# Patient Record
Sex: FEMALE | Race: BLACK OR AFRICAN AMERICAN | NOT HISPANIC OR LATINO | Employment: FULL TIME | URBAN - METROPOLITAN AREA
[De-identification: names, ages, dates, MRNs, and addresses within clinical notes are randomized per-mention and may not be internally consistent; named-entity substitution may affect disease eponyms.]

---

## 2019-11-19 ENCOUNTER — LAB (OUTPATIENT)
Dept: LAB | Facility: HOSPITAL | Age: 29
End: 2019-11-19

## 2019-11-19 ENCOUNTER — INITIAL PRENATAL (OUTPATIENT)
Dept: OBSTETRICS AND GYNECOLOGY | Facility: CLINIC | Age: 29
End: 2019-11-19

## 2019-11-19 ENCOUNTER — LAB REQUISITION (OUTPATIENT)
Dept: LAB | Facility: HOSPITAL | Age: 29
End: 2019-11-19

## 2019-11-19 VITALS
DIASTOLIC BLOOD PRESSURE: 68 MMHG | HEIGHT: 69 IN | BODY MASS INDEX: 28.29 KG/M2 | SYSTOLIC BLOOD PRESSURE: 110 MMHG | WEIGHT: 191 LBS

## 2019-11-19 DIAGNOSIS — E66.9 OBESITY (BMI 30-39.9): ICD-10-CM

## 2019-11-19 DIAGNOSIS — Z34.81 PRENATAL CARE, SUBSEQUENT PREGNANCY, FIRST TRIMESTER: ICD-10-CM

## 2019-11-19 DIAGNOSIS — Z3A.09 9 WEEKS GESTATION OF PREGNANCY: ICD-10-CM

## 2019-11-19 DIAGNOSIS — Z00.00 ROUTINE GENERAL MEDICAL EXAMINATION AT A HEALTH CARE FACILITY: ICD-10-CM

## 2019-11-19 DIAGNOSIS — O30.049 TWIN PREGNANCY, DICHORIONIC/DIAMNIOTIC, UNSPECIFIED TRIMESTER: ICD-10-CM

## 2019-11-19 DIAGNOSIS — Z34.81 PRENATAL CARE, SUBSEQUENT PREGNANCY, FIRST TRIMESTER: Primary | ICD-10-CM

## 2019-11-19 LAB
ABO GROUP BLD: NORMAL
ALBUMIN SERPL-MCNC: 4.1 G/DL (ref 3.5–5.2)
ALBUMIN/GLOB SERPL: 1.2 G/DL
ALP SERPL-CCNC: 47 U/L (ref 39–117)
ALT SERPL W P-5'-P-CCNC: 29 U/L (ref 1–33)
ANION GAP SERPL CALCULATED.3IONS-SCNC: 13.2 MMOL/L (ref 5–15)
AST SERPL-CCNC: 19 U/L (ref 1–32)
BASOPHILS # BLD AUTO: 0.02 10*3/MM3 (ref 0–0.2)
BASOPHILS NFR BLD AUTO: 0.2 % (ref 0–1.5)
BILIRUB SERPL-MCNC: 0.3 MG/DL (ref 0.2–1.2)
BLD GP AB SCN SERPL QL: NEGATIVE
BUN BLD-MCNC: 10 MG/DL (ref 6–20)
BUN/CREAT SERPL: 20.4 (ref 7–25)
CALCIUM SPEC-SCNC: 9.6 MG/DL (ref 8.6–10.5)
CHLORIDE SERPL-SCNC: 98 MMOL/L (ref 98–107)
CO2 SERPL-SCNC: 25.8 MMOL/L (ref 22–29)
CREAT BLD-MCNC: 0.49 MG/DL (ref 0.57–1)
DEPRECATED RDW RBC AUTO: 39.4 FL (ref 37–54)
EOSINOPHIL # BLD AUTO: 0.03 10*3/MM3 (ref 0–0.4)
EOSINOPHIL NFR BLD AUTO: 0.3 % (ref 0.3–6.2)
ERYTHROCYTE [DISTWIDTH] IN BLOOD BY AUTOMATED COUNT: 12.2 % (ref 12.3–15.4)
GFR SERPL CREATININE-BSD FRML MDRD: >150 ML/MIN/1.73
GLOBULIN UR ELPH-MCNC: 3.5 GM/DL
GLUCOSE BLD-MCNC: 102 MG/DL (ref 65–99)
HBA1C MFR BLD: 6.51 % (ref 4.8–5.6)
HBV SURFACE AG SERPL QL IA: NORMAL
HCT VFR BLD AUTO: 40.9 % (ref 34–46.6)
HCV AB SER DONR QL: NORMAL
HGB BLD-MCNC: 13.2 G/DL (ref 12–15.9)
HIV1+2 AB SER QL: NORMAL
IMM GRANULOCYTES # BLD AUTO: 0.04 10*3/MM3 (ref 0–0.05)
IMM GRANULOCYTES NFR BLD AUTO: 0.5 % (ref 0–0.5)
LYMPHOCYTES # BLD AUTO: 2.03 10*3/MM3 (ref 0.7–3.1)
LYMPHOCYTES NFR BLD AUTO: 23.1 % (ref 19.6–45.3)
MCH RBC QN AUTO: 28.4 PG (ref 26.6–33)
MCHC RBC AUTO-ENTMCNC: 32.3 G/DL (ref 31.5–35.7)
MCV RBC AUTO: 88.1 FL (ref 79–97)
MONOCYTES # BLD AUTO: 0.56 10*3/MM3 (ref 0.1–0.9)
MONOCYTES NFR BLD AUTO: 6.4 % (ref 5–12)
NEUTROPHILS # BLD AUTO: 6.12 10*3/MM3 (ref 1.7–7)
NEUTROPHILS NFR BLD AUTO: 69.5 % (ref 42.7–76)
NRBC BLD AUTO-RTO: 0 /100 WBC (ref 0–0.2)
PLATELET # BLD AUTO: 329 10*3/MM3 (ref 140–450)
PMV BLD AUTO: 10.3 FL (ref 6–12)
POTASSIUM BLD-SCNC: 3.3 MMOL/L (ref 3.5–5.2)
PROT SERPL-MCNC: 7.6 G/DL (ref 6–8.5)
RBC # BLD AUTO: 4.64 10*6/MM3 (ref 3.77–5.28)
RH BLD: POSITIVE
RPR SER QL: NORMAL
RUBV IGG SERPL IA-ACNC: POSITIVE
SODIUM BLD-SCNC: 137 MMOL/L (ref 136–145)
TSH SERPL DL<=0.05 MIU/L-ACNC: 0.62 UIU/ML (ref 0.27–4.2)
WBC NRBC COR # BLD: 8.8 10*3/MM3 (ref 3.4–10.8)

## 2019-11-19 PROCEDURE — 84443 ASSAY THYROID STIM HORMONE: CPT | Performed by: OBSTETRICS & GYNECOLOGY

## 2019-11-19 PROCEDURE — 99204 OFFICE O/P NEW MOD 45 MIN: CPT | Performed by: OBSTETRICS & GYNECOLOGY

## 2019-11-19 PROCEDURE — 87086 URINE CULTURE/COLONY COUNT: CPT | Performed by: OBSTETRICS & GYNECOLOGY

## 2019-11-19 PROCEDURE — 86803 HEPATITIS C AB TEST: CPT

## 2019-11-19 PROCEDURE — 81001 URINALYSIS AUTO W/SCOPE: CPT | Performed by: OBSTETRICS & GYNECOLOGY

## 2019-11-19 PROCEDURE — 36415 COLL VENOUS BLD VENIPUNCTURE: CPT | Performed by: OBSTETRICS & GYNECOLOGY

## 2019-11-19 PROCEDURE — 83036 HEMOGLOBIN GLYCOSYLATED A1C: CPT

## 2019-11-19 PROCEDURE — 80053 COMPREHEN METABOLIC PANEL: CPT | Performed by: OBSTETRICS & GYNECOLOGY

## 2019-11-19 PROCEDURE — 80081 OBSTETRIC PANEL INC HIV TSTG: CPT

## 2019-11-19 PROCEDURE — 36415 COLL VENOUS BLD VENIPUNCTURE: CPT

## 2019-11-19 RX ORDER — ONDANSETRON 4 MG/1
4 TABLET, FILM COATED ORAL DAILY PRN
Qty: 30 TABLET | Refills: 1 | Status: SHIPPED | OUTPATIENT
Start: 2019-11-19 | End: 2020-04-07 | Stop reason: SDDI

## 2019-11-19 RX ORDER — PROMETHAZINE HYDROCHLORIDE 25 MG/1
25 SUPPOSITORY RECTAL EVERY 6 HOURS PRN
Qty: 12 SUPPOSITORY | Refills: 1 | Status: SHIPPED | OUTPATIENT
Start: 2019-11-19 | End: 2020-06-10 | Stop reason: HOSPADM

## 2019-11-19 RX ORDER — AMOXICILLIN 500 MG/1
CAPSULE ORAL
COMMUNITY
Start: 2019-10-24 | End: 2020-01-07

## 2019-11-19 RX ORDER — PROMETHAZINE HYDROCHLORIDE 25 MG/1
25 TABLET ORAL EVERY 6 HOURS PRN
Qty: 30 TABLET | Refills: 0 | Status: SHIPPED | OUTPATIENT
Start: 2019-11-19 | End: 2020-06-10 | Stop reason: HOSPADM

## 2019-11-19 NOTE — PROGRESS NOTES
Subjective     Chief Complaint   Patient presents with   • Initial Prenatal Visit     JACINTA poole delivered in  with hyperemesis.  Patient states unable to eat or drink x 4 weeks.  She was seen in ED in [facility] 2 weeks ago given rx for UTI.  Patient rx given for zofran and insurance would not cover it.  She also states she can't take antibiotcs due to nause and vomiting       Comfort HAWK is a 29 y.o. year old .  Patient's last menstrual period was 09/15/2019..  She presents to be seen to initiate prenatal care with our practice.    She is currently having problems with nausea and vomiting  As it relates to the pregnancy, she has concerns regarding management of twin pregnancy. Discussed increased risk of prenatal complications, miscarriage, chromosomal abnormalities, Birth defects, growth abnormalities, PTL, and delivery complications.    The following portions of the patient's history were reviewed and updated as appropriate:vital signs, allergies, current medications, past medical history, past social history, past surgical history and problem list.    A 14 point review of systems was negative except for: Nausea    Objective     Physical Exam    General:  well developed; well nourished  no acute distress   Constitutional: healthy   Skin:  No suspicious lesions seen   Thyroid: normal to inspection and palpation   Lungs:  breathing is unlabored  clear to auscultation bilaterally   Heart:  regular rate and rhythm, S1, S2 normal, no murmur, click, rub or gallop   Breasts:  Not performed.   Abdomen: soft, non-tender; no masses  no umbilical or inginual hernias are present  no hepato-splenomegaly   Pelvis: Clinical staff was present for exam  External genitalia:  normal appearance of the external genitalia including Bartholin's and Gilman's glands.  :  urethral meatus normal; urethral hypermobility is absent.  Vaginal:  normal pink mucosa without prolapse or lesions. discharge present -  white  and curd-like;  Cervix:  normal appearance  Uterus:  symmetrically enlarged, consisent with 12 weeks size;  Adnexa:  normal bimanual exam of the adnexa.   Musculoskeletal: negative   Neuro: normal without focal findings, mental status, speech normal, alert and oriented x3 and SOFIA   Psych: oriented to time, place and person, mood and affect are within normal limits, pt is a good historian; no memory problems were noted       Lab Review   No data reviewed    Imaging   Pelvic ultrasound report      Assessment/Plan  High Risk Pregnancy    1Tonia Moyer was seen today for initial prenatal visit.    Diagnoses and all orders for this visit:    Prenatal care, subsequent pregnancy, first trimester  -     Liquid-based Pap Smear, Screening  -     Urinalysis With Culture If Indicated -  -     Gardnerella vaginalis, Trichomonas vaginalis, Candida albicans, DNA - Swab, Vagina  -     HIV-1 / O / 2 Ag / Antibody 4th Generation; Future  -     Obstetric Panel; Future  -     Pain Management Profile (13 Drugs) Urine - Urine, Clean Catch; Future  -     Hemoglobin A1c; Future  -     TSH  -     Hepatitis C Antibody    9 weeks gestation of pregnancy  -     Comprehensive Metabolic Panel  -     Non-invasive Prenatal Testing (NIPT)    Twin pregnancy, dichorionic/diamniotic, unspecified trimester  -     Comprehensive Metabolic Panel  -     Non-invasive Prenatal Testing (NIPT)    Obesity (BMI 30-39.9)    Other orders  -     promethazine (PHENERGAN) 25 MG tablet; Take 1 tablet by mouth Every 6 (Six) Hours As Needed for Nausea or Vomiting.  -     promethazine (PHENERGAN) 25 MG suppository; Insert 1 suppository into the rectum Every 6 (Six) Hours As Needed for Nausea.  -     ondansetron (ZOFRAN) 4 MG tablet; Take 1 tablet by mouth Daily As Needed for Nausea or Vomiting.        2. Information reviewed: smoking in pregnancy, exercise in pregnancy, nutrition in pregnancy, weight gain in pregnancy, work and travel restrictions during pregnancy, list  of OTC medications acceptable in pregnancy and call coverage groups   3. Genetic testing reviewed: NIPT (Trixie)   4. The problem list for pregnancy was initiated today   5. Will treat nausea more aggressively as OP may require admission  Re-check CCUA may need parental treatment     Follow up: 1 week(s)         This note was electronically signed.    Mahesh Ramon MD  November 19, 2019

## 2019-11-20 LAB
BACTERIA UR QL AUTO: ABNORMAL /HPF
BILIRUB UR QL STRIP: NEGATIVE
CLARITY UR: ABNORMAL
COLOR UR: ABNORMAL
GLUCOSE UR STRIP-MCNC: ABNORMAL MG/DL
HGB UR QL STRIP.AUTO: NEGATIVE
HYALINE CASTS UR QL AUTO: ABNORMAL /LPF
KETONES UR QL STRIP: ABNORMAL
LEUKOCYTE ESTERASE UR QL STRIP.AUTO: NEGATIVE
NITRITE UR QL STRIP: NEGATIVE
PH UR STRIP.AUTO: 6 [PH] (ref 5–8)
PROT UR QL STRIP: ABNORMAL
RBC # UR: ABNORMAL /HPF
REF LAB TEST METHOD: ABNORMAL
SP GR UR STRIP: >=1.03 (ref 1–1.03)
SQUAMOUS #/AREA URNS HPF: ABNORMAL /HPF
UROBILINOGEN UR QL STRIP: ABNORMAL
WBC UR QL AUTO: ABNORMAL /HPF

## 2019-11-21 LAB — BACTERIA SPEC AEROBE CULT: NO GROWTH

## 2019-11-25 ENCOUNTER — TELEPHONE (OUTPATIENT)
Dept: OBSTETRICS AND GYNECOLOGY | Facility: CLINIC | Age: 29
End: 2019-11-25

## 2019-11-25 RX ORDER — METRONIDAZOLE 7.5 MG/G
GEL VAGINAL 2 TIMES DAILY
Qty: 70 G | Refills: 0 | Status: SHIPPED | OUTPATIENT
Start: 2019-11-25 | End: 2019-11-30

## 2019-11-25 NOTE — TELEPHONE ENCOUNTER
----- Message from Mahesh Ramon MD sent at 11/25/2019  2:19 PM EST -----      ----- Message -----  From: Ramila Mckeon, [facility] Rep  Sent: 11/25/2019  10:22 AM  To: Mahesh Ramon MD

## 2019-11-25 NOTE — PROGRESS NOTES
Please advise patient that vaginal culture was positive for BV and that an appropriate antibiotic has been called in

## 2019-12-02 ENCOUNTER — TELEPHONE (OUTPATIENT)
Dept: OBSTETRICS AND GYNECOLOGY | Facility: CLINIC | Age: 29
End: 2019-12-02

## 2019-12-02 NOTE — TELEPHONE ENCOUNTER
----- Message from Mahesh Ramon MD sent at 12/2/2019  9:26 AM EST -----      ----- Message -----  From: Gemini Treviño  Sent: 12/2/2019   8:08 AM  To: Mahesh Ramon MD

## 2019-12-17 ENCOUNTER — ROUTINE PRENATAL (OUTPATIENT)
Dept: OBSTETRICS AND GYNECOLOGY | Facility: CLINIC | Age: 29
End: 2019-12-17

## 2019-12-17 VITALS — SYSTOLIC BLOOD PRESSURE: 110 MMHG | WEIGHT: 198 LBS | DIASTOLIC BLOOD PRESSURE: 72 MMHG | BODY MASS INDEX: 29.24 KG/M2

## 2019-12-17 DIAGNOSIS — Z3A.13 13 WEEKS GESTATION OF PREGNANCY: Primary | ICD-10-CM

## 2019-12-17 DIAGNOSIS — O30.049 TWIN PREGNANCY, DICHORIONIC/DIAMNIOTIC, UNSPECIFIED TRIMESTER: ICD-10-CM

## 2019-12-17 LAB
GLUCOSE UR STRIP-MCNC: NEGATIVE MG/DL
PROT UR STRIP-MCNC: ABNORMAL MG/DL

## 2019-12-17 PROCEDURE — 99213 OFFICE O/P EST LOW 20 MIN: CPT | Performed by: OBSTETRICS & GYNECOLOGY

## 2019-12-17 NOTE — PROGRESS NOTES
Chief Complaint   Patient presents with   • Routine Prenatal Visit     ob visit with cramping no bleeding having constipation       HPI: Comfort is a  currently at 13w2d who today reports the following:Headache - No ; Visual change - No ; Swelling in legs - No ; Nausea - No ; Constipation - No; Diarrhea - No ; Contractions - No ; Leaking fluid - No ; Vaginal bleeding -  No.      ROS: Pertinent items are noted in HPI.  Vitals: See prenatal flowsheet     EXAM:  Abdomen: See prenatal flowsheet   Urine glucose/protein: See prenatal flowsheet   Pelvic: See prenatal flowsheet     Prenatal Labs  Lab Results   Component Value Date    HGB 13.2 2019    RUBELLAIGGIN Immune 2015    RUBELLAABIGG Positive 2019    HEPBSAG Non-Reactive 2019    LABRPR Non-reactive 2015    ABORH O Rh Positive 10/18/2015    ABO O 2019    RH Positive 2019    ABSCRN Negative 2019    LABANTI Negative 2015    FINLTHY84 NonReactive 2015    VPO8KRH6 Non-Reactive 2019    HEPCVIRUSABY Non-Reactive 2019    HNZIRDC5DF 117 2015    URINECX No growth 2019       MDM:High Risk Pregnancy    Impression:Multiparous patient with medical complications, Twin pregnancy - DC/DA and Obesity  Tests done today: none  Specific topics discussed at today's visit: none - she had no major complaints,questions or concerns  Next visit:U/S for anatomic screening

## 2020-01-07 ENCOUNTER — ROUTINE PRENATAL (OUTPATIENT)
Dept: OBSTETRICS AND GYNECOLOGY | Facility: CLINIC | Age: 30
End: 2020-01-07

## 2020-01-07 ENCOUNTER — HOSPITAL ENCOUNTER (OUTPATIENT)
Dept: WOMENS IMAGING | Facility: HOSPITAL | Age: 30
Discharge: HOME | End: 2020-01-07
Admitting: OBSTETRICS & GYNECOLOGY

## 2020-01-07 ENCOUNTER — OFFICE VISIT (OUTPATIENT)
Dept: OBSTETRICS AND GYNECOLOGY | Facility: HOSPITAL | Age: 30
End: 2020-01-07

## 2020-01-07 VITALS
HEIGHT: 68 IN | BODY MASS INDEX: 31.52 KG/M2 | SYSTOLIC BLOOD PRESSURE: 103 MMHG | DIASTOLIC BLOOD PRESSURE: 64 MMHG | WEIGHT: 208 LBS

## 2020-01-07 VITALS — SYSTOLIC BLOOD PRESSURE: 102 MMHG | DIASTOLIC BLOOD PRESSURE: 60 MMHG | WEIGHT: 208 LBS | BODY MASS INDEX: 31.63 KG/M2

## 2020-01-07 DIAGNOSIS — Z3A.16 16 WEEKS GESTATION OF PREGNANCY: ICD-10-CM

## 2020-01-07 DIAGNOSIS — O30.049 TWIN PREGNANCY, DICHORIONIC/DIAMNIOTIC, UNSPECIFIED TRIMESTER: ICD-10-CM

## 2020-01-07 DIAGNOSIS — E66.9 OBESITY (BMI 30-39.9): Primary | ICD-10-CM

## 2020-01-07 LAB
GLUCOSE UR STRIP-MCNC: NEGATIVE MG/DL
PROT UR STRIP-MCNC: NEGATIVE MG/DL

## 2020-01-07 PROCEDURE — 99213 OFFICE O/P EST LOW 20 MIN: CPT | Performed by: OBSTETRICS & GYNECOLOGY

## 2020-01-07 PROCEDURE — 76815 OB US LIMITED FETUS(S): CPT

## 2020-01-07 PROCEDURE — 76815 OB US LIMITED FETUS(S): CPT | Performed by: OBSTETRICS & GYNECOLOGY

## 2020-01-07 RX ORDER — DOCUSATE SODIUM 100 MG/1
100 CAPSULE, LIQUID FILLED ORAL 2 TIMES DAILY
Qty: 60 CAPSULE | Refills: 1 | Status: ON HOLD | OUTPATIENT
Start: 2020-01-07 | End: 2020-06-10 | Stop reason: SDUPTHER

## 2020-01-07 RX ORDER — PRENATAL WITH FERROUS FUM AND FOLIC ACID 3080; 920; 120; 400; 22; 1.84; 3; 20; 10; 1; 12; 200; 27; 25; 2 [IU]/1; [IU]/1; MG/1; [IU]/1; MG/1; MG/1; MG/1; MG/1; MG/1; MG/1; UG/1; MG/1; MG/1; MG/1; MG/1
1 TABLET ORAL DAILY
COMMUNITY

## 2020-01-07 NOTE — PROGRESS NOTES
"Pt c/o rt upper epigastric pain with vomiting.  States \"my gallbladder is causing me problems.\"  Denies problems with pregnancy.  To see OB next.  Panorama -low risk, female, dizygotic twins.  "

## 2020-01-07 NOTE — PROGRESS NOTES
Documentation of the ultasound findings, images, and interpretations with be available in the patient's Viewpoint report located in the Chart Review Imaging tab in [employer].

## 2020-01-07 NOTE — PROGRESS NOTES
Chief Complaint   Patient presents with   • Routine Prenatal Visit     ob visit with PDC appt also hemorrhoids and they are bleeding       HPI: Comfort is a  currently at 16w2d who today reports the following:Headache - No ; Visual change - No ; Swelling in legs - No ; Nausea - No ; Constipation - No; Diarrhea - No ; Contractions - No ; Leaking fluid - No ; Vaginal bleeding -  No.      ROS: constipation and bleeding hemorrhoids  Vitals: See prenatal flowsheet     EXAM:  Abdomen: See prenatal flowsheet   Urine glucose/protein: See prenatal flowsheet   Pelvic: See prenatal flowsheet     Prenatal Labs  Lab Results   Component Value Date    HGB 13.2 2019    RUBELLAIGGIN Immune 2015    RUBELLAABIGG Positive 2019    HEPBSAG Non-Reactive 2019    LABRPR Non-reactive 2015    ABORH O Rh Positive 10/18/2015    ABO O 2019    RH Positive 2019    ABSCRN Negative 2019    LABANTI Negative 2015    MWGSVOQ49 NonReactive 2015    BSD0DPS4 Non-Reactive 2019    HEPCVIRUSABY Non-Reactive 2019    LUSHIUE8NL 117 2015    URINECX No growth 2019       MDM:High Risk Pregnancy    Impression:Multiparous patient with medical complications, Twin pregnancy - DC/DA, Obesity and external hemorrhoids  Tests done today: U/S for anatomic screening   Specific topics discussed at today's visit: hemorrhoid mgt  Next visit:U/S to complete the anatomic screening  and no MSAFP

## 2020-02-04 ENCOUNTER — ROUTINE PRENATAL (OUTPATIENT)
Dept: OBSTETRICS AND GYNECOLOGY | Facility: CLINIC | Age: 30
End: 2020-02-04

## 2020-02-04 ENCOUNTER — HOSPITAL ENCOUNTER (OUTPATIENT)
Dept: WOMENS IMAGING | Facility: HOSPITAL | Age: 30
Discharge: HOME | End: 2020-02-04
Admitting: OBSTETRICS & GYNECOLOGY

## 2020-02-04 ENCOUNTER — OFFICE VISIT (OUTPATIENT)
Dept: OBSTETRICS AND GYNECOLOGY | Facility: HOSPITAL | Age: 30
End: 2020-02-04

## 2020-02-04 VITALS — WEIGHT: 224 LBS | DIASTOLIC BLOOD PRESSURE: 67 MMHG | BODY MASS INDEX: 34.06 KG/M2 | SYSTOLIC BLOOD PRESSURE: 109 MMHG

## 2020-02-04 VITALS — SYSTOLIC BLOOD PRESSURE: 100 MMHG | BODY MASS INDEX: 34.06 KG/M2 | WEIGHT: 224 LBS | DIASTOLIC BLOOD PRESSURE: 60 MMHG

## 2020-02-04 DIAGNOSIS — E66.9 OBESITY (BMI 30-39.9): ICD-10-CM

## 2020-02-04 DIAGNOSIS — E66.9 OBESITY (BMI 30-39.9): Primary | ICD-10-CM

## 2020-02-04 DIAGNOSIS — O30.049 TWIN PREGNANCY, DICHORIONIC/DIAMNIOTIC, UNSPECIFIED TRIMESTER: ICD-10-CM

## 2020-02-04 DIAGNOSIS — Z3A.20 20 WEEKS GESTATION OF PREGNANCY: ICD-10-CM

## 2020-02-04 DIAGNOSIS — O30.049 TWIN PREGNANCY, DICHORIONIC/DIAMNIOTIC, UNSPECIFIED TRIMESTER: Primary | ICD-10-CM

## 2020-02-04 DIAGNOSIS — Z3A.16 16 WEEKS GESTATION OF PREGNANCY: ICD-10-CM

## 2020-02-04 PROCEDURE — 76812 OB US DETAILED ADDL FETUS: CPT

## 2020-02-04 PROCEDURE — 76811 OB US DETAILED SNGL FETUS: CPT | Performed by: OBSTETRICS & GYNECOLOGY

## 2020-02-04 PROCEDURE — 99213 OFFICE O/P EST LOW 20 MIN: CPT | Performed by: OBSTETRICS & GYNECOLOGY

## 2020-02-04 PROCEDURE — 76812 OB US DETAILED ADDL FETUS: CPT | Performed by: OBSTETRICS & GYNECOLOGY

## 2020-02-04 PROCEDURE — 76811 OB US DETAILED SNGL FETUS: CPT

## 2020-02-04 NOTE — PROGRESS NOTES
Chief Complaint   Patient presents with   • Routine Prenatal Visit     ob visit with PDC appt        HPI: Comfort is a  currently at 20w2d who today reports the following:Headache - No ; Visual change - No ; Swelling in legs - No ; Nausea - No ; Constipation - No; Diarrhea - No ; Contractions - No ; Leaking fluid - No ; Vaginal bleeding -  No.      ROS: Pertinent items are noted in HPI.  Vitals: See prenatal flowsheet     EXAM:  Abdomen: See prenatal flowsheet   Urine glucose/protein: See prenatal flowsheet   Pelvic: See prenatal flowsheet     Prenatal Labs  Lab Results   Component Value Date    HGB 13.2 2019    RUBELLAIGGIN Immune 2015    RUBELLAABIGG Positive 2019    HEPBSAG Non-Reactive 2019    LABRPR Non-reactive 2015    ABORH O Rh Positive 10/18/2015    ABO O 2019    RH Positive 2019    ABSCRN Negative 2019    LABANTI Negative 2015    DOVVBED49 NonReactive 2015    YET4OBR3 Non-Reactive 2019    HEPCVIRUSABY Non-Reactive 2019    JUKAGSR0DM 117 2015    URINECX No growth 2019       MDM: High Risk Pregnancy  Impression:Multiparous patient with medical complications, Twin pregnancy - DC/DA and Obesity  Tests done today: U/S for anatomic screening   Specific topics discussed at today's visit:  labor signs and symptoms  Next visit:GCT and U/S for EFW

## 2020-02-04 NOTE — PROGRESS NOTES
Pt denies all s/s PTL,  Denies other problems.  To see OB next.  Panorama -low risk, dizygotic, female.

## 2020-02-04 NOTE — PROGRESS NOTES
Documentation of the ultasound findings, images, and interpretations with be available in the patient's Viewpoint report located in the Chart Review Imaging tab in [employer].

## 2020-03-03 ENCOUNTER — OFFICE VISIT (OUTPATIENT)
Dept: OBSTETRICS AND GYNECOLOGY | Facility: HOSPITAL | Age: 30
End: 2020-03-03

## 2020-03-03 ENCOUNTER — LAB (OUTPATIENT)
Dept: LAB | Facility: HOSPITAL | Age: 30
End: 2020-03-03

## 2020-03-03 ENCOUNTER — HOSPITAL ENCOUNTER (OUTPATIENT)
Dept: WOMENS IMAGING | Facility: HOSPITAL | Age: 30
Discharge: HOME | End: 2020-03-03
Admitting: OBSTETRICS & GYNECOLOGY

## 2020-03-03 VITALS — WEIGHT: 239 LBS | SYSTOLIC BLOOD PRESSURE: 122 MMHG | BODY MASS INDEX: 36.34 KG/M2 | DIASTOLIC BLOOD PRESSURE: 71 MMHG

## 2020-03-03 DIAGNOSIS — O30.049 TWIN PREGNANCY, DICHORIONIC/DIAMNIOTIC, UNSPECIFIED TRIMESTER: ICD-10-CM

## 2020-03-03 DIAGNOSIS — E66.9 OBESITY (BMI 30-39.9): ICD-10-CM

## 2020-03-03 DIAGNOSIS — Z34.90 PREGNANCY, UNSPECIFIED GESTATIONAL AGE: ICD-10-CM

## 2020-03-03 DIAGNOSIS — Z3A.20 20 WEEKS GESTATION OF PREGNANCY: ICD-10-CM

## 2020-03-03 DIAGNOSIS — O30.049 TWIN PREGNANCY, DICHORIONIC/DIAMNIOTIC, UNSPECIFIED TRIMESTER: Primary | ICD-10-CM

## 2020-03-03 LAB — GLUCOSE 1H P 100 G GLC PO SERPL-MCNC: 97 MG/DL (ref 65–140)

## 2020-03-03 PROCEDURE — 76816 OB US FOLLOW-UP PER FETUS: CPT | Performed by: OBSTETRICS & GYNECOLOGY

## 2020-03-03 PROCEDURE — 36415 COLL VENOUS BLD VENIPUNCTURE: CPT

## 2020-03-03 PROCEDURE — 82950 GLUCOSE TEST: CPT

## 2020-03-03 PROCEDURE — 76816 OB US FOLLOW-UP PER FETUS: CPT

## 2020-03-03 NOTE — PROGRESS NOTES
Pt denies all s/s PTL, denies other problems.  To see OB next.  Panorama-low risk, female, dizygotic.

## 2020-03-31 ENCOUNTER — APPOINTMENT (OUTPATIENT)
Dept: WOMENS IMAGING | Facility: HOSPITAL | Age: 30
End: 2020-03-31

## 2020-04-07 ENCOUNTER — HOSPITAL ENCOUNTER (OUTPATIENT)
Dept: WOMENS IMAGING | Facility: HOSPITAL | Age: 30
Discharge: HOME | End: 2020-04-07
Admitting: OBSTETRICS & GYNECOLOGY

## 2020-04-07 ENCOUNTER — APPOINTMENT (OUTPATIENT)
Dept: LAB | Facility: HOSPITAL | Age: 30
End: 2020-04-07

## 2020-04-07 ENCOUNTER — OFFICE VISIT (OUTPATIENT)
Dept: OBSTETRICS AND GYNECOLOGY | Facility: HOSPITAL | Age: 30
End: 2020-04-07

## 2020-04-07 ENCOUNTER — ROUTINE PRENATAL (OUTPATIENT)
Dept: OBSTETRICS AND GYNECOLOGY | Facility: CLINIC | Age: 30
End: 2020-04-07

## 2020-04-07 VITALS — WEIGHT: 247.6 LBS | SYSTOLIC BLOOD PRESSURE: 113 MMHG | BODY MASS INDEX: 37.65 KG/M2 | DIASTOLIC BLOOD PRESSURE: 63 MMHG

## 2020-04-07 VITALS — WEIGHT: 247 LBS | BODY MASS INDEX: 37.56 KG/M2 | DIASTOLIC BLOOD PRESSURE: 63 MMHG | SYSTOLIC BLOOD PRESSURE: 113 MMHG

## 2020-04-07 DIAGNOSIS — Z34.90 PREGNANCY, UNSPECIFIED GESTATIONAL AGE: ICD-10-CM

## 2020-04-07 DIAGNOSIS — E66.9 OBESITY (BMI 30-39.9): Primary | ICD-10-CM

## 2020-04-07 DIAGNOSIS — O30.049 TWIN PREGNANCY, DICHORIONIC/DIAMNIOTIC, UNSPECIFIED TRIMESTER: ICD-10-CM

## 2020-04-07 DIAGNOSIS — Z3A.29 29 WEEKS GESTATION OF PREGNANCY: ICD-10-CM

## 2020-04-07 LAB
DEPRECATED RDW RBC AUTO: 41 FL (ref 37–54)
ERYTHROCYTE [DISTWIDTH] IN BLOOD BY AUTOMATED COUNT: 12.2 % (ref 12.3–15.4)
HCT VFR BLD AUTO: 31.5 % (ref 34–46.6)
HGB BLD-MCNC: 10.1 G/DL (ref 12–15.9)
MCH RBC QN AUTO: 29.3 PG (ref 26.6–33)
MCHC RBC AUTO-ENTMCNC: 32.1 G/DL (ref 31.5–35.7)
MCV RBC AUTO: 91.3 FL (ref 79–97)
PLATELET # BLD AUTO: 328 10*3/MM3 (ref 140–450)
PMV BLD AUTO: 10.7 FL (ref 6–12)
RBC # BLD AUTO: 3.45 10*6/MM3 (ref 3.77–5.28)
WBC NRBC COR # BLD: 10.01 10*3/MM3 (ref 3.4–10.8)

## 2020-04-07 PROCEDURE — 36415 COLL VENOUS BLD VENIPUNCTURE: CPT | Performed by: OBSTETRICS & GYNECOLOGY

## 2020-04-07 PROCEDURE — 85027 COMPLETE CBC AUTOMATED: CPT | Performed by: OBSTETRICS & GYNECOLOGY

## 2020-04-07 PROCEDURE — 76816 OB US FOLLOW-UP PER FETUS: CPT | Performed by: OBSTETRICS & GYNECOLOGY

## 2020-04-07 PROCEDURE — 99213 OFFICE O/P EST LOW 20 MIN: CPT | Performed by: OBSTETRICS & GYNECOLOGY

## 2020-04-07 PROCEDURE — 76816 OB US FOLLOW-UP PER FETUS: CPT

## 2020-04-07 NOTE — PROGRESS NOTES
Chief Complaint   Patient presents with   • Routine Prenatal Visit     ob visit with pdc appt patient states body overheated got dizzy and passed out happened a couple of times       HPI: Comfort is a  currently at 29w2d who today reports the following:Headache - No ; Visual change - No ; Swelling in legs - No ; Nausea - No ; Constipation - No; Diarrhea - No ; Contractions - No ; Leaking fluid - No ; Vaginal bleeding -  No.      ROS: Constitutional: positive for lightheadedness, negative for fevers  Respiratory: negative for cough and dyspnea on exertion  Cardiovascular: negative  Vitals: See prenatal flowsheet     EXAM:  Abdomen: See prenatal flowsheet   Urine glucose/protein: See prenatal flowsheet   Pelvic: See prenatal flowsheet     Prenatal Labs  Lab Results   Component Value Date    HGB 13.2 2019    RUBELLAIGGIN Immune 2015    RUBELLAABIGG Positive 2019    HEPBSAG Non-Reactive 2019    LABRPR Non-reactive 2015    ABORH O Rh Positive 10/18/2015    ABO O 2019    RH Positive 2019    ABSCRN Negative 2019    LABANTI Negative 2015    RSCOMIY48 NonReactive 2015    ETZ5DKY0 Non-Reactive 2019    HEPCVIRUSABY Non-Reactive 2019    WZGWVCW9BF 117 2015    URINECX No growth 2019       MDM:High Risk Pregnancy    Impression:Multiparous patient with medical complications, Twin pregnancy - DC/DA and lightheadedness  Tests done today: HgB and U/S for EFW   Specific topics discussed at today's visit: Questions answered regarding COVID-19 and revision of office schedule of visits due to pandemic  delivery plan. evaluation of lightheadedness. ARIE. fetal movement monitoring  Next visit:none

## 2020-04-07 NOTE — PROGRESS NOTES
Documentation of the ultasound findings, images, and interpretations with be available in the patient's Viewpoint report located in the Chart Review Imaging tab in [employer].

## 2020-04-08 ENCOUNTER — TELEPHONE (OUTPATIENT)
Dept: OBSTETRICS AND GYNECOLOGY | Facility: CLINIC | Age: 30
End: 2020-04-08

## 2020-04-08 RX ORDER — FERROUS SULFATE 325(65) MG
325 TABLET ORAL
Qty: 60 TABLET | Refills: 10 | Status: ON HOLD | OUTPATIENT
Start: 2020-04-08 | End: 2020-06-10 | Stop reason: SDUPTHER

## 2020-04-08 NOTE — TELEPHONE ENCOUNTER
----- Message from Mahesh Ramon MD sent at 4/8/2020  9:56 AM EDT -----  CBC shows mild anemia, Iron supplement has been erx

## 2020-04-21 ENCOUNTER — ROUTINE PRENATAL (OUTPATIENT)
Dept: OBSTETRICS AND GYNECOLOGY | Facility: CLINIC | Age: 30
End: 2020-04-21

## 2020-04-21 VITALS — WEIGHT: 248 LBS | SYSTOLIC BLOOD PRESSURE: 120 MMHG | BODY MASS INDEX: 37.71 KG/M2 | DIASTOLIC BLOOD PRESSURE: 66 MMHG

## 2020-04-21 DIAGNOSIS — O30.049 TWIN PREGNANCY, DICHORIONIC/DIAMNIOTIC, UNSPECIFIED TRIMESTER: ICD-10-CM

## 2020-04-21 DIAGNOSIS — E66.9 OBESITY (BMI 30-39.9): Primary | ICD-10-CM

## 2020-04-21 DIAGNOSIS — Z3A.31 31 WEEKS GESTATION OF PREGNANCY: ICD-10-CM

## 2020-04-21 LAB
GLUCOSE UR STRIP-MCNC: NEGATIVE MG/DL
PROT UR STRIP-MCNC: NEGATIVE MG/DL

## 2020-04-21 PROCEDURE — 99212 OFFICE O/P EST SF 10 MIN: CPT | Performed by: OBSTETRICS & GYNECOLOGY

## 2020-04-21 NOTE — PROGRESS NOTES
Chief Complaint   Patient presents with   • Routine Prenatal Visit     ob visit no c/o       HPI: Comfort is a  currently at 31w2d who today reports the following:Headache - No ; Visual change - No ; Swelling in legs - No ; Nausea - No ; Constipation - No; Diarrhea - No ; Contractions - No ; Leaking fluid - No ; Vaginal bleeding -  No.      ROS: Constitutional: negative for fever, chills, activity change, appetite change, fatigue and unexpected weight change.  Respiratory: negative for cough and dyspnea on exertion  Vitals: See prenatal flowsheet     EXAM:  Abdomen: See prenatal flowsheet   Urine glucose/protein: See prenatal flowsheet   Pelvic: See prenatal flowsheet     Prenatal Labs  Lab Results   Component Value Date    HGB 10.1 (L) 2020    RUBELLAIGGIN Immune 2015    RUBELLAABIGG Positive 2019    HEPBSAG Non-Reactive 2019    LABRPR Non-reactive 2015    ABORH O Rh Positive 10/18/2015    ABO O 2019    RH Positive 2019    ABSCRN Negative 2019    LABANTI Negative 2015    BGFBVID36 NonReactive 2015    TGA7ZAB4 Non-Reactive 2019    HEPCVIRUSABY Non-Reactive 2019    VUTUNLM9FY 117 2015    URINECX No growth 2019       MDM:High Risk Pregnancy    Impression:Multiparous patient with medical complications, Twin pregnancy - DC/DA, Obesity and malpresentation  Tests done today: none  Specific topics discussed at today's visit: Questions answered regarding COVID-19 and revision of office schedule of visits due to pandemic   labor signs and symptoms  Next visit:U/S for EFW

## 2020-04-29 ENCOUNTER — TELEPHONE (OUTPATIENT)
Dept: OBSTETRICS AND GYNECOLOGY | Facility: CLINIC | Age: 30
End: 2020-04-29

## 2020-04-29 ENCOUNTER — ROUTINE PRENATAL (OUTPATIENT)
Dept: OBSTETRICS AND GYNECOLOGY | Facility: CLINIC | Age: 30
End: 2020-04-29

## 2020-04-29 VITALS — BODY MASS INDEX: 37.71 KG/M2 | SYSTOLIC BLOOD PRESSURE: 120 MMHG | WEIGHT: 248 LBS | DIASTOLIC BLOOD PRESSURE: 60 MMHG

## 2020-04-29 DIAGNOSIS — Z3A.32 32 WEEKS GESTATION OF PREGNANCY: Primary | ICD-10-CM

## 2020-04-29 DIAGNOSIS — O30.049 TWIN PREGNANCY, DICHORIONIC/DIAMNIOTIC, UNSPECIFIED TRIMESTER: ICD-10-CM

## 2020-04-29 PROCEDURE — 99213 OFFICE O/P EST LOW 20 MIN: CPT | Performed by: OBSTETRICS & GYNECOLOGY

## 2020-04-29 NOTE — TELEPHONE ENCOUNTER
Tristen: Pt called stated her dizzy spells  And passing out are coming back and more often and she doesn't feel safe and doesn't want to wait until 5-5-20. Spoke to Tammie and she suggested for patient to come on in this morning. I jenny pt for 9:10am KR

## 2020-04-29 NOTE — PROGRESS NOTES
Chief Complaint   Patient presents with   • Routine Prenatal Visit     ob visit workin patient states passing out spells yesterday noticed getting worse since started taking iron tablets.  She states she gets clammy then passes out       HPI: Comfort is a  currently at 32w3d who today reports the following:Headache - No ; Visual change - No ; Swelling in legs - No ; Nausea - No ; Constipation - No; Diarrhea - No ; Contractions - No ; Leaking fluid - No ; Vaginal bleeding -  No.      ROS: Constitutional: negative for fever, chills, activity change, appetite change, fatigue and unexpected weight change.  Respiratory: negative for cough and dyspnea on exertion  + occasional lightheadedness attributed to iron  Vitals: See prenatal flowsheet     EXAM:  Abdomen: See prenatal flowsheet   Urine glucose/protein: See prenatal flowsheet   Pelvic: See prenatal flowsheet     Prenatal Labs  Lab Results   Component Value Date    HGB 10.1 (L) 2020    RUBELLAIGGIN Immune 2015    RUBELLAABIGG Positive 2019    HEPBSAG Non-Reactive 2019    LABRPR Non-reactive 2015    ABORH O Rh Positive 10/18/2015    ABO O 2019    RH Positive 2019    ABSCRN Negative 2019    LABANTI Negative 2015    VMNEJLZ59 NonReactive 2015    DJK9ELD0 Non-Reactive 2019    HEPCVIRUSABY Non-Reactive 2019    GJRVTPE5OT 117 2015    URINECX No growth 2019       MDM:High Risk Pregnancy    Impression:Multiparous patient with medical complications, Twin pregnancy - DC/DA and anemia, mild. Intolerant to iron  Tests done today: US for FHT and Fetal position only. Br/Tvs  Specific topics discussed at today's visit: Questions answered regarding COVID-19 and revision of office schedule of visits due to pandemic   labor signs and symptoms  iron supplementation. lightheadedness  Next visit:U/S for EFW

## 2020-05-05 ENCOUNTER — HOSPITAL ENCOUNTER (OUTPATIENT)
Dept: WOMENS IMAGING | Facility: HOSPITAL | Age: 30
Discharge: HOME | End: 2020-05-05
Admitting: OBSTETRICS & GYNECOLOGY

## 2020-05-05 ENCOUNTER — APPOINTMENT (OUTPATIENT)
Dept: LAB | Facility: HOSPITAL | Age: 30
End: 2020-05-05

## 2020-05-05 ENCOUNTER — PREP FOR SURGERY (OUTPATIENT)
Dept: OTHER | Facility: HOSPITAL | Age: 30
End: 2020-05-05

## 2020-05-05 ENCOUNTER — ROUTINE PRENATAL (OUTPATIENT)
Dept: OBSTETRICS AND GYNECOLOGY | Facility: CLINIC | Age: 30
End: 2020-05-05

## 2020-05-05 ENCOUNTER — OFFICE VISIT (OUTPATIENT)
Dept: OBSTETRICS AND GYNECOLOGY | Facility: HOSPITAL | Age: 30
End: 2020-05-05

## 2020-05-05 VITALS — DIASTOLIC BLOOD PRESSURE: 70 MMHG | WEIGHT: 257 LBS | BODY MASS INDEX: 39.08 KG/M2 | SYSTOLIC BLOOD PRESSURE: 115 MMHG

## 2020-05-05 VITALS — DIASTOLIC BLOOD PRESSURE: 76 MMHG | WEIGHT: 257 LBS | SYSTOLIC BLOOD PRESSURE: 118 MMHG | BODY MASS INDEX: 39.08 KG/M2

## 2020-05-05 DIAGNOSIS — O30.049 TWIN PREGNANCY, DICHORIONIC/DIAMNIOTIC, UNSPECIFIED TRIMESTER: ICD-10-CM

## 2020-05-05 DIAGNOSIS — O30.049 TWIN PREGNANCY, DICHORIONIC/DIAMNIOTIC, UNSPECIFIED TRIMESTER: Primary | ICD-10-CM

## 2020-05-05 DIAGNOSIS — Z3A.33 33 WEEKS GESTATION OF PREGNANCY: ICD-10-CM

## 2020-05-05 DIAGNOSIS — E66.9 OBESITY (BMI 30-39.9): Primary | ICD-10-CM

## 2020-05-05 DIAGNOSIS — Z3A.29 29 WEEKS GESTATION OF PREGNANCY: ICD-10-CM

## 2020-05-05 DIAGNOSIS — E66.9 OBESITY (BMI 30-39.9): ICD-10-CM

## 2020-05-05 DIAGNOSIS — Z34.90 PREGNANCY, UNSPECIFIED GESTATIONAL AGE: ICD-10-CM

## 2020-05-05 LAB
DEPRECATED RDW RBC AUTO: 40.5 FL (ref 37–54)
ERYTHROCYTE [DISTWIDTH] IN BLOOD BY AUTOMATED COUNT: 12.5 % (ref 12.3–15.4)
HCT VFR BLD AUTO: 31.9 % (ref 34–46.6)
HGB BLD-MCNC: 10.6 G/DL (ref 12–15.9)
MCH RBC QN AUTO: 29.4 PG (ref 26.6–33)
MCHC RBC AUTO-ENTMCNC: 33.2 G/DL (ref 31.5–35.7)
MCV RBC AUTO: 88.6 FL (ref 79–97)
PLATELET # BLD AUTO: 320 10*3/MM3 (ref 140–450)
PMV BLD AUTO: 11.2 FL (ref 6–12)
RBC # BLD AUTO: 3.6 10*6/MM3 (ref 3.77–5.28)
WBC NRBC COR # BLD: 10.79 10*3/MM3 (ref 3.4–10.8)

## 2020-05-05 PROCEDURE — 85027 COMPLETE CBC AUTOMATED: CPT | Performed by: OBSTETRICS & GYNECOLOGY

## 2020-05-05 PROCEDURE — 36415 COLL VENOUS BLD VENIPUNCTURE: CPT | Performed by: OBSTETRICS & GYNECOLOGY

## 2020-05-05 PROCEDURE — 99213 OFFICE O/P EST LOW 20 MIN: CPT | Performed by: OBSTETRICS & GYNECOLOGY

## 2020-05-05 PROCEDURE — 76816 OB US FOLLOW-UP PER FETUS: CPT

## 2020-05-05 PROCEDURE — 76816 OB US FOLLOW-UP PER FETUS: CPT | Performed by: OBSTETRICS & GYNECOLOGY

## 2020-05-05 RX ORDER — TRISODIUM CITRATE DIHYDRATE AND CITRIC ACID MONOHYDRATE 500; 334 MG/5ML; MG/5ML
30 SOLUTION ORAL ONCE
Status: CANCELLED | OUTPATIENT
Start: 2020-05-05 | End: 2020-05-05

## 2020-05-05 RX ORDER — LIDOCAINE HYDROCHLORIDE 10 MG/ML
5 INJECTION, SOLUTION EPIDURAL; INFILTRATION; INTRACAUDAL; PERINEURAL AS NEEDED
Status: CANCELLED | OUTPATIENT
Start: 2020-05-05

## 2020-05-05 RX ORDER — CARBOPROST TROMETHAMINE 250 UG/ML
250 INJECTION, SOLUTION INTRAMUSCULAR AS NEEDED
Status: CANCELLED | OUTPATIENT
Start: 2020-05-05

## 2020-05-05 RX ORDER — SODIUM CHLORIDE 0.9 % (FLUSH) 0.9 %
10 SYRINGE (ML) INJECTION AS NEEDED
Status: CANCELLED | OUTPATIENT
Start: 2020-05-05

## 2020-05-05 RX ORDER — MISOPROSTOL 200 UG/1
800 TABLET ORAL AS NEEDED
Status: CANCELLED | OUTPATIENT
Start: 2020-05-05

## 2020-05-05 RX ORDER — METHYLERGONOVINE MALEATE 0.2 MG/ML
200 INJECTION INTRAVENOUS ONCE AS NEEDED
Status: CANCELLED | OUTPATIENT
Start: 2020-05-05

## 2020-05-05 RX ORDER — CEFAZOLIN SODIUM 2 G/100ML
2 INJECTION, SOLUTION INTRAVENOUS ONCE
Status: CANCELLED | OUTPATIENT
Start: 2020-05-05 | End: 2020-05-05

## 2020-05-05 RX ORDER — OXYTOCIN-SODIUM CHLORIDE 0.9% IV SOLN 30 UNIT/500ML 30-0.9/5 UT/ML-%
650 SOLUTION INTRAVENOUS ONCE
Status: CANCELLED | OUTPATIENT
Start: 2020-05-05 | End: 2020-05-05

## 2020-05-05 RX ORDER — SODIUM CHLORIDE, SODIUM LACTATE, POTASSIUM CHLORIDE, CALCIUM CHLORIDE 600; 310; 30; 20 MG/100ML; MG/100ML; MG/100ML; MG/100ML
125 INJECTION, SOLUTION INTRAVENOUS CONTINUOUS
Status: CANCELLED | OUTPATIENT
Start: 2020-05-05

## 2020-05-05 RX ORDER — SODIUM CHLORIDE 0.9 % (FLUSH) 0.9 %
3 SYRINGE (ML) INJECTION EVERY 12 HOURS SCHEDULED
Status: CANCELLED | OUTPATIENT
Start: 2020-05-05

## 2020-05-05 RX ORDER — OXYTOCIN-SODIUM CHLORIDE 0.9% IV SOLN 30 UNIT/500ML 30-0.9/5 UT/ML-%
85 SOLUTION INTRAVENOUS ONCE
Status: CANCELLED | OUTPATIENT
Start: 2020-05-05 | End: 2020-05-05

## 2020-05-05 NOTE — PROGRESS NOTES
"Pt denies vaginal bleeding or fluid leakage.  Reports \"have never had contractions but last night had some painful ones and this morning and this morning too.\"  To see OB next.  Panorama-neg, female x 2.  "

## 2020-05-05 NOTE — PROGRESS NOTES
Chief Complaint   Patient presents with   • Routine Prenatal Visit     ob visit with pdc appt today patient also having dizzy spells       HPI: Comfort is a  currently at 33w2d who today reports the following:Headache - No ; Visual change - No ; Swelling in legs - No ; Nausea - No ; Constipation - No; Diarrhea - No ; Contractions - No ; Leaking fluid - No ; Vaginal bleeding -  No.      ROS: Constitutional: negative for fever, chills, activity change, appetite change, fatigue and unexpected weight change.  Respiratory: negative for cough and dyspnea on exertion  ocasional brief episodes of lightheadedness  Vitals: See prenatal flowsheet     EXAM:  Abdomen: See prenatal flowsheet   Urine glucose/protein: See prenatal flowsheet   Pelvic: See prenatal flowsheet     Prenatal Labs  Lab Results   Component Value Date    HGB 10.1 (L) 2020    RUBELLAIGGIN Immune 2015    RUBELLAABIGG Positive 2019    HEPBSAG Non-Reactive 2019    LABRPR Non-reactive 2015    ABORH O Rh Positive 10/18/2015    ABO O 2019    RH Positive 2019    ABSCRN Negative 2019    LABANTI Negative 2015    POMENOQ85 NonReactive 2015    JMW1SNH8 Non-Reactive 2019    HEPCVIRUSABY Non-Reactive 2019    PPFCDMZ9VT 117 2015    URINECX No growth 2019       MDM:High Risk Pregnancy    Impression:Multiparous patient with medical complications, Twin pregnancy - DC/DA and Malpresentation. Anemia, mild, lightheadedness occasional  Tests done today: HgB and U/S for EFW   Specific topics discussed at today's visit: Questions answered regarding COVID-19 and revision of office schedule of visits due to pandemic  Birth plan   labor signs and symptoms  Next visit:none

## 2020-05-06 ENCOUNTER — TELEPHONE (OUTPATIENT)
Dept: OBSTETRICS AND GYNECOLOGY | Facility: CLINIC | Age: 30
End: 2020-05-06

## 2020-05-06 NOTE — TELEPHONE ENCOUNTER
----- Message from Mahesh Ramon MD sent at 5/6/2020 12:32 PM EDT -----  Advise hemoglobin level is stable

## 2020-05-09 ENCOUNTER — HOSPITAL ENCOUNTER (OUTPATIENT)
Facility: HOSPITAL | Age: 30
Setting detail: OBSERVATION
Discharge: HOME | End: 2020-05-09
Attending: OBSTETRICS & GYNECOLOGY | Admitting: OBSTETRICS & GYNECOLOGY

## 2020-05-09 VITALS
HEIGHT: 68 IN | DIASTOLIC BLOOD PRESSURE: 60 MMHG | RESPIRATION RATE: 16 BRPM | TEMPERATURE: 97.8 F | WEIGHT: 257 LBS | BODY MASS INDEX: 38.95 KG/M2 | HEART RATE: 110 BPM | SYSTOLIC BLOOD PRESSURE: 117 MMHG

## 2020-05-09 LAB
BILIRUB UR QL STRIP: NEGATIVE
CLARITY UR: CLEAR
COLOR UR: YELLOW
GLUCOSE UR STRIP-MCNC: NEGATIVE MG/DL
HGB UR QL STRIP.AUTO: NEGATIVE
KETONES UR QL STRIP: NEGATIVE
LEUKOCYTE ESTERASE UR QL STRIP.AUTO: NEGATIVE
NITRITE UR QL STRIP: NEGATIVE
PH UR STRIP.AUTO: 6.5 [PH] (ref 5–8)
PROT UR QL STRIP: NEGATIVE
SP GR UR STRIP: 1.01 (ref 1–1.03)
UROBILINOGEN UR QL STRIP: NORMAL

## 2020-05-09 PROCEDURE — 59025 FETAL NON-STRESS TEST: CPT

## 2020-05-09 PROCEDURE — 59025 FETAL NON-STRESS TEST: CPT | Performed by: OBSTETRICS & GYNECOLOGY

## 2020-05-09 PROCEDURE — 81003 URINALYSIS AUTO W/O SCOPE: CPT | Performed by: OBSTETRICS & GYNECOLOGY

## 2020-05-09 PROCEDURE — G0378 HOSPITAL OBSERVATION PER HR: HCPCS

## 2020-05-09 PROCEDURE — 99218 PR INITIAL OBSERVATION CARE/DAY 30 MINUTES: CPT | Performed by: OBSTETRICS & GYNECOLOGY

## 2020-05-09 NOTE — H&P
[facility]  Obstetric History and Physical    Referring Provider: Mahesh Ramon MD      Chief Complaint   Patient presents with   • Back Pain     and pelvic pressure       Subjective     Patient is a 29 y.o. female  currently at 33w6d, diamniotic dichorionic twin gestation who presents with c/o back pain and pelvic pressure she reports constant low back pain today with mild pelvic pressure without any associated leaking of fluid or vaginal bleeding.  She reports normal fetal movement.  Patient denies any other associated symptoms or concerns.  Prenatal care by Dr. Ramon.   course complicated by dichorionic diamniotic twin gestation.  Previous term vaginal delivery without complications..    Her prenatal care is complicated by  multiple gestation  DC/DA twins.  Her previous obstetric/gynecological history is remarkable for .    The following portions of the patients history were reviewed and updated as appropriate: current medications, allergies, past medical history, past surgical history, past family history, past social history and problem list .       Prenatal Information:   Maternal Prenatal Labs  Blood Type No results found for: ABO   Rh Status No results found for: RH   Antibody Screen No results found for: ABSCRN   Gonnorhea No results found for: GCCX   Chlamydia No results found for: CLAMYDCU   RPR No results found for: RPR   Syphilis Antibody No results found for: SYPHILIS   Rubella No results found for: RUBELLAIGGIN   Hepatitis B Surface Antigen No results found for: HEPBSAG   HIV-1 Antibody No results found for: LABHIV1   Hepatitis C Antibody No results found for: HEPCAB   Rapid Urin Drug Screen No results found for: AMPMETHU, BARBITSCNUR, LABBENZSCN, LABMETHSCN, LABOPIASCN, THCURSCR, COCAINEUR, AMPHETSCREEN, PROPOXSCN, BUPRENORSCNU, METAMPSCNUR, OXYCODONESCN, TRICYCLICSCN   Group B Strep Culture No results found for: GBSANTIGEN           External Prenatal Results     Pregnancy  Outside Results - Transcribed From Office Records - See Scanned Records For Details     Test Value Date Time    Hgb 10.6 g/dL 20 1410      10.1 g/dL 20 1423      13.2 g/dL 19 1145    Hct 31.9 % 20 1410      31.5 % 20 1423      40.9 % 19 1145    ABO O  19 1145    Rh Positive  19 1145    Antibody Screen Negative  19 1145    Glucose Fasting GTT       Glucose Tolerance Test 1 hour       Glucose Tolerance Test 3 hour       Gonorrhea (discrete)       Chlamydia (discrete)       RPR Non-Reactive  19 1145    VDRL       Syphilis Antibody       Rubella Positive  19 1145    HBsAg Non-Reactive  19 1145    Herpes Simplex Virus PCR       Herpes Simplex VIrus Culture       HIV Non-Reactive  19 1145    Hep C RNA Quant PCR       Hep C Antibody Non-Reactive  19 1145    AFP       Group B Strep       GBS Susceptibility to Clindamycin       GBS Susceptibility to Erythromycin       Fetal Fibronectin       Genetic Testing, Maternal Blood             Drug Screening     Test Value Date Time    Urine Drug Screen       Amphetamine Screen Negative ng/mL 02/27/15 1215    Barbiturate Screen Negative ng/mL 02/27/15 1215    Benzodiazepine Screen Negative ng/mL 02/27/15 1215    Methadone Screen Negative ng/mL 02/27/15 1215    Phencyclidine Screen Negative ng/mL 02/27/15 1215    Opiates Screen       THC Screen       Cocaine Screen       Propoxyphene Screen Negative ng/mL 02/27/15 1215    Buprenorphine Screen Negative ng/mL 02/27/15 1215    Methamphetamine Screen       Oxycodone Screen Negative ng/mL 02/27/15 1215    Tricyclic Antidepressants Screen                     Past OB History:       OB History    Para Term  AB Living   2 1 1 0 0 1   SAB TAB Ectopic Molar Multiple Live Births   0 0 0 0 0 1      # Outcome Date GA Lbr Mario/2nd Weight Sex Delivery Anes PTL Lv   2 Current            1 Term 10/2015 41w0d  3685 g (8 lb 2 oz) F Vag-Spont EPI N NISHA       Obstetric Comments   FOB #1-G1   FOB #2-G2       Past Medical History: Past Medical History:   Diagnosis Date   • Urinary tract infection       Past Surgical History Past Surgical History:   Procedure Laterality Date   • FOOT SURGERY Right       Family History: Family History   Problem Relation Age of Onset   • Hypertension Maternal Grandmother    • Hypertension Mother    • Diabetes Mother       Social History:  reports that she has quit smoking. She has never used smokeless tobacco.   reports that she drank alcohol.   reports that she has current or past drug history. Drug: Marijuana.                   General ROS Negative Findings:Headaches, Visual Changes, Epigastric pain, Anorexia, Nausia/Vomiting, ROM and Vaginal Bleeding    ROS     All other systems have been reviewed and are neg  Objective       Vital Signs Range for the last 24 hours  Temperature: Temp:  [97.8 °F (36.6 °C)] 97.8 °F (36.6 °C)   Temp Source: Temp src: Oral   BP: BP: (117)/(60) 117/60   Pulse: Heart Rate:  [110] 110   Respirations: Resp:  [16] 16   SPO2:     O2 Amount (l/min):     O2 Devices     Weight: Weight:  [117 kg (257 lb)] 117 kg (257 lb)     Physical Examination:   General:   alert, appears stated age and cooperative   Skin:   normal   HEENT:  Sclera clear   Lungs:      Heart:      Abdomen:  Soft, gravid uterus, no guarding, rebound benign exam   Lower Extremities  no edema, no calf transfer range of motion   Pelvis:  External genitalia: normal general appearance  Uterus: enlarged     Neuro no focal deficits noted DTRs 2+ 4 no clonus    Presentation: breech   Cervix: Exam by:     Dilation:  ft   Effacement:  50    Station:  ne  No blood noted on glove.       Fetal Heart Rate Assessment   Method: Fetal HR Assessment Method: external   Beats/min: Fetal HR (beats/min): 145   Baseline: Fetal Heart Baseline Rate: normal range   Varibility: Fetal HR Variability: moderate (amplitude range 6 to 25 bpm)   Accels: Fetal HR Accelerations:  greater than/equal to 15 bpm, lasting at least 15 seconds   Decels: Fetal HR Decelerations: absent   Tracing Category:     NST-indications twins, back pain.  Interpretation reactive x2, moderate variability, accelerations present 15 x 15, no decelerations noted, onset 1649 end time 1726, no contractions noted  Uterine Assessment   Method: Method: external tocotransducer   Frequency (min):     Ctx Count in 10 min:     Duration:     Intensity: Contraction Intensity: no contractions   Intensity by IUPC:     Resting Tone: Uterine Resting Tone: soft by palpation   Resting Tone by IUPC:     [place] Units:       Laboratory Results:   Lab Results (last 24 hours)     Procedure Component Value Units Date/Time    Urinalysis With Microscopic If Indicated (No Culture) - Urine, Clean Catch [[id number]]  (Normal) Collected:  20    Specimen:  Urine, Clean Catch Updated:  20     Color, UA Yellow     Appearance, UA Clear     pH, UA 6.5     Specific Gravity, UA 1.010     Glucose, UA Negative     Ketones, UA Negative     Bilirubin, UA Negative     Blood, UA Negative     Protein, UA Negative     Leuk Esterase, UA Negative     Nitrite, UA Negative     Urobilinogen, UA 0.2 E.U./dL    Narrative:       Urine microscopic not indicated.        Radiology Review:   Imaging Results (Last 24 Hours)     ** No results found for the last 24 hours. **        Other Studies:    Assessment/Plan       Twin pregnancy, dichorionic/diamniotic, unspecified trimester    Pregnancy    Dichorionic diamniotic twin gestation        Assessment:  1.  Intrauterine pregnancy at 33w6d weeks Di/Di twin gestation with reactive fetal status.    2.  No evidence of labor rupture membranes  3.  No evidence of UTI  4.      Plan:  1.  Discharged home,  labor instructions given, discussed with patient duration, hydration, and activity.  Follow-up Dr. Ramon routine  visit.  2. Plan of care has been reviewed with patient.  3.  Risks,  benefits of treatment plan have been discussed.  4.  All questions have been answered.  5      Akhil Hadley,   5/9/2020  17:28

## 2020-05-19 ENCOUNTER — ROUTINE PRENATAL (OUTPATIENT)
Dept: OBSTETRICS AND GYNECOLOGY | Facility: CLINIC | Age: 30
End: 2020-05-19

## 2020-05-19 VITALS — WEIGHT: 255 LBS | SYSTOLIC BLOOD PRESSURE: 120 MMHG | BODY MASS INDEX: 38.77 KG/M2 | DIASTOLIC BLOOD PRESSURE: 78 MMHG

## 2020-05-19 DIAGNOSIS — Z34.83 PRENATAL CARE, SUBSEQUENT PREGNANCY, THIRD TRIMESTER: Primary | ICD-10-CM

## 2020-05-19 DIAGNOSIS — O30.049 TWIN PREGNANCY, DICHORIONIC/DIAMNIOTIC, UNSPECIFIED TRIMESTER: ICD-10-CM

## 2020-05-19 DIAGNOSIS — E66.9 OBESITY (BMI 30-39.9): ICD-10-CM

## 2020-05-19 DIAGNOSIS — Z3A.35 35 WEEKS GESTATION OF PREGNANCY: ICD-10-CM

## 2020-05-19 LAB
GLUCOSE UR STRIP-MCNC: NEGATIVE MG/DL
PROT UR STRIP-MCNC: NEGATIVE MG/DL

## 2020-05-19 PROCEDURE — 99213 OFFICE O/P EST LOW 20 MIN: CPT | Performed by: OBSTETRICS & GYNECOLOGY

## 2020-05-19 NOTE — PROGRESS NOTES
Chief Complaint   Patient presents with   • Routine Prenatal Visit     ob visit with gbs culture and patient is requesting a date for induction       HPI: Demetria is a  currently at 35w2d who today reports the following:Headache - No ; Visual change - No ; Swelling in legs - No ; Nausea - No ; Constipation - No; Diarrhea - No ; Contractions - No ; Leaking fluid - No ; Vaginal bleeding -  No.      ROS: Constitutional: positive for fatigue, negative for chills and fevers  Respiratory: negative for cough and dyspnea on exertion  Vitals: See prenatal flowsheet     EXAM:  Abdomen: See prenatal flowsheet   Urine glucose/protein: See prenatal flowsheet   Pelvic: See prenatal flowsheet     Prenatal Labs  Lab Results   Component Value Date    HGB 10.6 (L) 2020    RUBELLAIGGIN Immune 2015    RUBELLAABIGG Positive 2019    HEPBSAG Non-Reactive 2019    LABRPR Non-reactive 2015    ABORH O Rh Positive 10/18/2015    ABO O 2019    RH Positive 2019    ABSCRN Negative 2019    LABANTI Negative 2015    PDBKACC08 NonReactive 2015    WKC9DLZ5 Non-Reactive 2019    HEPCVIRUSABY Non-Reactive 2019    KRAARIB9BI 117 2015    URINECX No growth 2019       MDM:High Risk Pregnancy    Impression:Multiparous patient with medical complications, Twin pregnancy - DC/DA and malpresentation  Tests done today: GBS testing  Specific topics discussed at today's visit: Questions answered regarding COVID-19 and revision of office schedule of visits due to pandemic  Birth plan  Labor signs and symptoms  maternal perception of fetal movement   Next visit:none

## 2020-05-26 ENCOUNTER — ROUTINE PRENATAL (OUTPATIENT)
Dept: OBSTETRICS AND GYNECOLOGY | Facility: CLINIC | Age: 30
End: 2020-05-26

## 2020-05-26 VITALS — DIASTOLIC BLOOD PRESSURE: 82 MMHG | SYSTOLIC BLOOD PRESSURE: 130 MMHG | WEIGHT: 259 LBS | BODY MASS INDEX: 39.38 KG/M2

## 2020-05-26 DIAGNOSIS — O30.049 TWIN PREGNANCY, DICHORIONIC/DIAMNIOTIC, UNSPECIFIED TRIMESTER: ICD-10-CM

## 2020-05-26 DIAGNOSIS — Z3A.36 36 WEEKS GESTATION OF PREGNANCY: Primary | ICD-10-CM

## 2020-05-26 LAB
GLUCOSE UR STRIP-MCNC: NEGATIVE MG/DL
PROT UR STRIP-MCNC: NEGATIVE MG/DL

## 2020-05-26 PROCEDURE — 99213 OFFICE O/P EST LOW 20 MIN: CPT | Performed by: OBSTETRICS & GYNECOLOGY

## 2020-05-26 NOTE — PROGRESS NOTES
Chief Complaint   Patient presents with   • Routine Prenatal Visit     ob visit       HPI: Demetria is a  currently at 36w2d who today reports the following:Headache - No ; Visual change - No ; Swelling in legs - No ; Nausea - No ; Constipation - No; Diarrhea - No ; Contractions - No ; Leaking fluid - No ; Vaginal bleeding -  No.      ROS: Constitutional: negative for fever, chills, activity change, appetite change, fatigue and unexpected weight change.  Respiratory: negative for cough and dyspnea on exertion  Vitals: See prenatal flowsheet     EXAM:  Abdomen: See prenatal flowsheet   Urine glucose/protein: See prenatal flowsheet   Pelvic: See prenatal flowsheet     Prenatal Labs  Lab Results   Component Value Date    HGB 10.6 (L) 2020    RUBELLAIGGIN Immune 2015    RUBELLAABIGG Positive 2019    HEPBSAG Non-Reactive 2019    LABRPR Non-reactive 2015    ABORH O Rh Positive 10/18/2015    ABO O 2019    RH Positive 2019    ABSCRN Negative 2019    LABANTI Negative 2015    JPZZCLM15 NonReactive 2015    GQU1TVF8 Non-Reactive 2019    HEPCVIRUSABY Non-Reactive 2019    RWBTLQI1SP 117 2015    URINECX No growth 2019       MDM: High Risk Pregnancy  Impression:Multiparous patient with medical complications, Twin pregnancy - DC/DA and malpresentation.  Delivery is Planned  Tests done today: none  Specific topics discussed at today's visit: Questions answered regarding COVID-19 and revision of office schedule of visits due to pandemic  Birth plan  Next visit:none

## 2020-05-29 ENCOUNTER — RESULTS ENCOUNTER (OUTPATIENT)
Dept: OBSTETRICS AND GYNECOLOGY | Facility: CLINIC | Age: 30
End: 2020-05-29

## 2020-05-29 DIAGNOSIS — Z34.83 PRENATAL CARE, SUBSEQUENT PREGNANCY, THIRD TRIMESTER: ICD-10-CM

## 2020-06-02 ENCOUNTER — ROUTINE PRENATAL (OUTPATIENT)
Dept: OBSTETRICS AND GYNECOLOGY | Facility: CLINIC | Age: 30
End: 2020-06-02

## 2020-06-02 VITALS — SYSTOLIC BLOOD PRESSURE: 120 MMHG | DIASTOLIC BLOOD PRESSURE: 82 MMHG | BODY MASS INDEX: 39.99 KG/M2 | WEIGHT: 263 LBS

## 2020-06-02 DIAGNOSIS — O30.043 DICHORIONIC DIAMNIOTIC TWIN PREGNANCY IN THIRD TRIMESTER: ICD-10-CM

## 2020-06-02 DIAGNOSIS — E66.9 OBESITY (BMI 30-39.9): ICD-10-CM

## 2020-06-02 DIAGNOSIS — Z3A.37 37 WEEKS GESTATION OF PREGNANCY: Primary | ICD-10-CM

## 2020-06-02 PROCEDURE — 99213 OFFICE O/P EST LOW 20 MIN: CPT | Performed by: OBSTETRICS & GYNECOLOGY

## 2020-06-02 NOTE — PROGRESS NOTES
Chief Complaint   Patient presents with   • Routine Prenatal Visit     ob visit        HPI: Comfort is a  currently at 37w2d who today reports the following:Headache - No ; Visual change - No ; Swelling in legs - No ; Nausea - No ; Constipation - No; Diarrhea - No ; Contractions - No ; Leaking fluid - No ; Vaginal bleeding -  No.      ROS: Pertinent items are noted in HPI.  Vitals: See prenatal flowsheet     EXAM:  Abdomen: See prenatal flowsheet   Urine glucose/protein: See prenatal flowsheet   Pelvic: See prenatal flowsheet     Prenatal Labs  Lab Results   Component Value Date    HGB 10.6 (L) 2020    RUBELLAIGGIN Immune 2015    RUBELLAABIGG Positive 2019    HEPBSAG Non-Reactive 2019    LABRPR Non-reactive 2015    ABORH O Rh Positive 10/18/2015    ABO O 2019    RH Positive 2019    ABSCRN Negative 2019    LABANTI Negative 2015    UTDOJQO08 NonReactive 2015    JRU5WEG0 Non-Reactive 2019    HEPCVIRUSABY Non-Reactive 2019    VTURVSW4MM 117 2015    URINECX No growth 2019       MDM:High Risk Pregnancy    Impression:Multiparous patient with medical complications, Twin pregnancy - DC/DA, Obesity and Malpresentation, C section is planned  Tests done today: none  Specific topics discussed at today's visit: Questions answered regarding COVID-19 and revision of office schedule of visits due to pandemic  Birth plan  c section risk, benefits, post op and PAT issues  Next visit:none

## 2020-06-05 ENCOUNTER — APPOINTMENT (OUTPATIENT)
Dept: PREADMISSION TESTING | Facility: HOSPITAL | Age: 30
End: 2020-06-05

## 2020-06-05 VITALS — BODY MASS INDEX: 41.07 KG/M2 | HEIGHT: 67 IN | WEIGHT: 261.69 LBS

## 2020-06-05 DIAGNOSIS — O30.049 TWIN PREGNANCY, DICHORIONIC/DIAMNIOTIC, UNSPECIFIED TRIMESTER: ICD-10-CM

## 2020-06-05 LAB
ABO GROUP BLD: NORMAL
BLD GP AB SCN SERPL QL: NEGATIVE
DEPRECATED RDW RBC AUTO: 47.7 FL (ref 37–54)
ERYTHROCYTE [DISTWIDTH] IN BLOOD BY AUTOMATED COUNT: 14.6 % (ref 12.3–15.4)
HCT VFR BLD AUTO: 34.1 % (ref 34–46.6)
HGB BLD-MCNC: 10.8 G/DL (ref 12–15.9)
MCH RBC QN AUTO: 28.6 PG (ref 26.6–33)
MCHC RBC AUTO-ENTMCNC: 31.7 G/DL (ref 31.5–35.7)
MCV RBC AUTO: 90.2 FL (ref 79–97)
PLATELET # BLD AUTO: 291 10*3/MM3 (ref 140–450)
PMV BLD AUTO: 10.7 FL (ref 6–12)
RBC # BLD AUTO: 3.78 10*6/MM3 (ref 3.77–5.28)
RH BLD: POSITIVE
T&S EXPIRATION DATE: NORMAL
WBC NRBC COR # BLD: 7.51 10*3/MM3 (ref 3.4–10.8)

## 2020-06-05 PROCEDURE — 86850 RBC ANTIBODY SCREEN: CPT | Performed by: OBSTETRICS & GYNECOLOGY

## 2020-06-05 PROCEDURE — C9803 HOPD COVID-19 SPEC COLLECT: HCPCS | Performed by: OBSTETRICS & GYNECOLOGY

## 2020-06-05 PROCEDURE — 36415 COLL VENOUS BLD VENIPUNCTURE: CPT

## 2020-06-05 PROCEDURE — 85027 COMPLETE CBC AUTOMATED: CPT | Performed by: OBSTETRICS & GYNECOLOGY

## 2020-06-05 PROCEDURE — 86900 BLOOD TYPING SEROLOGIC ABO: CPT | Performed by: OBSTETRICS & GYNECOLOGY

## 2020-06-05 PROCEDURE — U0002 COVID-19 LAB TEST NON-CDC: HCPCS | Performed by: OBSTETRICS & GYNECOLOGY

## 2020-06-05 PROCEDURE — 86901 BLOOD TYPING SEROLOGIC RH(D): CPT | Performed by: OBSTETRICS & GYNECOLOGY

## 2020-06-05 NOTE — DISCHARGE INSTRUCTIONS
What to know before your arrive:     -Do not eat, drink or chew gum after midnight the day before your procedure.    This also includes mints.   -Do not shave any part of your body including abdomen or pelvic are for two    days before your procedure.   -If you are taking a scheduled medication (insulin, blood pressure medicine,   antibiotics) please consult with your physician whether to take on the day of   surgery.   -Remove all jewelry including rings, wedding bands, and piercing before coming   to the hospital.   -Leave important valuables at home.   -Do not wear dark fingernail polish.   -Bring the following with you to the hospital:    -Picture ID and insurance, Medicare or Medicaid cards    -Co-pay/deductible required by insurance (Cash, Check, Credit Card)    -Copy of living will or power  document (if applicable)    -CPAP mask and tubing, not machine (if applicable)    -Skin prep instructions sheet    What to know the day of procedure:     -Park in the [facility], take elevator for first floor, exit to the right and  proceed through the doors to outside, follow the covered sidewalk to the  entrance of the [place], follow the hallway and signs to the [facility],  enter the [place] to your right BEFORE entering the 1720 lobby.  Take the  elevators to the 3rd floor ([address] [place]).   -Leave unnecessary items in your vehicle, including your suitcase.  Your support  person or a family member can get it for you after your procedure.   -Check in at the reception desk in the lobby of the 3rd floor ([address] [place] [facility]).   -One person may accompany you to the pre-op/recovery area.  Please have  other family members wait in the waiting room.   -An anesthesiologist will meet with your prior to your procedure.   -After anesthesia has been initiated, one person may accompany you in the  operating room.   -No video cameras are permitted in the operating room; only still cameras,  Please.      What to  expect while you are in recovery:     -One person may stay with you while you are in recovery.   -If the baby is stable, he/she may visit to initiate breastfeeding & [facility].    THE FOLLOWING INFORMATION WAS PROVIDED TO PATIENT:     SECTION BOOKLET BY [facility]  PAIN MANAGEMENT   RESPIREX    CHLORHEXIDINE GLUCONATE WIPES AND INSTRUCTIONS GIVEN TO PATIENT

## 2020-06-05 NOTE — PAT
NO PCP    COVID TESTED DONE TODAY    It was noted during Pre Admission Testing that patient was wearing some form of fingernail polish (gel/regular) and/or acrylic/artificial nails.  Patient was told that polish and/or artificial nails must be removed for surgery.  If a patient had recent manicure, and would rather not remove polish or artificial nails. Then the minimum requirement is that the polish/artificial nails must be removed from the middle finger on each hand.  Patient verbalized understanding.    If patient was having surgery on an upper extremity, then the patient was instructed that fingernail polish/artificial fingernails must be removed for surgery.  NO EXCEPTIONS.  Patient verbalized understanding.    If patient was having surgery on a lower extremity, then the patient was instructed that toenail polish on both extremities must be removed for surgery.  NO EXCEPTIONS. Patient verbalized understanding.    Patient instructed to remove all jewelry for procedure.  Patient was instructed that if unable to remove jewelry especially rings to request assistance from a jeweler. Explained that if the piece of jewelry could not be removed before arrival to preop that it will be cut off.  Reinforced with patient that all jewelry must be removed for safety reasons that taping a ring was not an option.  Patient verbalized understanding.    PATIENT PLANS TO BREAST AND BOTTLE FEED.    PATIENT STATES SHE DID NOT RECEIVE RHOGAM.    Patient to apply Chlorhexadine wipes  to surgical area (as instructed) the night before procedure and the AM of procedure. Wipes provided.    Blood bank bracelet applied to patient during Pre Admission Testing visit.  Patient instructed not to remove from arm until after procedure and they are discharged from the hospital.  Explained to patient that they may shower and get the bracelet wet, but not to immerse under water for longer periods (bathing, swimming, hand dishwashing, etc).  Patient  verbalized understanding.

## 2020-06-06 LAB
REF LAB TEST METHOD: NORMAL
SARS-COV-2 RNA RESP QL NAA+PROBE: NOT DETECTED

## 2020-06-08 ENCOUNTER — ANESTHESIA (OUTPATIENT)
Dept: LABOR AND DELIVERY | Facility: HOSPITAL | Age: 30
End: 2020-06-08

## 2020-06-08 ENCOUNTER — ANESTHESIA EVENT (OUTPATIENT)
Dept: LABOR AND DELIVERY | Facility: HOSPITAL | Age: 30
End: 2020-06-08

## 2020-06-08 ENCOUNTER — HOSPITAL ENCOUNTER (INPATIENT)
Facility: HOSPITAL | Age: 30
LOS: 2 days | Discharge: HOME | End: 2020-06-10
Attending: OBSTETRICS & GYNECOLOGY | Admitting: OBSTETRICS & GYNECOLOGY

## 2020-06-08 DIAGNOSIS — O30.049 TWIN PREGNANCY, DICHORIONIC/DIAMNIOTIC, UNSPECIFIED TRIMESTER: ICD-10-CM

## 2020-06-08 PROCEDURE — 25010000003 MORPHINE PER 10 MG: Performed by: NURSE ANESTHETIST, CERTIFIED REGISTERED

## 2020-06-08 PROCEDURE — 25010000002 ONDANSETRON PER 1 MG: Performed by: NURSE ANESTHETIST, CERTIFIED REGISTERED

## 2020-06-08 PROCEDURE — 59514 CESAREAN DELIVERY ONLY: CPT | Performed by: OBSTETRICS & GYNECOLOGY

## 2020-06-08 PROCEDURE — 59025 FETAL NON-STRESS TEST: CPT

## 2020-06-08 PROCEDURE — C1765 ADHESION BARRIER: HCPCS | Performed by: OBSTETRICS & GYNECOLOGY

## 2020-06-08 PROCEDURE — 25010000002 FENTANYL CITRATE (PF) 100 MCG/2ML SOLUTION: Performed by: NURSE ANESTHETIST, CERTIFIED REGISTERED

## 2020-06-08 PROCEDURE — 25010000002 KETOROLAC TROMETHAMINE PER 15 MG: Performed by: NURSE ANESTHETIST, CERTIFIED REGISTERED

## 2020-06-08 PROCEDURE — 88307 TISSUE EXAM BY PATHOLOGIST: CPT | Performed by: OBSTETRICS & GYNECOLOGY

## 2020-06-08 PROCEDURE — 25010000003 CEFAZOLIN IN DEXTROSE 2-4 GM/100ML-% SOLUTION: Performed by: OBSTETRICS & GYNECOLOGY

## 2020-06-08 RX ORDER — CEFAZOLIN SODIUM 2 G/100ML
2 INJECTION, SOLUTION INTRAVENOUS ONCE
Status: COMPLETED | OUTPATIENT
Start: 2020-06-08 | End: 2020-06-08

## 2020-06-08 RX ORDER — SODIUM CHLORIDE 0.9 % (FLUSH) 0.9 %
10 SYRINGE (ML) INJECTION AS NEEDED
Status: DISCONTINUED | OUTPATIENT
Start: 2020-06-08 | End: 2020-06-08 | Stop reason: HOSPADM

## 2020-06-08 RX ORDER — NALOXONE HCL 0.4 MG/ML
0.4 VIAL (ML) INJECTION
Status: DISCONTINUED | OUTPATIENT
Start: 2020-06-08 | End: 2020-06-10 | Stop reason: HOSPADM

## 2020-06-08 RX ORDER — SODIUM CHLORIDE 0.9 % (FLUSH) 0.9 %
3-10 SYRINGE (ML) INJECTION AS NEEDED
Status: DISCONTINUED | OUTPATIENT
Start: 2020-06-08 | End: 2020-06-10 | Stop reason: HOSPADM

## 2020-06-08 RX ORDER — OXYCODONE HYDROCHLORIDE AND ACETAMINOPHEN 5; 325 MG/1; MG/1
1 TABLET ORAL EVERY 4 HOURS PRN
Status: DISCONTINUED | OUTPATIENT
Start: 2020-06-08 | End: 2020-06-10 | Stop reason: HOSPADM

## 2020-06-08 RX ORDER — KETOROLAC TROMETHAMINE 30 MG/ML
30 INJECTION, SOLUTION INTRAMUSCULAR; INTRAVENOUS ONCE AS NEEDED
Status: COMPLETED | OUTPATIENT
Start: 2020-06-08 | End: 2020-06-08

## 2020-06-08 RX ORDER — HYDROMORPHONE HYDROCHLORIDE 1 MG/ML
0.5 INJECTION, SOLUTION INTRAMUSCULAR; INTRAVENOUS; SUBCUTANEOUS
Status: DISCONTINUED | OUTPATIENT
Start: 2020-06-08 | End: 2020-06-08 | Stop reason: HOSPADM

## 2020-06-08 RX ORDER — FENTANYL CITRATE 50 UG/ML
INJECTION, SOLUTION INTRAMUSCULAR; INTRAVENOUS AS NEEDED
Status: DISCONTINUED | OUTPATIENT
Start: 2020-06-08 | End: 2020-06-08 | Stop reason: SURG

## 2020-06-08 RX ORDER — OXYCODONE AND ACETAMINOPHEN 7.5; 325 MG/1; MG/1
1 TABLET ORAL ONCE AS NEEDED
Status: DISCONTINUED | OUTPATIENT
Start: 2020-06-08 | End: 2020-06-08 | Stop reason: HOSPADM

## 2020-06-08 RX ORDER — TRISODIUM CITRATE DIHYDRATE AND CITRIC ACID MONOHYDRATE 500; 334 MG/5ML; MG/5ML
30 SOLUTION ORAL ONCE
Status: COMPLETED | OUTPATIENT
Start: 2020-06-08 | End: 2020-06-08

## 2020-06-08 RX ORDER — SODIUM CHLORIDE 0.9 % (FLUSH) 0.9 %
3 SYRINGE (ML) INJECTION EVERY 12 HOURS SCHEDULED
Status: DISCONTINUED | OUTPATIENT
Start: 2020-06-08 | End: 2020-06-08 | Stop reason: HOSPADM

## 2020-06-08 RX ORDER — MORPHINE SULFATE 2 MG/ML
2 INJECTION, SOLUTION INTRAMUSCULAR; INTRAVENOUS EVERY 4 HOURS PRN
Status: DISCONTINUED | OUTPATIENT
Start: 2020-06-08 | End: 2020-06-10 | Stop reason: HOSPADM

## 2020-06-08 RX ORDER — PROMETHAZINE HYDROCHLORIDE 25 MG/ML
12.5 INJECTION, SOLUTION INTRAMUSCULAR; INTRAVENOUS EVERY 6 HOURS PRN
Status: DISCONTINUED | OUTPATIENT
Start: 2020-06-08 | End: 2020-06-10 | Stop reason: HOSPADM

## 2020-06-08 RX ORDER — SIMETHICONE 80 MG
80 TABLET,CHEWABLE ORAL
Status: DISCONTINUED | OUTPATIENT
Start: 2020-06-08 | End: 2020-06-10 | Stop reason: HOSPADM

## 2020-06-08 RX ORDER — CARBOPROST TROMETHAMINE 250 UG/ML
250 INJECTION, SOLUTION INTRAMUSCULAR AS NEEDED
Status: DISCONTINUED | OUTPATIENT
Start: 2020-06-08 | End: 2020-06-08 | Stop reason: HOSPADM

## 2020-06-08 RX ORDER — ONDANSETRON 2 MG/ML
4 INJECTION INTRAMUSCULAR; INTRAVENOUS ONCE
Status: DISCONTINUED | OUTPATIENT
Start: 2020-06-08 | End: 2020-06-08 | Stop reason: HOSPADM

## 2020-06-08 RX ORDER — LANOLIN
CREAM (ML) TOPICAL
Status: DISCONTINUED | OUTPATIENT
Start: 2020-06-08 | End: 2020-06-10 | Stop reason: HOSPADM

## 2020-06-08 RX ORDER — DOCUSATE SODIUM 100 MG/1
100 CAPSULE, LIQUID FILLED ORAL 2 TIMES DAILY
Status: DISCONTINUED | OUTPATIENT
Start: 2020-06-08 | End: 2020-06-10 | Stop reason: HOSPADM

## 2020-06-08 RX ORDER — BUPIVACAINE HYDROCHLORIDE 7.5 MG/ML
INJECTION, SOLUTION EPIDURAL; RETROBULBAR AS NEEDED
Status: DISCONTINUED | OUTPATIENT
Start: 2020-06-08 | End: 2020-06-08 | Stop reason: SURG

## 2020-06-08 RX ORDER — MISOPROSTOL 200 UG/1
800 TABLET ORAL AS NEEDED
Status: DISCONTINUED | OUTPATIENT
Start: 2020-06-08 | End: 2020-06-08 | Stop reason: HOSPADM

## 2020-06-08 RX ORDER — DIPHENHYDRAMINE HCL 25 MG
25 CAPSULE ORAL EVERY 4 HOURS PRN
Status: DISCONTINUED | OUTPATIENT
Start: 2020-06-08 | End: 2020-06-10 | Stop reason: HOSPADM

## 2020-06-08 RX ORDER — FAMOTIDINE 10 MG/ML
INJECTION, SOLUTION INTRAVENOUS AS NEEDED
Status: DISCONTINUED | OUTPATIENT
Start: 2020-06-08 | End: 2020-06-08 | Stop reason: SURG

## 2020-06-08 RX ORDER — NALOXONE HCL 0.4 MG/ML
0.4 VIAL (ML) INJECTION
Status: ACTIVE | OUTPATIENT
Start: 2020-06-08 | End: 2020-06-09

## 2020-06-08 RX ORDER — PROMETHAZINE HYDROCHLORIDE 25 MG/1
25 TABLET ORAL EVERY 6 HOURS PRN
Status: DISCONTINUED | OUTPATIENT
Start: 2020-06-08 | End: 2020-06-10 | Stop reason: HOSPADM

## 2020-06-08 RX ORDER — OXYTOCIN-SODIUM CHLORIDE 0.9% IV SOLN 30 UNIT/500ML 30-0.9/5 UT/ML-%
650 SOLUTION INTRAVENOUS ONCE
Status: DISCONTINUED | OUTPATIENT
Start: 2020-06-08 | End: 2020-06-08 | Stop reason: HOSPADM

## 2020-06-08 RX ORDER — OXYCODONE AND ACETAMINOPHEN 10; 325 MG/1; MG/1
1 TABLET ORAL EVERY 4 HOURS PRN
Status: DISCONTINUED | OUTPATIENT
Start: 2020-06-08 | End: 2020-06-10 | Stop reason: HOSPADM

## 2020-06-08 RX ORDER — LIDOCAINE HYDROCHLORIDE 10 MG/ML
5 INJECTION, SOLUTION EPIDURAL; INFILTRATION; INTRACAUDAL; PERINEURAL AS NEEDED
Status: DISCONTINUED | OUTPATIENT
Start: 2020-06-08 | End: 2020-06-08 | Stop reason: HOSPADM

## 2020-06-08 RX ORDER — SODIUM CHLORIDE, SODIUM LACTATE, POTASSIUM CHLORIDE, CALCIUM CHLORIDE 600; 310; 30; 20 MG/100ML; MG/100ML; MG/100ML; MG/100ML
125 INJECTION, SOLUTION INTRAVENOUS CONTINUOUS
Status: DISCONTINUED | OUTPATIENT
Start: 2020-06-08 | End: 2020-06-08 | Stop reason: HOSPADM

## 2020-06-08 RX ORDER — SODIUM CHLORIDE 0.9 % (FLUSH) 0.9 %
3 SYRINGE (ML) INJECTION EVERY 12 HOURS SCHEDULED
Status: DISCONTINUED | OUTPATIENT
Start: 2020-06-08 | End: 2020-06-10 | Stop reason: HOSPADM

## 2020-06-08 RX ORDER — CARBOPROST TROMETHAMINE 250 UG/ML
250 INJECTION, SOLUTION INTRAMUSCULAR ONCE
Status: DISCONTINUED | OUTPATIENT
Start: 2020-06-08 | End: 2020-06-10 | Stop reason: HOSPADM

## 2020-06-08 RX ORDER — METHYLERGONOVINE MALEATE 0.2 MG/ML
200 INJECTION INTRAVENOUS ONCE AS NEEDED
Status: DISCONTINUED | OUTPATIENT
Start: 2020-06-08 | End: 2020-06-08 | Stop reason: HOSPADM

## 2020-06-08 RX ORDER — DIPHENHYDRAMINE HYDROCHLORIDE 50 MG/ML
25 INJECTION INTRAMUSCULAR; INTRAVENOUS EVERY 4 HOURS PRN
Status: DISCONTINUED | OUTPATIENT
Start: 2020-06-08 | End: 2020-06-10 | Stop reason: HOSPADM

## 2020-06-08 RX ORDER — PROMETHAZINE HYDROCHLORIDE 12.5 MG/1
12.5 SUPPOSITORY RECTAL EVERY 6 HOURS PRN
Status: DISCONTINUED | OUTPATIENT
Start: 2020-06-08 | End: 2020-06-10 | Stop reason: HOSPADM

## 2020-06-08 RX ORDER — OXYTOCIN-SODIUM CHLORIDE 0.9% IV SOLN 30 UNIT/500ML 30-0.9/5 UT/ML-%
85 SOLUTION INTRAVENOUS ONCE
Status: DISCONTINUED | OUTPATIENT
Start: 2020-06-08 | End: 2020-06-08 | Stop reason: HOSPADM

## 2020-06-08 RX ORDER — ONDANSETRON 2 MG/ML
INJECTION INTRAMUSCULAR; INTRAVENOUS AS NEEDED
Status: DISCONTINUED | OUTPATIENT
Start: 2020-06-08 | End: 2020-06-08 | Stop reason: SURG

## 2020-06-08 RX ORDER — IBUPROFEN 600 MG/1
600 TABLET ORAL EVERY 6 HOURS SCHEDULED
Status: DISCONTINUED | OUTPATIENT
Start: 2020-06-08 | End: 2020-06-10 | Stop reason: HOSPADM

## 2020-06-08 RX ORDER — MORPHINE SULFATE 0.5 MG/ML
INJECTION, SOLUTION EPIDURAL; INTRATHECAL; INTRAVENOUS AS NEEDED
Status: DISCONTINUED | OUTPATIENT
Start: 2020-06-08 | End: 2020-06-08 | Stop reason: SURG

## 2020-06-08 RX ORDER — OXYTOCIN-SODIUM CHLORIDE 0.9% IV SOLN 30 UNIT/500ML 30-0.9/5 UT/ML-%
SOLUTION INTRAVENOUS AS NEEDED
Status: DISCONTINUED | OUTPATIENT
Start: 2020-06-08 | End: 2020-06-08 | Stop reason: SURG

## 2020-06-08 RX ORDER — MISOPROSTOL 200 UG/1
600 TABLET ORAL ONCE
Status: DISCONTINUED | OUTPATIENT
Start: 2020-06-08 | End: 2020-06-10 | Stop reason: HOSPADM

## 2020-06-08 RX ADMIN — SIMETHICONE 80 MG: 80 TABLET, CHEWABLE ORAL at 19:56

## 2020-06-08 RX ADMIN — SODIUM CITRATE AND CITRIC ACID MONOHYDRATE 30 ML: 500; 334 SOLUTION ORAL at 13:31

## 2020-06-08 RX ADMIN — IBUPROFEN 600 MG: 600 TABLET, FILM COATED ORAL at 23:40

## 2020-06-08 RX ADMIN — DOCUSATE SODIUM 100 MG: 100 CAPSULE, LIQUID FILLED ORAL at 19:56

## 2020-06-08 RX ADMIN — FAMOTIDINE 20 MG: 10 INJECTION, SOLUTION INTRAVENOUS at 13:39

## 2020-06-08 RX ADMIN — BUPIVACAINE HYDROCHLORIDE 1.6 ML: 7.5 INJECTION, SOLUTION EPIDURAL; RETROBULBAR at 13:47

## 2020-06-08 RX ADMIN — OXYCODONE HYDROCHLORIDE AND ACETAMINOPHEN 1 TABLET: 5; 325 TABLET ORAL at 19:07

## 2020-06-08 RX ADMIN — SODIUM CHLORIDE, POTASSIUM CHLORIDE, SODIUM LACTATE AND CALCIUM CHLORIDE 1000 ML/HR: 600; 310; 30; 20 INJECTION, SOLUTION INTRAVENOUS at 13:26

## 2020-06-08 RX ADMIN — OXYTOCIN 500 ML: 10 INJECTION INTRAVENOUS at 14:19

## 2020-06-08 RX ADMIN — SODIUM CHLORIDE, POTASSIUM CHLORIDE, SODIUM LACTATE AND CALCIUM CHLORIDE 125 ML/HR: 600; 310; 30; 20 INJECTION, SOLUTION INTRAVENOUS at 15:59

## 2020-06-08 RX ADMIN — FENTANYL CITRATE 15 MCG: 50 INJECTION, SOLUTION INTRAMUSCULAR; INTRAVENOUS at 13:47

## 2020-06-08 RX ADMIN — ONDANSETRON 4 MG: 2 INJECTION INTRAMUSCULAR; INTRAVENOUS at 13:39

## 2020-06-08 RX ADMIN — SODIUM CHLORIDE, POTASSIUM CHLORIDE, SODIUM LACTATE AND CALCIUM CHLORIDE: 600; 310; 30; 20 INJECTION, SOLUTION INTRAVENOUS at 14:03

## 2020-06-08 RX ADMIN — CEFAZOLIN SODIUM 2 G: 2 INJECTION, SOLUTION INTRAVENOUS at 13:30

## 2020-06-08 RX ADMIN — KETOROLAC TROMETHAMINE 30 MG: 30 INJECTION, SOLUTION INTRAMUSCULAR at 16:00

## 2020-06-08 RX ADMIN — MORPHINE SULFATE 150 MCG: 0.5 INJECTION, SOLUTION EPIDURAL; INTRATHECAL; INTRAVENOUS at 13:47

## 2020-06-08 RX ADMIN — SODIUM CHLORIDE, POTASSIUM CHLORIDE, SODIUM LACTATE AND CALCIUM CHLORIDE 1000 ML: 600; 310; 30; 20 INJECTION, SOLUTION INTRAVENOUS at 12:25

## 2020-06-08 NOTE — PLAN OF CARE
Problem:  Delivery (Adult,Obstetrics,Pediatric)  Goal: Signs and Symptoms of Listed Potential Problems Will be Absent, Minimized or Managed ( Delivery)  2020 153 by Gretchen Griffith RN  Outcome: Outcome(s) achieved  2020 153 by Gretchen Griffith RN  Reactivated  2020 153 by Grecthen Griffith RN  Outcome: Outcome(s) achieved  2020 153 by Gretchen Griffith RN  Outcome: Outcome(s) achieved  Flowsheets (Taken 2020 153)  Problems Assessed ( Delivery): anaphylactoid syndrome  Problems Present ( Delivery): none

## 2020-06-08 NOTE — OP NOTE
Min Main  : 1990  MRN: [id number]  CSN: [id number]    Operative Report    Pre-Operative Dx:   1. IUP at 38w1d weeks   2. Malpresentation: Breech/Breech  3. Dichorionic/Diamniotic Twin gestation      Postoperative dx:    1. Same as above     Procedure: Primary  (LTCS) Single-Layer Closure       Surgeon: Mahesh Ramon MD    Assistant:        Anesthesia:    Bettina Main [[id number]]   Spinal     Bettina Main [[id number]]   Spinal         EBL: 900 mls.       Antibiotics: cefazolin 2 gms     Drains: Sidhu     Findings: Normal anatomy  Twin A: Female  Weight:2658 g  [place] Scores:  Twin B:Female  Weight: 2769 g  Apgar Scores:        Indications: IUP 38 weeks Twins Malpresentation for Primary cesarea Delivery. Risk discussed Questions answered           Procedure Details:   After the appropriate time out, the patient was prepped and draped in the usual sterile fashion.  She had been placed in the dorsal supine left lateral tilt position.  A sidhu catheter had been placed in the bladder for drainage during the procedure. A pfannenstiel skin incision was made with a knife and carried down to the fascia.  The fascia was nicked with a scalpel and the incision was extended bilaterally with curved Garces scissors. The superior aspect of the fascia was grasped with Kocher clamps x 2 and bluntly as well as sharply dissected away from the underlying rectus muscles.  A similar process was repeated inferiorly. The rectus muscles were  and the peritoneal cavity was entered sharply without complications. The bladder flap was created with Metzenbaum scissors and pickups with teeth.  The  retractor was placed and after checking for no entrapment, was retracted down.  A transverse uterine incision was made with the knife and extended bilaterally.  Twin A was delivered from the double footling breech presentation after artificial  rupture of membranes. The mouth and nose were bulb suctioned.  The cord was doubly clamped and cut and the infant handed to the pediatric nurse in attendance.  Twin B was delivered by from the double footling breech presentation with rupture of membranes.  The mouth and nose were bulb suctioned on the abdomen.  The infant was delivered to the waiting  staff.  Cord blood was obtained.  The placenta was manually extracted from the uterus.  The uteruswiped free of remaining membranes.  The uterine incision was closed with #1 chromic in a running locking fashion.  The lateral gutters were wiped free of remaining clots.  The site of surgical incision was inspected and noted to be hemostatic. The  retractor was removed.  Interceed was placed over the uterine incision. The subfascial tissue was inspected and noted to be hemostatic.  The peritoneum was closed using #1 chromic in a running continuous fashion.  The fascia was closed with 0 Vicryl in a running non-locking fashion in two segments.  Subcutaneous tissue was inspected and noted to be hemostatic with minimal bovie electrocautery.  Kelsie's fascia was closed with 3-0 chromic in a running non-locking fashion.  The skin was approximated with subcutaneous absorbable staples. Dressings were placed.  All instrument and sponge counts were correct at the end of the procedure. The patient tolerated the procedure well.  There were no complications.  She was taken to postoperative recovery room in stable condition.          Complications:   None      Disposition:   Mother to Mother Baby/Postpartum  in stable condition currently.   Babies to NBN  in stable condition currently.     Mahesh Ramon MD   2020  14:42

## 2020-06-08 NOTE — ANESTHESIA PROCEDURE NOTES
Spinal Block      Patient reassessed immediately prior to procedure    Patient location during procedure: OR  Indication:at surgeon's request  Performed By  Anesthesiologist: Alley Gibson DO  CRNA: Asiya Dumont CRNA  Preanesthetic Checklist  Completed: patient identified, surgical consent, pre-op evaluation, timeout performed, IV checked, risks and benefits discussed and monitors and equipment checked  Spinal Block Prep:  Patient Position:sitting  Sterile Tech:cap, gloves, mask and sterile barriers  Prep:Betadine  Patient Monitoring:blood pressure monitoring, continuous pulse oximetry and EKG  Spinal Block Procedure  Approach:midline  Guidance:palpation technique  Location:L3-L4  Needle Type:Ck  Needle Gauge:25 G  Placement of Spinal needle event:cerebrospinal fluid aspirated  Paresthesia: no  Fluid Appearance:clear     Post Assessment  Patient Tolerance:patient tolerated the procedure well with no apparent complications  Complications no

## 2020-06-08 NOTE — ANESTHESIA PREPROCEDURE EVALUATION
Anesthesia Evaluation     Patient summary reviewed and Nursing notes reviewed   NPO Solid Status: > 8 hours  NPO Liquid Status: > 8 hours           Airway   Dental      Pulmonary - negative pulmonary ROS   Cardiovascular - negative cardio ROS        Neuro/Psych- negative ROS  GI/Hepatic/Renal/Endo    (+) morbid obesity,      Musculoskeletal (-) negative ROS    Abdominal    Substance History - negative use     OB/GYN    (+) Pregnant,         Other - negative ROS                       Anesthesia Plan    ASA 3     ITN and spinal       Anesthetic plan, all risks, benefits, and alternatives have been provided, discussed and informed consent has been obtained with: patient.

## 2020-06-08 NOTE — PLAN OF CARE
Problem: Breastfeeding (Pediatric,[place],NICU)  Intervention: Support Exclusive Breastfeeding Success  Flowsheets (Taken 2020 1800)  Parent/Child Attachment Promotion: cue recognition promoted; positive reinforcement provided; participation in care promoted; rooming-in promoted; skin-to-skin contact encouraged  Intervention: Provide Support During Feeding Sessions  Flowsheets (Taken 2020 1800)  Breastfeeding Support: support offered; electric breast pump used; other (see comments)  Note:   Encouraged to pump q 3 hrs or anytime babies get formula fed  Intervention: Promote Positive Maternal Experience  Flowsheets (Taken 2020 1800)  Psychosocial Support: care explained to patient/family prior to performing; self-care promoted

## 2020-06-08 NOTE — LACTATION NOTE
06/08/20 1800   Maternal Information   Person Making Referral other (see comments)  (Courtesy visit, Teaching done.)   Maternal Reason for Referral other (see comments)  (Patient & FOB currently bottle feeding babies)   Equipment Type   Breast Pump Type double electric, personal  (Instructed how to use Spectra pump)   Breast Pump Flange Type hard   Breast Pump Flange Size 24 mm   Breast Pumping   Breast Pumping Interventions frequent pumping encouraged;other (see comments)  (Pump q 3 hrs for 15-20 minutes)   Breast Pumping bilateral breasts pumped until soft;other (see comments)  (Pump until breasts are softer when milk has come in)

## 2020-06-08 NOTE — H&P
GERBER Copeland  Obstetric History and Physical    No chief complaint on file.      Subjective     Patient is a 29 y.o. female  currently at 38w1d, who presents with planned c section for twins.    Her prenatal care is complicated by  multiple gestation  DC/DA twins and malpresentation  breech.  Her previous obstetric/gynecological history is noted for is remarkable for none.    The following portions of the patients history were reviewed and updated as appropriate: current medications, allergies, past medical history, past surgical history, past family history, past social history and problem list .       Prenatal Information:   Maternal Prenatal Labs  Blood Type No results found for: ABO   Rh Status No results found for: RH   Antibody Screen No results found for: ABSCRN   Gonnorhea No results found for: GCCX   Chlamydia No results found for: CLAMYDCU   RPR No results found for: RPR   Syphilis Antibody No results found for: SYPHILIS   Rubella No results found for: RUBELLAIGGIN, RUBELLAABIGG   Hepatitis B Surface Antigen No results found for: HEPBSAG   HIV-1 Antibody No results found for: LABHIV1   Hepatitis C Antibody No results found for: HEPCAB   Rapid Urin Drug Screen No results found for: AMPMETHU, BARBITSCNUR, LABBENZSCN, LABMETHSCN, LABOPIASCN, THCURSCR, COCAINEUR, COCSCRUR, AMPHETSCREEN, PROPOXSCN, BUPRENORSCNU, METHAMPSCNUR, OXYCODONESCN, TRICYCLICSCN   Group B Strep Culture No results found for: GBSANTIGEN           External Prenatal Results     Pregnancy Outside Results - Transcribed From Office Records - See Scanned Records For Details     Test Value Date Time    Hgb 10.8 g/dL 20 1151      10.6 g/dL 20 1410      10.1 g/dL 20 1423      13.2 g/dL 19 1145    Hct 34.1 % 20 1151      31.9 % 20 1410      31.5 % 20 1423      40.9 % 19 1145    ABO O  20 1151    Rh Positive  20 1151    Antibody Screen Negative  20 1151      Negative  19  1145    Glucose Fasting GTT       Glucose Tolerance Test 1 hour       Glucose Tolerance Test 3 hour       Gonorrhea (discrete)       Chlamydia (discrete)       RPR Non-Reactive  19 1145    VDRL       Syphilis Antibody       Rubella Positive  19 1145    HBsAg Non-Reactive  19 1145    Herpes Simplex Virus PCR       Herpes Simplex VIrus Culture       HIV Non-Reactive  19 1145    Hep C RNA Quant PCR       Hep C Antibody Non-Reactive  19 1145    AFP       Group B Strep       GBS Susceptibility to Clindamycin       GBS Susceptibility to Erythromycin       Fetal Fibronectin       Genetic Testing, Maternal Blood             Drug Screening     Test Value Date Time    Urine Drug Screen       Amphetamine Screen Negative ng/mL 02/27/15 1215    Barbiturate Screen Negative ng/mL 02/27/15 1215    Benzodiazepine Screen Negative ng/mL 02/27/15 1215    Methadone Screen Negative ng/mL 02/27/15 1215    Phencyclidine Screen Negative ng/mL 02/27/15 1215    Opiates Screen       THC Screen       Cocaine Screen       Propoxyphene Screen Negative ng/mL 02/27/15 1215    Buprenorphine Screen Negative ng/mL 02/27/15 1215    Methamphetamine Screen       Oxycodone Screen Negative ng/mL 02/27/15 1215    Tricyclic Antidepressants Screen                     Past OB History:       OB History    Para Term  AB Living   2 1 1 0 0 1   SAB TAB Ectopic Molar Multiple Live Births   0 0 0 0 0 1      # Outcome Date GA Lbr Mario/2nd Weight Sex Delivery Anes PTL Lv   2 Current            1 Term 10/2015 41w0d  3685 g (8 lb 2 oz) F Vag-Spont EPI N NISHA      Obstetric Comments   FOB #1-G1   FOB #2-G2       Past Medical History: Past Medical History:   Diagnosis Date   • Multiple gestation     CURRENTLY PREGNANT WITH TWINS   • Urinary tract infection       Past Surgical History Past Surgical History:   Procedure Laterality Date   • FOOT SURGERY Right       Family History: Family History   Problem Relation Age of Onset   •  Hypertension Maternal Grandmother    • Hypertension Mother    • Diabetes Mother       Social History:  reports that she quit smoking about 17 months ago. Her smoking use included cigarettes. She has a 4.00 pack-year smoking history. She has never used smokeless tobacco.   reports that she drank alcohol.   reports that she has current or past drug history. Drug: Marijuana.                   General ROS Negative Findings:Headaches, Visual Changes, Epigastric pain, Anorexia, Nausia/Vomiting, ROM and Vaginal Bleeding    ROS      Objective       Vital Signs Range for the last 24 hours  Temperature:     Temp Source:     BP:     Pulse:     Respirations:     SPO2:     O2 Amount (l/min):     O2 Devices     Weight:       Physical Examination:   General:   alert, appears stated age and cooperative   Skin:   normal   HEENT:     Lungs:   clear to auscultation bilaterally   Heart:   regular rate and rhythm, S1, S2 normal, no murmur, click, rub or gallop   Abdomen:  soft, non-tender; bowel sounds normal; no masses,  no organomegaly   Lower Extremeties    Pelvis:  Exam deferred.         Presentation:    Cervix: Exam by:     Dilation:     Effacement:     Station:         Fetal Heart Rate Assessment   Method:     Beats/min:     Baseline:     Varibility:     Accels:     Decels:     Tracing Category:       Uterine Assessment   Method:     Frequency (min):     Ctx Count in 10 min:     Duration:     Intensity:     Intensity by IUPC:     Resting Tone:     Resting Tone by IUPC:     [place] Units:       Laboratory Results:   Lab Results (last 24 hours)     ** No results found for the last 24 hours. **        Radiology Review:  Imaging Results (Last 24 Hours)     ** No results found for the last 24 hours. **        Other Studies:    Assessment/Plan       Twin pregnancy, dichorionic/diamniotic, unspecified trimester    Dichorionic diamniotic twin gestation        Assessment:  1.  Intrauterine DC/DA twin pregnancy at 38w1d weeks gestation  with reactive fetal status.    2.  Malpresentation      Plan:  1. Primary   2. Plan of care has been reviewed with patient.  3.  Risks, benefits of treatment plan have been discussed.  4.  All questions have been answered.  5      Mahesh Ramno MD  2020  12:15

## 2020-06-08 NOTE — ANESTHESIA POSTPROCEDURE EVALUATION
Patient: Comfort Main    Procedure Summary     Date:  20 Room / Location:  [facility] LABOR DELIVERY   [facility] LABOR DELIVERY    Anesthesia Start:  1336 Anesthesia Stop:  143    Procedure:   SECTION PRIMARY (N/A Abdomen) Diagnosis:       Twin pregnancy, dichorionic/diamniotic, unspecified trimester      (Twin pregnancy, dichorionic/diamniotic, unspecified trimester [O30.049])    Surgeon:  Mahesh Ramon MD Provider:  Alley Gibson DO    Anesthesia Type:  ITN, spinal ASA Status:  3          Anesthesia Type: ITN, spinal    Vitals  Vitals Value Taken Time   BP     Temp     Pulse     Resp     SpO2 98 % 2020  2:37 PM   Vitals shown include unvalidated device data.    1437  98.6  98%  18  98/54  89    Post Anesthesia Care and Evaluation    Patient location during evaluation: bedside  Patient participation: complete - patient participated  Level of consciousness: awake and alert  Pain management: adequate  Airway patency: patent  Anesthetic complications: No anesthetic complications    Cardiovascular status: acceptable  Respiratory status: acceptable  Hydration status: acceptable

## 2020-06-08 NOTE — PLAN OF CARE
Problem:  Delivery (Adult,Obstetrics,Pediatric)  Goal: Signs and Symptoms of Listed Potential Problems Will be Absent, Minimized or Managed ( Delivery)  Outcome: Outcome(s) achieved  Flowsheets (Taken 2020 8505)  Problems Assessed ( Delivery): anaphylactoid syndrome  Problems Present ( Delivery): none

## 2020-06-09 LAB
CYTO UR: NORMAL
HCT VFR BLD AUTO: 31.7 % (ref 34–46.6)
HGB BLD-MCNC: 9.7 G/DL (ref 12–15.9)
LAB AP CASE REPORT: NORMAL
LAB AP CLINICAL INFORMATION: NORMAL
PATH REPORT.FINAL DX SPEC: NORMAL
PATH REPORT.GROSS SPEC: NORMAL

## 2020-06-09 PROCEDURE — 85014 HEMATOCRIT: CPT | Performed by: OBSTETRICS & GYNECOLOGY

## 2020-06-09 PROCEDURE — 85018 HEMOGLOBIN: CPT | Performed by: OBSTETRICS & GYNECOLOGY

## 2020-06-09 PROCEDURE — 99231 SBSQ HOSP IP/OBS SF/LOW 25: CPT | Performed by: OBSTETRICS & GYNECOLOGY

## 2020-06-09 RX ADMIN — IBUPROFEN 600 MG: 600 TABLET, FILM COATED ORAL at 23:29

## 2020-06-09 RX ADMIN — OXYCODONE HYDROCHLORIDE AND ACETAMINOPHEN 1 TABLET: 5; 325 TABLET ORAL at 19:58

## 2020-06-09 RX ADMIN — OXYCODONE HYDROCHLORIDE AND ACETAMINOPHEN 1 TABLET: 5; 325 TABLET ORAL at 00:41

## 2020-06-09 RX ADMIN — SIMETHICONE 80 MG: 80 TABLET, CHEWABLE ORAL at 18:09

## 2020-06-09 RX ADMIN — SIMETHICONE 80 MG: 80 TABLET, CHEWABLE ORAL at 12:22

## 2020-06-09 RX ADMIN — OXYCODONE HYDROCHLORIDE AND ACETAMINOPHEN 1 TABLET: 5; 325 TABLET ORAL at 23:28

## 2020-06-09 RX ADMIN — SIMETHICONE 80 MG: 80 TABLET, CHEWABLE ORAL at 08:56

## 2020-06-09 RX ADMIN — OXYCODONE HYDROCHLORIDE AND ACETAMINOPHEN 1 TABLET: 5; 325 TABLET ORAL at 07:21

## 2020-06-09 RX ADMIN — OXYCODONE HYDROCHLORIDE AND ACETAMINOPHEN 1 TABLET: 5; 325 TABLET ORAL at 16:48

## 2020-06-09 RX ADMIN — DOCUSATE SODIUM 100 MG: 100 CAPSULE, LIQUID FILLED ORAL at 08:56

## 2020-06-09 RX ADMIN — IBUPROFEN 600 MG: 600 TABLET, FILM COATED ORAL at 06:25

## 2020-06-09 RX ADMIN — IBUPROFEN 600 MG: 600 TABLET, FILM COATED ORAL at 18:09

## 2020-06-09 RX ADMIN — IBUPROFEN 600 MG: 600 TABLET, FILM COATED ORAL at 12:22

## 2020-06-09 RX ADMIN — SIMETHICONE 80 MG: 80 TABLET, CHEWABLE ORAL at 23:29

## 2020-06-09 RX ADMIN — DOCUSATE SODIUM 100 MG: 100 CAPSULE, LIQUID FILLED ORAL at 19:57

## 2020-06-09 RX ADMIN — OXYCODONE HYDROCHLORIDE AND ACETAMINOPHEN 1 TABLET: 5; 325 TABLET ORAL at 12:22

## 2020-06-09 NOTE — ANESTHESIA POSTPROCEDURE EVALUATION
Patient: Comfort Main    Procedure Summary     Date:  20 Room / Location:  [facility] LABOR DELIVERY   [facility] LABOR DELIVERY    Anesthesia Start:  1336 Anesthesia Stop:      Procedure:   SECTION PRIMARY (N/A Abdomen) Diagnosis:       Twin pregnancy, dichorionic/diamniotic, unspecified trimester      (Twin pregnancy, dichorionic/diamniotic, unspecified trimester [O30.049])    Surgeon:  Mahesh Ramon MD Provider:  Alley Gibson DO    Anesthesia Type:  ITN, spinal ASA Status:  3          Anesthesia Type: ITN, spinal    Vitals  Vitals Value Taken Time   /72 2020 12:00 PM   Temp 98.4 °F (36.9 °C) 2020 12:00 PM   Pulse 79 2020 12:00 PM   Resp 16 2020 12:00 PM   SpO2 98 % 2020  4:31 PM   Vitals shown include unvalidated device data.        Post Anesthesia Care and Evaluation    Patient location during evaluation: bedside  Patient participation: complete - patient participated  Level of consciousness: awake and alert  Pain management: adequate  Airway patency: patent  Anesthetic complications: No anesthetic complications    Cardiovascular status: acceptable  Respiratory status: acceptable  Hydration status: acceptable  Post Neuraxial Block status: Motor and sensory function returned to baseline and No signs or symptoms of PDPH

## 2020-06-09 NOTE — PROGRESS NOTES
[facility] [place]    2020    Name:Comfort Main    MR#:[id number]     PROGRESS NOTE:  Post-Op Day 1 S/P    HD:1    Subjective   29 y.o. yo Female  s/p CS at 38w1d doing well. Pain well controlled. Tolerating regular diet and having flatus. Lochia normal.     Patient Active Problem List   Diagnosis   • 37 weeks gestation of pregnancy   • Twin pregnancy, dichorionic/diamniotic, unspecified trimester   • Obesity (BMI 30-39.9)   • Pregnancy   • Dichorionic diamniotic twin gestation        Objective    Vitals  Temp:  Temp:  [97.4 °F (36.3 °C)-98.8 °F (37.1 °C)] 98 °F (36.7 °C)  Temp src: Oral  BP:  BP: ()/(50-83) 114/70  Pulse:  Heart Rate:  [49-88] 74  RR:   Resp:  [16-20] 20    General Awake, alert, no distress  Abdomen Soft, nondistended, fundus firm, is below umbilicus, appropriately tender  Incision  Intact, no erythema or exudate  Extremities Calves NT bilaterally     I/O last 3 completed shifts:  In: 1200 [I.V.:1200]  Out:  [Urine:1750; Blood:260]    LABS:   Lab Results   Component Value Date    WBC 7.51 2020    HGB 9.7 (L) 2020    HCT 31.7 (L) 2020    MCV 90.2 2020     2020       Infant:      Luís MainJake KENY [[id number]]   female     Luís MainJake ERNESTINE [[id number]]   female        Assessment   1.  POD #1 Primary  for twins with malpresentation    Plan: Doing well.  Routine postoperative care      Active Problems:   None      Mahesh Ramon MD  2020 11:51

## 2020-06-10 VITALS
SYSTOLIC BLOOD PRESSURE: 115 MMHG | TEMPERATURE: 98.5 F | RESPIRATION RATE: 16 BRPM | HEIGHT: 68 IN | OXYGEN SATURATION: 94 % | HEART RATE: 78 BPM | BODY MASS INDEX: 39.71 KG/M2 | WEIGHT: 262 LBS | DIASTOLIC BLOOD PRESSURE: 67 MMHG

## 2020-06-10 PROCEDURE — 99238 HOSP IP/OBS DSCHRG MGMT 30/<: CPT | Performed by: OBSTETRICS & GYNECOLOGY

## 2020-06-10 RX ORDER — DOCUSATE SODIUM 100 MG/1
100 CAPSULE, LIQUID FILLED ORAL 2 TIMES DAILY
Qty: 60 CAPSULE | Refills: 1 | Status: SHIPPED | OUTPATIENT
Start: 2020-06-10 | End: 2021-08-12

## 2020-06-10 RX ORDER — FERROUS SULFATE 325(65) MG
325 TABLET ORAL
Qty: 60 TABLET | Refills: 10 | Status: SHIPPED | OUTPATIENT
Start: 2020-06-10 | End: 2021-08-12

## 2020-06-10 RX ORDER — IBUPROFEN 600 MG/1
600 TABLET ORAL EVERY 6 HOURS PRN
Qty: 40 TABLET | Refills: 1 | Status: SHIPPED | OUTPATIENT
Start: 2020-06-10 | End: 2021-08-12

## 2020-06-10 RX ORDER — OXYCODONE HYDROCHLORIDE AND ACETAMINOPHEN 5; 325 MG/1; MG/1
1-2 TABLET ORAL EVERY 4 HOURS PRN
Qty: 20 TABLET | Refills: 0 | Status: SHIPPED | OUTPATIENT
Start: 2020-06-10 | End: 2020-06-18

## 2020-06-10 RX ADMIN — OXYCODONE HYDROCHLORIDE AND ACETAMINOPHEN 1 TABLET: 10; 325 TABLET ORAL at 05:40

## 2020-06-10 RX ADMIN — SIMETHICONE 80 MG: 80 TABLET, CHEWABLE ORAL at 13:02

## 2020-06-10 RX ADMIN — IBUPROFEN 600 MG: 600 TABLET, FILM COATED ORAL at 13:02

## 2020-06-10 RX ADMIN — OXYCODONE HYDROCHLORIDE AND ACETAMINOPHEN 1 TABLET: 10; 325 TABLET ORAL at 10:35

## 2020-06-10 RX ADMIN — IBUPROFEN 600 MG: 600 TABLET, FILM COATED ORAL at 05:40

## 2020-06-10 RX ADMIN — OXYCODONE HYDROCHLORIDE AND ACETAMINOPHEN 1 TABLET: 5; 325 TABLET ORAL at 01:20

## 2020-06-10 NOTE — DISCHARGE SUMMARY
Discharge Summary    Date of Admission: 2020  Date of Discharge:  6/10/2020      Patient: Comfort Main      MR#:[id number]    Primary Surgeon/OB: Mahesh Ramon MD    Discharge Surgeon/OB: Mahesh Ramon MD    Presenting Problem/History of Present Illness  Twin pregnancy, dichorionic/diamniotic, unspecified trimester [O30.049]  Dichorionic diamniotic twin gestation [O30.049]     Patient Active Problem List    Diagnosis   • *Twin pregnancy, dichorionic/diamniotic, unspecified trimester [O30.049]   • Dichorionic diamniotic twin gestation [O30.049]   • Pregnancy [Z34.90]   • 37 weeks gestation of pregnancy [Z3A.37]   • Obesity (BMI 30-39.9) [E66.9]         Discharge Diagnosis:  section at 38w1d    Procedures:     Luís MainJake A [[id number]]   , Low Transverse     Jose David Bettina B [[id number]]   , Low Transverse         Luís MainJake KENY [[id number]]   2020     Luís MainJake B [[id number]]   2020        Luís MainJake KENY [[id number]]   2:08 PM     Luís MainJake B [[id number]]   2:09 PM         Rh Immune globulin given: not applicable    Rubella vaccine given: no    Discharge Date: 6/10/2020; Discharge Time: 11:00    Hospital Course  Patient is a 29 y.o. female  at 38w1d status post  section with uneventful postoperative recovery.  Patient was advanced to regular diet on postoperative day#1.  On discharge, ambulating, tolerating a regular diet without any difficulties and her incision is dry, clean and intact.     Infant:        Luís MainJake KENY [[id number]]   female     Jose David Bettina SINHA [[id number]]   female   fetus      Jose David Bettina A [[id number]]   2658 g (5 lb 13.8 oz)     Bettina Main B [[id number]]   2769 g (6 lb 1.7 oz)   with Apgar scores of      Bettina Main [[id number]]   8      Bettina Main [[id number]]   6   ,       Bettina Main A [[id number]]   9      Bettina Main B [[id number]]   7    at five minutes.    Condition on Discharge:  Stable    Vital Signs  Temp:  [98 °F (36.7 °C)-98.8 °F (37.1 °C)] 98.5 °F (36.9 °C)  Heart Rate:  [52-92] 78  Resp:  [16] 16  BP: (112-133)/(61-82) 115/67    Lab Results   Component Value Date    WBC 7.51 06/05/2020    HGB 9.7 (L) 06/09/2020    HCT 31.7 (L) 06/09/2020    MCV 90.2 06/05/2020     06/05/2020       Discharge Disposition  Home or Self Care    Discharge Medications     Discharge Medications      New Medications      Instructions Start Date   ibuprofen 600 MG tablet  Commonly known as:  ADVIL,MOTRIN   600 mg, Oral, Every 6 Hours PRN      oxyCODONE-acetaminophen 5-325 MG per tablet  Commonly known as:  PERCOCET   1-2 tablets, Oral, Every 4 Hours PRN         Changes to Medications      Instructions Start Date   ferrous sulfate 325 (65 FE) MG tablet  What changed:  when to take this   325 mg, Oral, 2 Times Daily Before Meals         Continue These Medications      Instructions Start Date   docusate sodium 100 MG capsule  Commonly known as:  Colace   100 mg, Oral, 2 Times Daily      Prenatal 27-1 27-1 MG tablet tablet   1 tablet, Oral, Daily         Stop These Medications    hydrocortisone 2.5 % rectal cream  Commonly known as:  Anusol-HC     promethazine 25 MG tablet  Commonly known as:  PHENERGAN     Promethegan 25 MG suppository  Generic drug:  promethazine            Discharge Diet: Regular    Activity at Discharge: Routine post op and postpartum instructions given    Follow-up Appointments  Future Appointments   Date Time Provider Department Center   6/22/2020 10:00 AM Mahesh Ramon MD [facility] None         Mahesh Ramon MD  06/10/20  11:00

## 2020-06-11 NOTE — PAYOR COMM NOTE
"Comfort Hawk (29 y.o. Female)   Discharge Date     Date of Birth Social Security Number Address Home Phone MRN    1990  [address] [place] [id number] [phone] [id number]    [employer] Marital Status          Orthodox Single       Admission Date Admission Type Admitting Provider Attending Provider Department, Room/Bed    6/8/20 Elective Mahesh Ramon MD  [facility] MOTHER BABY 4A, N420/1    Discharge Date Discharge Disposition Discharge Destination        6/10/2020 Home or Self Care              Attending Provider:  (none)   Allergies:  No Known Allergies    Isolation:  None   Infection:  None   Code Status:  Prior    Ht:  171.5 cm (67.5\")   Wt:  119 kg (262 lb)    Admission Cmt:  None   Principal Problem:  Twin pregnancy, dichorionic/diamniotic, unspecified trimester [O30.049]                 Active Insurance as of 6/8/2020     Primary Coverage     Payor Plan Insurance Group Employer/Plan Group    ANTHEM MEDICAID ANTH MEDICAID [id number]     Payor Plan Address Payor Plan Phone Number Payor Plan Fax Number Effective Dates    [address] [address] [phone]  3/1/2019 - None Entered    [facility] [address]       Subscriber Name Subscriber Birth Date Member ID       COMFORT HAWK 1990 [id number]                 Emergency Contacts      (Rel.) Home Phone Work Phone Mobile Phone    LACHELLE GLOVER (Mother) [phone] -- --    Trenton Gill (Significant Other) -- -- [phone]                 Discharge Summary      Mahesh Ramon MD at 06/10/20 1100          Discharge Summary    Date of Admission: 6/8/2020  Date of Discharge:  6/10/2020      Patient: Comfort Hawk      MR#:[id number]    Primary Surgeon/OB: Mahesh Ramon MD    Discharge Surgeon/OB: Mahesh Ramon MD    Presenting Problem/History of Present Illness  Twin pregnancy, dichorionic/diamniotic, unspecified trimester " [O30.049]  Dichorionic diamniotic twin gestation [O30.049]     Patient Active Problem List    Diagnosis   • *Twin pregnancy, dichorionic/diamniotic, unspecified trimester [O30.049]   • Dichorionic diamniotic twin gestation [O30.049]   • Pregnancy [Z34.90]   • 37 weeks gestation of pregnancy [Z3A.37]   • Obesity (BMI 30-39.9) [E66.9]         Discharge Diagnosis:  section at 38w1d    Procedures:     Luís MainJake A [[id number]]   , Low Transverse     Luís MainJake B [[id number]]   , Low Transverse         Luís MainJake A [[id number]]   2020     Luís MainJake B [[id number]]   2020        Luís MainJake A [[id number]]   2:08 PM     Jose David LuísJake B [[id number]]   2:09 PM         Rh Immune globulin given: not applicable    Rubella vaccine given: no    Discharge Date: 6/10/2020; Discharge Time: 11:00    Hospital Course  Patient is a 29 y.o. female  at 38w1d status post  section with uneventful postoperative recovery.  Patient was advanced to regular diet on postoperative day#1.  On discharge, ambulating, tolerating a regular diet without any difficulties and her incision is dry, clean and intact.     Infant:        Bettina Main [[id number]]   female     Luís MainheidiasRobert B [[id number]]   female   fetus      Jose David Bettina A [[id number]]   2658 g (5 lb 13.8 oz)     Tre Mainrl B [[id number]]   2769 g (6 lb 1.7 oz)   with Apgar scores of      Bettina Main A [[id number]]   8      Bettina Main B [[id number]]   6   ,      Rose Mainl A [[id number]]   9      Jose DavidLuísFarzadl B [[id number]]   7    at five minutes.    Condition on Discharge:  Stable    Vital Signs  Temp:  [98 °F (36.7 °C)-98.8 °F (37.1 °C)] 98.5 °F (36.9 °C)  Heart Rate:  [52-92] 78  Resp:  [16] 16  BP: (112-133)/(61-82) 115/67    Lab Results   Component Value Date    WBC 7.51  06/05/2020    HGB 9.7 (L) 06/09/2020    HCT 31.7 (L) 06/09/2020    MCV 90.2 06/05/2020     06/05/2020       Discharge Disposition  Home or Self Care    Discharge Medications     Discharge Medications      New Medications      Instructions Start Date   ibuprofen 600 MG tablet  Commonly known as:  ADVIL,MOTRIN   600 mg, Oral, Every 6 Hours PRN      oxyCODONE-acetaminophen 5-325 MG per tablet  Commonly known as:  PERCOCET   1-2 tablets, Oral, Every 4 Hours PRN         Changes to Medications      Instructions Start Date   ferrous sulfate 325 (65 FE) MG tablet  What changed:  when to take this   325 mg, Oral, 2 Times Daily Before Meals         Continue These Medications      Instructions Start Date   docusate sodium 100 MG capsule  Commonly known as:  Colace   100 mg, Oral, 2 Times Daily      Prenatal 27-1 27-1 MG tablet tablet   1 tablet, Oral, Daily         Stop These Medications    hydrocortisone 2.5 % rectal cream  Commonly known as:  Anusol-HC     promethazine 25 MG tablet  Commonly known as:  PHENERGAN     Promethegan 25 MG suppository  Generic drug:  promethazine            Discharge Diet: Regular    Activity at Discharge: Routine post op and postpartum instructions given    Follow-up Appointments  Future Appointments   Date Time Provider Department Center   6/22/2020 10:00 AM Mahesh Ramon MD [facility] None         Mahesh SINHA. MD [place]  06/10/20  11:00          Electronically signed by Mahesh Ramon MD at 06/10/20 8535

## 2020-07-01 ENCOUNTER — TELEPHONE (OUTPATIENT)
Dept: OBSTETRICS AND GYNECOLOGY | Facility: CLINIC | Age: 30
End: 2020-07-01

## 2020-07-22 ENCOUNTER — TELEPHONE (OUTPATIENT)
Dept: OBSTETRICS AND GYNECOLOGY | Facility: CLINIC | Age: 30
End: 2020-07-22

## 2020-07-23 ENCOUNTER — TELEPHONE (OUTPATIENT)
Dept: OBSTETRICS AND GYNECOLOGY | Facility: CLINIC | Age: 30
End: 2020-07-23

## 2020-10-07 ENCOUNTER — APPOINTMENT (OUTPATIENT)
Dept: GENERAL RADIOLOGY | Facility: HOSPITAL | Age: 30
End: 2020-10-07

## 2020-10-07 ENCOUNTER — HOSPITAL ENCOUNTER (EMERGENCY)
Facility: HOSPITAL | Age: 30
Discharge: HOME | End: 2020-10-07
Attending: EMERGENCY MEDICINE | Admitting: EMERGENCY MEDICINE

## 2020-10-07 VITALS
HEART RATE: 64 BPM | HEIGHT: 69 IN | BODY MASS INDEX: 36.29 KG/M2 | WEIGHT: 245 LBS | OXYGEN SATURATION: 99 % | RESPIRATION RATE: 18 BRPM | DIASTOLIC BLOOD PRESSURE: 122 MMHG | SYSTOLIC BLOOD PRESSURE: 141 MMHG | TEMPERATURE: 98.7 F

## 2020-10-07 DIAGNOSIS — M54.50 CHRONIC MIDLINE LOW BACK PAIN WITHOUT SCIATICA: Primary | ICD-10-CM

## 2020-10-07 DIAGNOSIS — G89.29 CHRONIC MIDLINE LOW BACK PAIN WITHOUT SCIATICA: Primary | ICD-10-CM

## 2020-10-07 PROCEDURE — 25010000002 KETOROLAC TROMETHAMINE PER 15 MG: Performed by: PHYSICIAN ASSISTANT

## 2020-10-07 PROCEDURE — 96372 THER/PROPH/DIAG INJ SC/IM: CPT

## 2020-10-07 PROCEDURE — 99283 EMERGENCY DEPT VISIT LOW MDM: CPT

## 2020-10-07 PROCEDURE — 72100 X-RAY EXAM L-S SPINE 2/3 VWS: CPT

## 2020-10-07 RX ORDER — KETOROLAC TROMETHAMINE 30 MG/ML
60 INJECTION, SOLUTION INTRAMUSCULAR; INTRAVENOUS ONCE
Status: COMPLETED | OUTPATIENT
Start: 2020-10-07 | End: 2020-10-07

## 2020-10-07 RX ORDER — ETODOLAC 200 MG/1
200 CAPSULE ORAL EVERY 8 HOURS
Qty: 15 CAPSULE | Refills: 0 | Status: SHIPPED | OUTPATIENT
Start: 2020-10-07 | End: 2021-08-12

## 2020-10-07 RX ORDER — CYCLOBENZAPRINE HCL 10 MG
10 TABLET ORAL 3 TIMES DAILY PRN
Qty: 21 TABLET | Refills: 0 | Status: SHIPPED | OUTPATIENT
Start: 2020-10-07 | End: 2021-08-12

## 2020-10-07 RX ADMIN — KETOROLAC TROMETHAMINE 60 MG: 60 INJECTION, SOLUTION INTRAMUSCULAR at 10:16

## 2020-10-07 NOTE — ED PROVIDER NOTES
Subjective   This patient states for 3 months since having a  section for twins she has had progressively worsening low back pain.  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  No radicular symptoms.  No bowel bladder incontinence or saddle paresthesias.  The pain is in her lumbar area.  The pain is worse with range of motion.  She states her mother had a Percocet pain pill left over and she took it but it did not help and only made her sleepy.  She denies urinary symptoms.  No abdominal pain.          Review of Systems   Constitutional: Negative.    HENT: Negative.    Eyes: Negative.    Respiratory: Negative.    Cardiovascular: Negative.    Gastrointestinal: Negative.    Genitourinary: Negative.    Musculoskeletal: Positive for back pain.   Skin: Negative.    Neurological: Negative.    Psychiatric/Behavioral: Negative.        Past Medical History:   Diagnosis Date   • Multiple gestation     CURRENTLY PREGNANT WITH TWINS   • Urinary tract infection        No Known Allergies    Past Surgical History:   Procedure Laterality Date   •  SECTION N/A 2020    Procedure:  SECTION PRIMARY;  Surgeon: Mahesh Ramon MD;  Location: [facility] LABOR DELIVERY;  Service: Obstetrics/Gynecology;  Laterality: N/A;   • FOOT SURGERY Right        Family History   Problem Relation Age of Onset   • Hypertension Maternal Grandmother    • Diabetes Maternal Grandmother    • Hypertension Mother        Social History     Socioeconomic History   • Marital status: Single     Spouse name: Not on file   • Number of children: Not on file   • Years of education: Not on file   • Highest education level: Not on file   Tobacco Use   • Smoking status: Current Every Day Smoker     Packs/day: 0.50     Years: 8.00     Pack years: 4.00     Types: Cigarettes   • Smokeless tobacco: Never Used   Substance and Sexual Activity   • Alcohol use: Not  Currently     Comment: SOC   • Drug use: Not Currently     Types: Marijuana     Comment: quit DUE TO PREG   • Sexual activity: Defer     Partners: Female           Objective   Physical Exam  Vitals signs and nursing note reviewed.   Constitutional:       Appearance: Normal appearance. She is obese.   HENT:      Head: Normocephalic and atraumatic.      Nose: Nose normal.   Eyes:      Extraocular Movements: Extraocular movements intact.   Neck:      Musculoskeletal: Normal range of motion.   Cardiovascular:      Rate and Rhythm: Normal rate.   Pulmonary:      Effort: Pulmonary effort is normal.   Musculoskeletal: Normal range of motion.   Skin:     General: Skin is warm and dry.   Neurological:      General: No focal deficit present.      Mental Status: She is alert.      GCS: GCS eye subscore is 4. GCS verbal subscore is 5. GCS motor subscore is 6.      Motor: Motor function is intact. No weakness.      Comments: 5 out of 5 strength in bilateral lower extremities and plantar dorsiflexion against resistance.  Positive straight leg raise bilaterally.  2+ patellar reflexes bilaterally, 2+ Achilles reflexes bilaterally.   Psychiatric:         Mood and Affect: Mood normal.         Behavior: Behavior normal.         Procedures           ED Course                                           [facility]  1126  This patient currently rates her pain a 7 out of 10.  She states this is her baseline where she is been for 3 months.  She has had some improvement after Toradol.  Plain films per radiology read is normal.  Will treat with NSAIDs and muscle relaxers and have follow-up with Dr. Nelson.  Final diagnoses:   Chronic midline low back pain without sciatica            Michael Arrington PA-C  10/07/20 1128

## 2021-08-12 ENCOUNTER — INITIAL PRENATAL (OUTPATIENT)
Dept: OBSTETRICS AND GYNECOLOGY | Facility: CLINIC | Age: 31
End: 2021-08-12

## 2021-08-12 VITALS — SYSTOLIC BLOOD PRESSURE: 114 MMHG | BODY MASS INDEX: 36.77 KG/M2 | WEIGHT: 249 LBS | DIASTOLIC BLOOD PRESSURE: 72 MMHG

## 2021-08-12 DIAGNOSIS — O36.80X0 ENCOUNTER TO DETERMINE FETAL VIABILITY OF PREGNANCY, SINGLE OR UNSPECIFIED FETUS: ICD-10-CM

## 2021-08-12 DIAGNOSIS — Z34.82 ENCOUNTER FOR SUPERVISION OF OTHER NORMAL PREGNANCY IN SECOND TRIMESTER: Primary | ICD-10-CM

## 2021-08-12 PROBLEM — Z3A.37 37 WEEKS GESTATION OF PREGNANCY: Status: RESOLVED | Noted: 2019-11-19 | Resolved: 2021-08-12

## 2021-08-12 PROBLEM — Z34.90 PREGNANCY: Status: RESOLVED | Noted: 2020-03-03 | Resolved: 2021-08-12

## 2021-08-12 PROBLEM — O30.049 DICHORIONIC DIAMNIOTIC TWIN GESTATION: Status: RESOLVED | Noted: 2020-05-05 | Resolved: 2021-08-12

## 2021-08-12 PROBLEM — O30.049 TWIN PREGNANCY, DICHORIONIC/DIAMNIOTIC, UNSPECIFIED TRIMESTER: Status: RESOLVED | Noted: 2019-11-19 | Resolved: 2021-08-12

## 2021-08-12 LAB
C TRACH RRNA SPEC DONR QL NAA+PROBE: NEGATIVE
N GONORRHOEA DNA SPEC QL NAA+PROBE: NEGATIVE

## 2021-08-12 PROCEDURE — 99213 OFFICE O/P EST LOW 20 MIN: CPT | Performed by: MIDWIFE

## 2021-08-12 NOTE — PROGRESS NOTES
Subjective     Chief Complaint   Patient presents with   • Initial Prenatal Visit     LMP 21, TVS done today 14w5d, last pap 1 year. C/O vaginal odor        Comfort Main is a 30 y.o. .  Patient's last menstrual period was 2021..  She presents to be seen to initiate prenatal care with our practice. She has had 2 previous uncomplicated pregnancies. She had  with first and then primary CSection with second for twins. She has noticed a vaginal odor and states she gets BV when pregnant. Her partner is with her today.    The following portions of the patient's history were reviewed and updated as appropriate:vital signs, allergies, current medications, past medical history, past social history, past surgical history and problem list.    Review of Systems -   /72   Wt 113 kg (249 lb)   LMP 2021   BMI 36.77 kg/m²    Gastrointestinal: denies nausea and vomiting, denies constipation  Genitourinary: denies frequency, urgency, or burning with urination  All other systems were reviewed and are negative    Objective     Physical Exam  Constitutional   The patient is awake, alert, well developed, well nourished and well groomed.   Neck   The neck is supple and the trachea is midline. The thyroid is not enlarged and there are no palpable nodules.   Respiratory  The patient is relaxed and breathes without effort.   Lungs CTAB  Cardiovascular  Normal rate and rhythm is regular without murmur -  Negative LE pitting edema  Gastrointestinal   The abdomen is gravid - soft - non tender.  No umbilical hernia  Genitourinary   - External Genitalia without erythema, lesions, or masses  -Vagina - There is no abnormal vaginal discharge.   -Cervix is without cervical motion tenderness   Uterus - S=D  Musculoskeletal  Normal gait, no joint pain or swelling  Extremities  Full ROM. No cyanosis or edema  Psychiatric  The patient is oriented to person, place, and time. Maintains eye contact      Imaging   Pelvic ultrasound report  14w5d, + FHT    Assessment/Plan     ASSESSMENT  1. IUP at 14w5d  2.   Normal pregnancy  3.   Late entry to care  4.   Hx of CSection    PLAN  1. Tests ordered today (option for genetic screening info given if appropriate):   Orders Placed This Encounter   Procedures   • US Ob Limited 1 + Fetuses     Order Specific Question:   Reason for Exam:     Answer:   NOB, dates, viability   • OB Panel With HIV     Order Specific Question:   Release to patient     Answer:   Immediate   • TSH     Order Specific Question:   Release to patient     Answer:   Immediate     2. Medications prescribed today:  No orders of the defined types were placed in this encounter.    3. Information reviewed:smoking in pregnancy, exercise in pregnancy, nutrition in pregnancy, weight gain in pregnancy, work and travel restrictions during pregnancy, list of OTC medications acceptable in pregnancy and call coverage groups    Follow up: 4 week(s)         This note was electronically signed.    Suzy Lebron CNM  August 12, 2021

## 2021-08-13 LAB
ABO GROUP BLD: NORMAL
BASOPHILS # BLD AUTO: 0 X10E3/UL (ref 0–0.2)
BASOPHILS NFR BLD AUTO: 0 %
BLD GP AB SCN SERPL QL: NEGATIVE
EOSINOPHIL # BLD AUTO: 0.1 X10E3/UL (ref 0–0.4)
EOSINOPHIL NFR BLD AUTO: 1 %
ERYTHROCYTE [DISTWIDTH] IN BLOOD BY AUTOMATED COUNT: 13.7 % (ref 11.7–15.4)
HBV SURFACE AG SERPL QL IA: NEGATIVE
HCT VFR BLD AUTO: 37.1 % (ref 34–46.6)
HCV AB S/CO SERPL IA: <0.1 S/CO RATIO (ref 0–0.9)
HGB BLD-MCNC: 12.7 G/DL (ref 11.1–15.9)
HIV 1+2 AB+HIV1 P24 AG SERPL QL IA: NON REACTIVE
IMM GRANULOCYTES # BLD AUTO: 0 X10E3/UL (ref 0–0.1)
IMM GRANULOCYTES NFR BLD AUTO: 0 %
LYMPHOCYTES # BLD AUTO: 1.7 X10E3/UL (ref 0.7–3.1)
LYMPHOCYTES NFR BLD AUTO: 22 %
MCH RBC QN AUTO: 30 PG (ref 26.6–33)
MCHC RBC AUTO-ENTMCNC: 34.2 G/DL (ref 31.5–35.7)
MCV RBC AUTO: 88 FL (ref 79–97)
MONOCYTES # BLD AUTO: 0.5 X10E3/UL (ref 0.1–0.9)
MONOCYTES NFR BLD AUTO: 6 %
NEUTROPHILS # BLD AUTO: 5.2 X10E3/UL (ref 1.4–7)
NEUTROPHILS NFR BLD AUTO: 71 %
PLATELET # BLD AUTO: 363 X10E3/UL (ref 150–450)
RBC # BLD AUTO: 4.24 X10E6/UL (ref 3.77–5.28)
RH BLD: POSITIVE
RPR SER QL: NON REACTIVE
RUBV IGG SERPL IA-ACNC: 4.45 INDEX
TSH SERPL DL<=0.005 MIU/L-ACNC: 1.95 UIU/ML (ref 0.27–4.2)
WBC # BLD AUTO: 7.5 X10E3/UL (ref 3.4–10.8)

## 2021-08-18 ENCOUNTER — TELEPHONE (OUTPATIENT)
Dept: OBSTETRICS AND GYNECOLOGY | Facility: CLINIC | Age: 31
End: 2021-08-18

## 2021-08-18 RX ORDER — METRONIDAZOLE 500 MG/1
500 TABLET ORAL 2 TIMES DAILY
Qty: 14 TABLET | Refills: 0 | Status: SHIPPED | OUTPATIENT
Start: 2021-08-18 | End: 2021-08-25

## 2021-08-26 DIAGNOSIS — Z34.82 ENCOUNTER FOR SUPERVISION OF OTHER NORMAL PREGNANCY IN SECOND TRIMESTER: ICD-10-CM

## 2021-09-14 ENCOUNTER — ROUTINE PRENATAL (OUTPATIENT)
Dept: OBSTETRICS AND GYNECOLOGY | Facility: CLINIC | Age: 31
End: 2021-09-14

## 2021-09-14 VITALS — WEIGHT: 255 LBS | DIASTOLIC BLOOD PRESSURE: 74 MMHG | BODY MASS INDEX: 37.66 KG/M2 | SYSTOLIC BLOOD PRESSURE: 116 MMHG

## 2021-09-14 DIAGNOSIS — Z34.82 ENCOUNTER FOR SUPERVISION OF OTHER NORMAL PREGNANCY IN SECOND TRIMESTER: Primary | ICD-10-CM

## 2021-09-14 DIAGNOSIS — O09.892 SHORT INTERVAL BETWEEN PREGNANCIES AFFECTING PREGNANCY IN SECOND TRIMESTER, ANTEPARTUM: ICD-10-CM

## 2021-09-14 PROCEDURE — 99214 OFFICE O/P EST MOD 30 MIN: CPT | Performed by: OBSTETRICS & GYNECOLOGY

## 2021-09-14 NOTE — PROGRESS NOTES
Chief Complaint  Routine Prenatal Visit (Growth scan, pt states she is well.)    History of Present Illness:  Comfort is a  currently at 19w3d who presents today with no complaints.  Patient does report positive fetal movement.  Patient declines any genetic screening today.  Patient did have labs last visit as noted.    Exam:  Vitals:  See prenatal flowsheet as noted and reviewed  General: Alert, cooperative, and does not appear in any distress  Abdomen:   See prenatal flowsheet as noted and reviewed    Uterus gravid, non-tender; no palpable masses    No guarding or rebound tenderness  Pelvic:  See prenatal flowsheet as noted and reviewed  Ext:  See prenatal flowsheet as noted and reviewed    Moves extremities well, no cyanosis and no redness  Urine:  See prenatal flowsheet as noted and reviewed    Data Review:  The following data was reviewed by: Tatyana Cornejo MD on 2021:  Prenatal Labs:  Lab Results   Component Value Date    HGB 12.7 2021    RUBELLAIGGIN Immune 2015    RUBELLAABIGG 4.45 2021    HEPBSAG Negative 2021    LABRPR Non-reactive 2015    ABORH O Rh Positive 10/18/2015    ABO O 2021    RH Positive 2021    ABSCRN Negative 2021    LABANTI Negative 2015    JYNWOLS88 NonReactive 2015    RGF8XVO7 Non Reactive 2021    HEPCVIRUSABY <0.1 2021    URINECX No growth 2019       No visits with results within 1 Month(s) from this visit.   Latest known visit with results is:   Initial Prenatal on 2021   Component Date Value   • Hepatitis B Surface Ag 2021 Negative    • Hep C Virus Ab 2021 <0.1    • RPR 2021 Non Reactive    • Rubella Antibodies, IgG 2021 4.45    • ABO Type 2021 O    • Rh Factor 2021 Positive    • Antibody Screen 2021 Negative    • HIV Screen 4th Gen w/RFX* 2021 Non Reactive    • WBC 2021 7.5    • RBC 2021 4.24    • Hemoglobin 2021 12.7    •  Hematocrit 2021 37.1    • MCV 2021 88    • MCH 2021 30.0    • MCHC 2021 34.2    • RDW 2021 13.7    • Platelets 2021 363    • Neutrophil Rel % 2021 71    • Lymphocyte Rel % 2021 22    • Monocyte Rel % 2021 6    • Eosinophil Rel % 2021 1    • Basophil Rel % 2021 0    • Neutrophils Absolute 2021 5.2    • Lymphocytes Absolute 2021 1.7    • Monocytes Absolute 2021 0.5    • Eosinophils Absolute 2021 0.1    • Basophils Absolute 2021 0.0    • Immature Granulocyte Rel* 2021 0    • Immature Grans Absolute 2021 0.0    • TSH 2021 1.950    • Chlamydia trachomatis, N* 2021 negative    • Neisseria gonorrhoeae, N* 2021 negative      Imaging:  US Ob 14 + Weeks Single or First Gestation  Comfort Main  : 1990  MRN: [id number]  Date: 2021    Reason for exam/History:  Anatomic Survey    Ultrasound images are reviewed.  There is noted to be a viable   intrauterine pregnancy.   The pregnancy is measuring 19 weeks 1 days   gestation.  The fetal heart rate was normal.  Normal anatomy was noted.   The placental location was noted to be posterior.  The amniotic fluid was   normal.    The exam limitations noted:  none    See the official report for actual measurements and structures seen.    Tatyana Cornejo MD, [facility]  OB GYN [place]    Medical Records:  None    Assessment and Plan:  Problem List Items Addressed This Visit     None      Visit Diagnoses     Encounter for supervision of other normal pregnancy in second trimester    -  Primary  Topics discussed:     ab precautions  genetic screening - Today we discussed genetic testing.  She is aware that the MSAFP-4 and NIPT - [id number] is a screening test.  A screening test is not a diagnostic test.  This means that a negative test does not guarantee an unaffected fetus and a positive test does not mean the fetus has  the condition for which the test is being performed.  If the test returns positive, a diagnostic test should be consider to determine if the fetus in fact has the condition.  After considering the options previously presented, she is not interested in having genetic testing performed.  kick counts and fetal movement  PIH precautions  Anatomic scan today as noted.  Patient has been informed regarding those findings.  Patient declines any screening as noted.  The patient's previous labs have been reviewed with the patient as noted.    Short interval between pregnancies affecting pregnancy in second trimester, antepartum      Patient with short interval pregnancy is noted.  Patient with previous gestation with twin pregnancy.        Follow Up/Instructions:  Follow up as scheduled.  Patient was given instructions and counseling regarding her condition or for health maintenance advice. Please see specific information pulled into the AVS if appropriate.     Note: Speech recognition transcription software may have been used to dictate portions of this document.  An attempt at proofreading has been made though minor errors in transcription may still be present.    This note was electronically signed.  Tatyana Cornejo M.D.

## 2021-12-30 ENCOUNTER — ROUTINE PRENATAL (OUTPATIENT)
Dept: OBSTETRICS AND GYNECOLOGY | Facility: CLINIC | Age: 31
End: 2021-12-30

## 2021-12-30 VITALS — BODY MASS INDEX: 41.5 KG/M2 | WEIGHT: 281 LBS | SYSTOLIC BLOOD PRESSURE: 120 MMHG | DIASTOLIC BLOOD PRESSURE: 70 MMHG

## 2021-12-30 DIAGNOSIS — Z34.93 THIRD TRIMESTER PREGNANCY: Primary | ICD-10-CM

## 2021-12-30 DIAGNOSIS — O99.810 GLUCOSE INTOLERANCE OF PREGNANCY: ICD-10-CM

## 2021-12-30 DIAGNOSIS — Z36.89 ENCOUNTER FOR ULTRASOUND TO ASSESS FETAL GROWTH: ICD-10-CM

## 2021-12-30 PROBLEM — Z34.90 PREGNANCY: Status: ACTIVE | Noted: 2021-12-30

## 2021-12-30 PROCEDURE — 99213 OFFICE O/P EST LOW 20 MIN: CPT | Performed by: OBSTETRICS & GYNECOLOGY

## 2021-12-30 RX ORDER — SODIUM CHLORIDE 0.9 % (FLUSH) 0.9 %
10 SYRINGE (ML) INJECTION EVERY 12 HOURS SCHEDULED
Status: CANCELLED | OUTPATIENT
Start: 2021-12-30

## 2021-12-30 RX ORDER — CARBOPROST TROMETHAMINE 250 UG/ML
250 INJECTION, SOLUTION INTRAMUSCULAR AS NEEDED
Status: CANCELLED | OUTPATIENT
Start: 2021-12-30

## 2021-12-30 RX ORDER — METHYLERGONOVINE MALEATE 0.2 MG/ML
200 INJECTION INTRAVENOUS ONCE AS NEEDED
Status: CANCELLED | OUTPATIENT
Start: 2021-12-30

## 2021-12-30 RX ORDER — SODIUM CHLORIDE 0.9 % (FLUSH) 0.9 %
10 SYRINGE (ML) INJECTION AS NEEDED
Status: CANCELLED | OUTPATIENT
Start: 2021-12-30

## 2021-12-30 RX ORDER — LIDOCAINE HYDROCHLORIDE 10 MG/ML
5 INJECTION, SOLUTION EPIDURAL; INFILTRATION; INTRACAUDAL; PERINEURAL AS NEEDED
Status: CANCELLED | OUTPATIENT
Start: 2021-12-30

## 2021-12-30 RX ORDER — MISOPROSTOL 100 UG/1
800 TABLET ORAL AS NEEDED
Status: CANCELLED | OUTPATIENT
Start: 2021-12-30

## 2021-12-30 NOTE — PROGRESS NOTES
Chief Complaint   Patient presents with   • Routine Prenatal Visit     Patient complains of some cramping/pelvic pain mostly when standing        HPI:   , 34w5d gestation reports doing well    ROS:  See Prenatal Episode/Flowsheet  /70   Wt 127 kg (281 lb)   LMP 2021   BMI 41.50 kg/m²      EXAM:  EXTREMITIES:  No swelling-See Prenatal Episode/Flowsheet    ABDOMEN:  FHTs/Movement noted-See Prenatal Episode/Flowsheet    URINE GLUCOSE/PROTEIN:  See Prenatal Episode/Flowsheet    PELVIC EXAM:  See Prenatal Episode/Flowsheet  CV:  Lungs:  GYN:    MDM:    Lab Results   Component Value Date    HGB 12.7 2021    RUBELLAIGGIN Immune 2015    RUBELLAABIGG 4.45 2021    HEPBSAG Negative 2021    LABRPR Non-reactive 2015    ABORH O Rh Positive 10/18/2015    ABO O 2021    RH Positive 2021    ABSCRN Negative 2021    LABANTI Negative 2015    ZMIAQYF21 NonReactive 2015    [id number] Non Reactive 2021    HEPCVIRUSABY <0.1 2021    URINECX No growth 2019       U/S: Overall growth 73rd percentile.  6 pounds 1 ounce.  NONA 15.73.  Vertex.  Posterior placenta.  Active fetus    1. IUP 34w5d  2. Routine care   3. Gluocla today-growth ultrasounds normal.  4. Prior C/S- schedule R C/S and BTL

## 2021-12-31 LAB
BASOPHILS # BLD AUTO: 0.02 10*3/MM3 (ref 0–0.2)
BASOPHILS NFR BLD AUTO: 0.2 % (ref 0–1.5)
EOSINOPHIL # BLD AUTO: 0.16 10*3/MM3 (ref 0–0.4)
EOSINOPHIL NFR BLD AUTO: 1.8 % (ref 0.3–6.2)
ERYTHROCYTE [DISTWIDTH] IN BLOOD BY AUTOMATED COUNT: 13 % (ref 12.3–15.4)
GLUCOSE 1H P 50 G GLC PO SERPL-MCNC: 100 MG/DL (ref 65–139)
HCT VFR BLD AUTO: 33.8 % (ref 34–46.6)
HGB BLD-MCNC: 10.6 G/DL (ref 12–15.9)
IMM GRANULOCYTES # BLD AUTO: 0.07 10*3/MM3 (ref 0–0.05)
IMM GRANULOCYTES NFR BLD AUTO: 0.8 % (ref 0–0.5)
LYMPHOCYTES # BLD AUTO: 2.15 10*3/MM3 (ref 0.7–3.1)
LYMPHOCYTES NFR BLD AUTO: 23.7 % (ref 19.6–45.3)
MCH RBC QN AUTO: 28.3 PG (ref 26.6–33)
MCHC RBC AUTO-ENTMCNC: 31.4 G/DL (ref 31.5–35.7)
MCV RBC AUTO: 90.1 FL (ref 79–97)
MONOCYTES # BLD AUTO: 0.65 10*3/MM3 (ref 0.1–0.9)
MONOCYTES NFR BLD AUTO: 7.2 % (ref 5–12)
NEUTROPHILS # BLD AUTO: 6.02 10*3/MM3 (ref 1.7–7)
NEUTROPHILS NFR BLD AUTO: 66.3 % (ref 42.7–76)
NRBC BLD AUTO-RTO: 0 /100 WBC (ref 0–0.2)
PLATELET # BLD AUTO: 290 10*3/MM3 (ref 140–450)
RBC # BLD AUTO: 3.75 10*6/MM3 (ref 3.77–5.28)
WBC # BLD AUTO: 9.07 10*3/MM3 (ref 3.4–10.8)

## 2022-01-03 RX ORDER — FERROUS SULFATE 325(65) MG
325 TABLET ORAL
Qty: 60 TABLET | Refills: 10 | Status: SHIPPED | OUTPATIENT
Start: 2022-01-03

## 2022-01-10 ENCOUNTER — ROUTINE PRENATAL (OUTPATIENT)
Dept: OBSTETRICS AND GYNECOLOGY | Facility: CLINIC | Age: 32
End: 2022-01-10

## 2022-01-10 VITALS — SYSTOLIC BLOOD PRESSURE: 126 MMHG | DIASTOLIC BLOOD PRESSURE: 68 MMHG | WEIGHT: 277 LBS | BODY MASS INDEX: 40.91 KG/M2

## 2022-01-10 DIAGNOSIS — O99.019 IRON DEFICIENCY ANEMIA DURING PREGNANCY: ICD-10-CM

## 2022-01-10 DIAGNOSIS — Z36.85 ANTENATAL SCREENING FOR STREPTOCOCCUS B: ICD-10-CM

## 2022-01-10 DIAGNOSIS — Z98.891 HX OF CESAREAN SECTION: ICD-10-CM

## 2022-01-10 DIAGNOSIS — O09.893 SHORT INTERVAL BETWEEN PREGNANCIES AFFECTING PREGNANCY IN THIRD TRIMESTER, ANTEPARTUM: ICD-10-CM

## 2022-01-10 DIAGNOSIS — Z30.2 REQUEST FOR STERILIZATION: ICD-10-CM

## 2022-01-10 DIAGNOSIS — D50.9 IRON DEFICIENCY ANEMIA DURING PREGNANCY: ICD-10-CM

## 2022-01-10 DIAGNOSIS — O09.93 ENCOUNTER FOR SUPERVISION OF HIGH RISK PREGNANCY IN THIRD TRIMESTER, ANTEPARTUM: Primary | ICD-10-CM

## 2022-01-10 DIAGNOSIS — O09.33 INSUFFICIENT PRENATAL CARE IN THIRD TRIMESTER: ICD-10-CM

## 2022-01-10 PROCEDURE — 99214 OFFICE O/P EST MOD 30 MIN: CPT | Performed by: OBSTETRICS & GYNECOLOGY

## 2022-01-10 NOTE — PROGRESS NOTES
Chief Complaint  Routine Prenatal Visit (GBS done today, no complaints. )    History of Present Illness:  Comfort is a  currently at 36w2d who presents today with no complaints.  Patient does report good fetal movement.  Patient reports increasing pressure.  Patient reports occasional cramping.  Patient denies leaking any fluid or spotting.  Patient has had insufficient prenatal care.  Patient did have her glucose tolerance test last visit.  Patient has not got her iron supplements filled yet.  Patient does have history of previous  section.  Patient has repeat  section scheduled as well as tubal ligation.    Exam:  Vitals:  See prenatal flowsheet as noted and reviewed  General: Alert, cooperative, and does not appear in any distress  Abdomen:   See prenatal flowsheet as noted and reviewed    Uterus gravid, non-tender; no palpable masses    No guarding or rebound tenderness  Pelvic:  See prenatal flowsheet as noted and reviewed  Ext:  See prenatal flowsheet as noted and reviewed    Moves extremities well, no cyanosis and no redness  Urine:  See prenatal flowsheet as noted and reviewed    Data Review:  The following data was reviewed by: Tatyana Cornejo MD on 01/10/2022:  Prenatal Labs:  Lab Results   Component Value Date    HGB 10.6 (L) 2021    RUBELLAIGGIN Immune 2015    RUBELLAABIGG 4.45 2021    HEPBSAG Negative 2021    LABRPR Non-reactive 2015    ABORH O Rh Positive 10/18/2015    ABO O 2021    RH Positive 2021    ABSCRN Negative 2021    LABANTI Negative 2015    LBRICBT68 NonReactive 2015    ZMP7LEI9 Non Reactive 2021    HEPCVIRUSABY <0.1 2021    URINECX No growth 2019       Routine Prenatal on 2021   Component Date Value   • Gestational Diabetes Scr* 2021 100    • WBC 2021 9.07    • RBC 2021 3.75*   • Hemoglobin 2021 10.6*   • Hematocrit 2021 33.8*   • MCV 2021 90.1    •  MCH 2021 28.3    • MCHC 2021 31.4*   • RDW 2021 13.0    • Platelets 2021 290    • Neutrophil Rel % 2021 66.3    • Lymphocyte Rel % 2021 23.7    • Monocyte Rel % 2021 7.2    • Eosinophil Rel % 2021 1.8    • Basophil Rel % 2021 0.2    • Neutrophils Absolute 2021 6.02    • Lymphocytes Absolute 2021 2.15    • Monocytes Absolute 2021 0.65    • Eosinophils Absolute 2021 0.16    • Basophils Absolute 2021 0.02    • Immature Granulocyte Rel* 2021 0.8*   • Immature Grans Absolute 2021 0.07*   • nRBC 2021 0.0      Imaging:  US Ob Follow Up Transabdominal Approach  Overall growth 73rd percentile.  6 pounds 1 ounce.  NONA 15.73.  Vertex.    Posterior placenta.  Active fetus    Medical Records:  None    Assessment and Plan:  Problem List Items Addressed This Visit     None      Visit Diagnoses     Encounter for supervision of high risk pregnancy in third trimester, antepartum    -  Primary  Topics discussed:      section risks, benefits  iron supplementation  kick counts and fetal movement  labor signs and symptoms  PIH precautions  tubal risks, benefits  Scan last visit as noted     screening for streptococcus B        Relevant Orders    Strep Grp B GONZÁLEZ + Reflex - Swab, Vaginal/Rectum    Hx of  section      Patient has repeat  section scheduled.    Short interval between pregnancies affecting pregnancy in third trimester, antepartum        Insufficient prenatal care in third trimester      Patient with insufficient prenatal care as noted.  Patient has been instructed in the need for regular visits.  Patient is to be seen weekly until her repeat  section.    Iron deficiency anemia during pregnancy      Patient is to start her iron supplements as given.  Instructions and precautions have been given.    Request for sterilization      Patient has signed tubal papers.  The risk, complications,  benefits, as well as other alternatives have been discussed.        Follow Up/Instructions:  Follow up as scheduled.  Patient was given instructions and counseling regarding her condition or for health maintenance advice. Please see specific information pulled into the AVS if appropriate.     Note: Speech recognition transcription software may have been used to dictate portions of this document.  An attempt at proofreading has been made though minor errors in transcription may still be present.    This note was electronically signed.  Tatyana Cornejo M.D.

## 2022-01-12 LAB — GP B STREP DNA SPEC QL NAA+PROBE: NEGATIVE

## 2022-01-24 ENCOUNTER — ROUTINE PRENATAL (OUTPATIENT)
Dept: OBSTETRICS AND GYNECOLOGY | Facility: CLINIC | Age: 32
End: 2022-01-24

## 2022-01-24 VITALS — WEIGHT: 276 LBS | SYSTOLIC BLOOD PRESSURE: 128 MMHG | DIASTOLIC BLOOD PRESSURE: 66 MMHG | BODY MASS INDEX: 40.76 KG/M2

## 2022-01-24 DIAGNOSIS — O09.93 ENCOUNTER FOR SUPERVISION OF HIGH RISK PREGNANCY IN THIRD TRIMESTER, ANTEPARTUM: Primary | ICD-10-CM

## 2022-01-24 DIAGNOSIS — O99.019 IRON DEFICIENCY ANEMIA DURING PREGNANCY: ICD-10-CM

## 2022-01-24 DIAGNOSIS — O09.893 SHORT INTERVAL BETWEEN PREGNANCIES AFFECTING PREGNANCY IN THIRD TRIMESTER, ANTEPARTUM: ICD-10-CM

## 2022-01-24 DIAGNOSIS — O09.33 INSUFFICIENT PRENATAL CARE IN THIRD TRIMESTER: ICD-10-CM

## 2022-01-24 DIAGNOSIS — D50.9 IRON DEFICIENCY ANEMIA DURING PREGNANCY: ICD-10-CM

## 2022-01-24 DIAGNOSIS — Z98.891 HX OF CESAREAN SECTION: ICD-10-CM

## 2022-01-24 DIAGNOSIS — Z30.2 REQUEST FOR STERILIZATION: ICD-10-CM

## 2022-01-24 PROCEDURE — 99214 OFFICE O/P EST MOD 30 MIN: CPT | Performed by: OBSTETRICS & GYNECOLOGY

## 2022-01-24 NOTE — PROGRESS NOTES
Chief Complaint  Routine Prenatal Visit    History of Present Illness:  Comfort is a  currently at 38w2d who presents today with no complaints other than spotting on Thursday postcoital.  Patient has had no further bleeding since that time.  Patient denies any cramping or contractions.  Patient declines examination today.  Patient does have a repeat  section and tubal ligation scheduled.  Patient does report good fetal movement.  Patient is taking her prenatal vitamins and iron supplements.    Exam:  Vitals:  See prenatal flowsheet as noted and reviewed  General: Alert, cooperative, and does not appear in any distress  Abdomen:   See prenatal flowsheet as noted and reviewed    Uterus gravid, non-tender; no palpable masses    No guarding or rebound tenderness  Pelvic:  See prenatal flowsheet as noted and reviewed  Ext:  See prenatal flowsheet as noted and reviewed    Moves extremities well, no cyanosis and no redness  Urine:  See prenatal flowsheet as noted and reviewed    Data Review:  The following data was reviewed by: Tatyana Cornejo MD on 2022:  Prenatal Labs:  Lab Results   Component Value Date    HGB 10.6 (L) 2021    RUBELLAIGGIN Immune 2015    RUBELLAABIGG 4.45 2021    HEPBSAG Negative 2021    LABRPR Non-reactive 2015    ABORH O Rh Positive 10/18/2015    ABO O 2021    RH Positive 2021    ABSCRN Negative 2021    LABANTI Negative 2015    VHSYHVC77 NonReactive 2015    WAV8TFZ9 Non Reactive 2021    HEPCVIRUSABY <0.1 2021    STREPGPB Negative 01/10/2022    URINECX No growth 2019       Routine Prenatal on 01/10/2022   Component Date Value   • Strep Gp B GONZÁLEZ 01/10/2022 Negative    Routine Prenatal on 2021   Component Date Value   • Gestational Diabetes Scr* 2021 100    • WBC 2021 9.07    • RBC 2021 3.75*   • Hemoglobin 2021 10.6*   • Hematocrit 2021 33.8*   • MCV 2021 90.1    •  MCH 2021 28.3    • MCHC 2021 31.4*   • RDW 2021 13.0    • Platelets 2021 290    • Neutrophil Rel % 2021 66.3    • Lymphocyte Rel % 2021 23.7    • Monocyte Rel % 2021 7.2    • Eosinophil Rel % 2021 1.8    • Basophil Rel % 2021 0.2    • Neutrophils Absolute 2021 6.02    • Lymphocytes Absolute 2021 2.15    • Monocytes Absolute 2021 0.65    • Eosinophils Absolute 2021 0.16    • Basophils Absolute 2021 0.02    • Immature Granulocyte Rel* 2021 0.8*   • Immature Grans Absolute 2021 0.07*   • nRBC 2021 0.0      Imaging:  US Ob Follow Up Transabdominal Approach  Overall growth 73rd percentile.  6 pounds 1 ounce.  NONA 15.73.  Vertex.    Posterior placenta.  Active fetus    Medical Records:  None    Assessment and Plan:  Problem List Items Addressed This Visit     None      Visit Diagnoses     Encounter for supervision of high risk pregnancy in third trimester, antepartum    -  Primary  Topics discussed:      section risks, benefits  iron supplementation  kick counts and fetal movement  labor signs and symptoms  PIH precautions  tubal risks, benefits  Instructions and precautions are given.  Preop as discussed today.    Hx of  section      Discussed with the patient the risk, complications, benefits, as well as other alternatives.  Patient has her repeat   scheduled as noted.    Short interval between pregnancies affecting pregnancy in third trimester, antepartum        Insufficient prenatal care in third trimester        Iron deficiency anemia during pregnancy      Patient is to continue her iron supplements as previously given.    Request for sterilization      Patient is desiring permanent surgical sterilization.  Patient has been counseled regarding the risk, complications, benefits, as well as other alternatives.  Patient has signed tubal papers as noted.        Follow Up/Instructions:  Follow up  as scheduled.  Patient was given instructions and counseling regarding her condition or for health maintenance advice. Please see specific information pulled into the AVS if appropriate.     Note: Speech recognition transcription software may have been used to dictate portions of this document.  An attempt at proofreading has been made though minor errors in transcription may still be present.    This note was electronically signed.  Tatyana Cornejo M.D.

## 2022-01-28 ENCOUNTER — LAB (OUTPATIENT)
Dept: LAB | Facility: HOSPITAL | Age: 32
End: 2022-01-28

## 2022-01-28 ENCOUNTER — ROUTINE PRENATAL (OUTPATIENT)
Dept: OBSTETRICS AND GYNECOLOGY | Facility: CLINIC | Age: 32
End: 2022-01-28

## 2022-01-28 VITALS — SYSTOLIC BLOOD PRESSURE: 132 MMHG | WEIGHT: 279 LBS | BODY MASS INDEX: 41.2 KG/M2 | DIASTOLIC BLOOD PRESSURE: 76 MMHG

## 2022-01-28 DIAGNOSIS — Z34.93 THIRD TRIMESTER PREGNANCY: ICD-10-CM

## 2022-01-28 DIAGNOSIS — Z98.891 PREVIOUS CESAREAN SECTION: ICD-10-CM

## 2022-01-28 DIAGNOSIS — Z34.93 PRENATAL CARE IN THIRD TRIMESTER: Primary | ICD-10-CM

## 2022-01-28 LAB — SARS-COV-2 RNA NOSE QL NAA+PROBE: NOT DETECTED

## 2022-01-28 PROCEDURE — 99213 OFFICE O/P EST LOW 20 MIN: CPT | Performed by: OBSTETRICS & GYNECOLOGY

## 2022-01-28 PROCEDURE — U0004 COV-19 TEST NON-CDC HGH THRU: HCPCS

## 2022-01-28 PROCEDURE — C9803 HOPD COVID-19 SPEC COLLECT: HCPCS

## 2022-01-28 NOTE — PROGRESS NOTES
Prenatal Care Visit    Subjective   Chief Complaint   Patient presents with   • Routine Prenatal Visit     No complaints.        History:   Comfort is a  currently at 38w6d who presents for a prenatal care visit today.    No major issues.    Social History    Tobacco Use      Smoking status: Current Every Day Smoker        Packs/day: 0.50        Years: 8.00        Pack years: 4        Types: Cigarettes      Smokeless tobacco: Never Used       Objective   /76   Wt 127 kg (279 lb)   LMP 2021   BMI 41.20 kg/m²   Physical Exam:  Normal, gestational age-appropriate exam today        Plan   Medical Decision Making:    I have reviewed the prenatal labs and ultrasound(s) today. I have reviewed the most recent prenatal progress note(s).    Diagnosis: Supervision of low risk pregnancy  Previous C/S with planned repeat C/S   BMI 41   Tests/Orders/Rx today: No orders of the defined types were placed in this encounter.      Medication Management: None     Topics discussed: Prenatal care milestones  kick counts and fetal movement  labor signs and symptoms  PIH precautions   She no longer desires sterilization at the time of delivery   Tests next visit: none   Next visit: none     Bebo Hinojosa MD  Obstetrics and Gynecology  [facility]

## 2022-02-01 ENCOUNTER — HOSPITAL ENCOUNTER (INPATIENT)
Facility: HOSPITAL | Age: 32
LOS: 2 days | Discharge: HOME | End: 2022-02-03
Attending: OBSTETRICS & GYNECOLOGY | Admitting: OBSTETRICS & GYNECOLOGY

## 2022-02-01 ENCOUNTER — ANESTHESIA (OUTPATIENT)
Dept: PERIOP | Facility: HOSPITAL | Age: 32
End: 2022-02-01

## 2022-02-01 ENCOUNTER — ANESTHESIA EVENT (OUTPATIENT)
Dept: PERIOP | Facility: HOSPITAL | Age: 32
End: 2022-02-01

## 2022-02-01 DIAGNOSIS — Z34.93 THIRD TRIMESTER PREGNANCY: ICD-10-CM

## 2022-02-01 DIAGNOSIS — Z98.891 S/P CESAREAN SECTION: Primary | ICD-10-CM

## 2022-02-01 LAB
ABO GROUP BLD: NORMAL
ABO GROUP BLD: NORMAL
BASOPHILS # BLD AUTO: 0.01 10*3/MM3 (ref 0–0.2)
BASOPHILS NFR BLD AUTO: 0.1 % (ref 0–1.5)
BILIRUB BLD-MCNC: ABNORMAL MG/DL
BLD GP AB SCN SERPL QL: NEGATIVE
CLARITY, POC: CLEAR
COLOR UR: YELLOW
DEPRECATED RDW RBC AUTO: 47 FL (ref 37–54)
EOSINOPHIL # BLD AUTO: 0.13 10*3/MM3 (ref 0–0.4)
EOSINOPHIL NFR BLD AUTO: 1.9 % (ref 0.3–6.2)
ERYTHROCYTE [DISTWIDTH] IN BLOOD BY AUTOMATED COUNT: 14.3 % (ref 12.3–15.4)
GLUCOSE UR STRIP-MCNC: NEGATIVE MG/DL
HCT VFR BLD AUTO: 36.2 % (ref 34–46.6)
HGB BLD-MCNC: 11.4 G/DL (ref 12–15.9)
IMM GRANULOCYTES # BLD AUTO: 0.03 10*3/MM3 (ref 0–0.05)
IMM GRANULOCYTES NFR BLD AUTO: 0.4 % (ref 0–0.5)
KETONES UR QL: ABNORMAL
LEUKOCYTE EST, POC: NEGATIVE
LYMPHOCYTES # BLD AUTO: 2.33 10*3/MM3 (ref 0.7–3.1)
LYMPHOCYTES NFR BLD AUTO: 33.4 % (ref 19.6–45.3)
MCH RBC QN AUTO: 28.4 PG (ref 26.6–33)
MCHC RBC AUTO-ENTMCNC: 31.5 G/DL (ref 31.5–35.7)
MCV RBC AUTO: 90 FL (ref 79–97)
MONOCYTES # BLD AUTO: 0.54 10*3/MM3 (ref 0.1–0.9)
MONOCYTES NFR BLD AUTO: 7.7 % (ref 5–12)
NEUTROPHILS NFR BLD AUTO: 3.94 10*3/MM3 (ref 1.7–7)
NEUTROPHILS NFR BLD AUTO: 56.5 % (ref 42.7–76)
NITRITE UR-MCNC: NEGATIVE MG/ML
NRBC BLD AUTO-RTO: 0 /100 WBC (ref 0–0.2)
PH UR: 6 [PH] (ref 5–8)
PLATELET # BLD AUTO: 296 10*3/MM3 (ref 140–450)
PMV BLD AUTO: 10.5 FL (ref 6–12)
PROT UR STRIP-MCNC: ABNORMAL MG/DL
RBC # BLD AUTO: 4.02 10*6/MM3 (ref 3.77–5.28)
RBC # UR STRIP: NEGATIVE /UL
RH BLD: POSITIVE
RH BLD: POSITIVE
SP GR UR: 1.02 (ref 1–1.03)
T&S EXPIRATION DATE: NORMAL
UROBILINOGEN UR QL: NORMAL
WBC NRBC COR # BLD: 6.98 10*3/MM3 (ref 3.4–10.8)

## 2022-02-01 PROCEDURE — 86900 BLOOD TYPING SEROLOGIC ABO: CPT | Performed by: OBSTETRICS & GYNECOLOGY

## 2022-02-01 PROCEDURE — 51703 INSERT BLADDER CATH COMPLEX: CPT

## 2022-02-01 PROCEDURE — 25010000002 FENTANYL CITRATE (PF) 100 MCG/2ML SOLUTION: Performed by: NURSE ANESTHETIST, CERTIFIED REGISTERED

## 2022-02-01 PROCEDURE — 25010000002 METHYLERGONOVINE MALEATE PER 0.2 MG: Performed by: OBSTETRICS & GYNECOLOGY

## 2022-02-01 PROCEDURE — 85025 COMPLETE CBC W/AUTO DIFF WBC: CPT | Performed by: OBSTETRICS & GYNECOLOGY

## 2022-02-01 PROCEDURE — 59025 FETAL NON-STRESS TEST: CPT

## 2022-02-01 PROCEDURE — 25010000002 MORPHINE PER 10 MG: Performed by: NURSE ANESTHETIST, CERTIFIED REGISTERED

## 2022-02-01 PROCEDURE — 86901 BLOOD TYPING SEROLOGIC RH(D): CPT | Performed by: OBSTETRICS & GYNECOLOGY

## 2022-02-01 PROCEDURE — 86901 BLOOD TYPING SEROLOGIC RH(D): CPT

## 2022-02-01 PROCEDURE — 25010000002 MORPHINE SULFATE (PF) 2 MG/ML SOLUTION: Performed by: OBSTETRICS & GYNECOLOGY

## 2022-02-01 PROCEDURE — 25010000002 PHENYLEPHRINE 10 MG/ML SOLUTION: Performed by: NURSE ANESTHETIST, CERTIFIED REGISTERED

## 2022-02-01 PROCEDURE — 59514 CESAREAN DELIVERY ONLY: CPT | Performed by: OBSTETRICS & GYNECOLOGY

## 2022-02-01 PROCEDURE — 0HB7XZZ EXCISION OF ABDOMEN SKIN, EXTERNAL APPROACH: ICD-10-PCS | Performed by: OBSTETRICS & GYNECOLOGY

## 2022-02-01 PROCEDURE — 81002 URINALYSIS NONAUTO W/O SCOPE: CPT | Performed by: OBSTETRICS & GYNECOLOGY

## 2022-02-01 PROCEDURE — 25010000002 CEFAZOLIN PER 500 MG

## 2022-02-01 PROCEDURE — 86900 BLOOD TYPING SEROLOGIC ABO: CPT

## 2022-02-01 PROCEDURE — 86850 RBC ANTIBODY SCREEN: CPT | Performed by: OBSTETRICS & GYNECOLOGY

## 2022-02-01 RX ORDER — SODIUM CHLORIDE 0.9 % (FLUSH) 0.9 %
10 SYRINGE (ML) INJECTION AS NEEDED
Status: DISCONTINUED | OUTPATIENT
Start: 2022-02-01 | End: 2022-02-01 | Stop reason: HOSPADM

## 2022-02-01 RX ORDER — SODIUM CHLORIDE, SODIUM LACTATE, POTASSIUM CHLORIDE, CALCIUM CHLORIDE 600; 310; 30; 20 MG/100ML; MG/100ML; MG/100ML; MG/100ML
INJECTION, SOLUTION INTRAVENOUS CONTINUOUS PRN
Status: DISCONTINUED | OUTPATIENT
Start: 2022-02-01 | End: 2022-02-01 | Stop reason: SURG

## 2022-02-01 RX ORDER — OXYCODONE HYDROCHLORIDE AND ACETAMINOPHEN 5; 325 MG/1; MG/1
1 TABLET ORAL EVERY 4 HOURS PRN
Status: DISCONTINUED | OUTPATIENT
Start: 2022-02-01 | End: 2022-02-01

## 2022-02-01 RX ORDER — ACETAMINOPHEN 500 MG
1000 TABLET ORAL EVERY 8 HOURS SCHEDULED
Status: DISCONTINUED | OUTPATIENT
Start: 2022-02-01 | End: 2022-02-03 | Stop reason: HOSPADM

## 2022-02-01 RX ORDER — FAMOTIDINE 10 MG/ML
20 INJECTION, SOLUTION INTRAVENOUS
Status: COMPLETED | OUTPATIENT
Start: 2022-02-01 | End: 2022-02-01

## 2022-02-01 RX ORDER — TRISODIUM CITRATE DIHYDRATE AND CITRIC ACID MONOHYDRATE 500; 334 MG/5ML; MG/5ML
30 SOLUTION ORAL ONCE
Status: COMPLETED | OUTPATIENT
Start: 2022-02-01 | End: 2022-02-01

## 2022-02-01 RX ORDER — ONDANSETRON 2 MG/ML
4 INJECTION INTRAMUSCULAR; INTRAVENOUS ONCE AS NEEDED
Status: DISCONTINUED | OUTPATIENT
Start: 2022-02-01 | End: 2022-02-01 | Stop reason: HOSPADM

## 2022-02-01 RX ORDER — FENTANYL CITRATE 50 UG/ML
INJECTION, SOLUTION INTRAMUSCULAR; INTRAVENOUS AS NEEDED
Status: DISCONTINUED | OUTPATIENT
Start: 2022-02-01 | End: 2022-02-01 | Stop reason: SURG

## 2022-02-01 RX ORDER — MISOPROSTOL 200 UG/1
800 TABLET ORAL AS NEEDED
Status: DISCONTINUED | OUTPATIENT
Start: 2022-02-01 | End: 2022-02-01 | Stop reason: HOSPADM

## 2022-02-01 RX ORDER — MEPERIDINE HYDROCHLORIDE 25 MG/ML
25 INJECTION INTRAMUSCULAR; INTRAVENOUS; SUBCUTANEOUS ONCE AS NEEDED
Status: DISCONTINUED | OUTPATIENT
Start: 2022-02-01 | End: 2022-02-01 | Stop reason: HOSPADM

## 2022-02-01 RX ORDER — DOCUSATE SODIUM 100 MG/1
100 CAPSULE, LIQUID FILLED ORAL 2 TIMES DAILY PRN
Status: DISCONTINUED | OUTPATIENT
Start: 2022-02-01 | End: 2022-02-03 | Stop reason: HOSPADM

## 2022-02-01 RX ORDER — BUPIVACAINE HYDROCHLORIDE 7.5 MG/ML
INJECTION, SOLUTION EPIDURAL; RETROBULBAR
Status: COMPLETED | OUTPATIENT
Start: 2022-02-01 | End: 2022-02-01

## 2022-02-01 RX ORDER — NALOXONE HCL 0.4 MG/ML
0.4 VIAL (ML) INJECTION
Status: DISCONTINUED | OUTPATIENT
Start: 2022-02-01 | End: 2022-02-03 | Stop reason: HOSPADM

## 2022-02-01 RX ORDER — OXYTOCIN/0.9 % SODIUM CHLORIDE 30/500 ML
85 PLASTIC BAG, INJECTION (ML) INTRAVENOUS ONCE
Status: DISCONTINUED | OUTPATIENT
Start: 2022-02-01 | End: 2022-02-03 | Stop reason: HOSPADM

## 2022-02-01 RX ORDER — ONDANSETRON 2 MG/ML
4 INJECTION INTRAMUSCULAR; INTRAVENOUS EVERY 6 HOURS PRN
Status: DISCONTINUED | OUTPATIENT
Start: 2022-02-01 | End: 2022-02-03 | Stop reason: HOSPADM

## 2022-02-01 RX ORDER — ONDANSETRON 2 MG/ML
4 INJECTION INTRAMUSCULAR; INTRAVENOUS ONCE AS NEEDED
Status: ACTIVE | OUTPATIENT
Start: 2022-02-01 | End: 2022-02-02

## 2022-02-01 RX ORDER — NALOXONE HCL 0.4 MG/ML
0.4 VIAL (ML) INJECTION
Status: ACTIVE | OUTPATIENT
Start: 2022-02-01 | End: 2022-02-02

## 2022-02-01 RX ORDER — SIMETHICONE 80 MG
80 TABLET,CHEWABLE ORAL 4 TIMES DAILY PRN
Status: DISCONTINUED | OUTPATIENT
Start: 2022-02-01 | End: 2022-02-03 | Stop reason: HOSPADM

## 2022-02-01 RX ORDER — MAGNESIUM HYDROXIDE 1200 MG/15ML
LIQUID ORAL AS NEEDED
Status: DISCONTINUED | OUTPATIENT
Start: 2022-02-01 | End: 2022-02-01 | Stop reason: HOSPADM

## 2022-02-01 RX ORDER — MORPHINE SULFATE 2 MG/ML
2 INJECTION, SOLUTION INTRAMUSCULAR; INTRAVENOUS EVERY 4 HOURS PRN
Status: DISCONTINUED | OUTPATIENT
Start: 2022-02-01 | End: 2022-02-03 | Stop reason: HOSPADM

## 2022-02-01 RX ORDER — NALBUPHINE HCL 10 MG/ML
2.5 AMPUL (ML) INJECTION EVERY 4 HOURS PRN
Status: ACTIVE | OUTPATIENT
Start: 2022-02-01 | End: 2022-02-02

## 2022-02-01 RX ORDER — DIPHENHYDRAMINE HCL 25 MG
25 CAPSULE ORAL EVERY 4 HOURS PRN
Status: DISCONTINUED | OUTPATIENT
Start: 2022-02-01 | End: 2022-02-03 | Stop reason: HOSPADM

## 2022-02-01 RX ORDER — ALUMINA, MAGNESIA, AND SIMETHICONE 2400; 2400; 240 MG/30ML; MG/30ML; MG/30ML
15 SUSPENSION ORAL EVERY 4 HOURS PRN
Status: DISCONTINUED | OUTPATIENT
Start: 2022-02-01 | End: 2022-02-03 | Stop reason: HOSPADM

## 2022-02-01 RX ORDER — CEFAZOLIN SODIUM IN 0.9 % NACL 3 G/100 ML
3 INTRAVENOUS SOLUTION, PIGGYBACK (ML) INTRAVENOUS ONCE
Status: COMPLETED | OUTPATIENT
Start: 2022-02-01 | End: 2022-02-01

## 2022-02-01 RX ORDER — OXYTOCIN 10 [USP'U]/ML
INJECTION, SOLUTION INTRAMUSCULAR; INTRAVENOUS AS NEEDED
Status: DISCONTINUED | OUTPATIENT
Start: 2022-02-01 | End: 2022-02-01 | Stop reason: SURG

## 2022-02-01 RX ORDER — LIDOCAINE HYDROCHLORIDE 10 MG/ML
5 INJECTION, SOLUTION EPIDURAL; INFILTRATION; INTRACAUDAL; PERINEURAL AS NEEDED
Status: DISCONTINUED | OUTPATIENT
Start: 2022-02-01 | End: 2022-02-01 | Stop reason: HOSPADM

## 2022-02-01 RX ORDER — SODIUM CHLORIDE 0.9 % (FLUSH) 0.9 %
10 SYRINGE (ML) INJECTION EVERY 12 HOURS SCHEDULED
Status: DISCONTINUED | OUTPATIENT
Start: 2022-02-01 | End: 2022-02-01 | Stop reason: HOSPADM

## 2022-02-01 RX ORDER — NALOXONE HYDROCHLORIDE 0.4 MG/ML
0.08 INJECTION, SOLUTION INTRAMUSCULAR; INTRAVENOUS; SUBCUTANEOUS ONCE
Status: DISCONTINUED | OUTPATIENT
Start: 2022-02-01 | End: 2022-02-01 | Stop reason: HOSPADM

## 2022-02-01 RX ORDER — PHENYLEPHRINE HYDROCHLORIDE 10 MG/ML
INJECTION INTRAVENOUS AS NEEDED
Status: DISCONTINUED | OUTPATIENT
Start: 2022-02-01 | End: 2022-02-01 | Stop reason: SURG

## 2022-02-01 RX ORDER — CALCIUM CARBONATE 200(500)MG
1 TABLET,CHEWABLE ORAL EVERY 4 HOURS PRN
Status: DISCONTINUED | OUTPATIENT
Start: 2022-02-01 | End: 2022-02-03 | Stop reason: HOSPADM

## 2022-02-01 RX ORDER — PRENATAL VIT/IRON FUM/FOLIC AC 27MG-0.8MG
1 TABLET ORAL DAILY
Status: DISCONTINUED | OUTPATIENT
Start: 2022-02-01 | End: 2022-02-03 | Stop reason: HOSPADM

## 2022-02-01 RX ORDER — MORPHINE SULFATE 1 MG/ML
INJECTION, SOLUTION EPIDURAL; INTRATHECAL; INTRAVENOUS AS NEEDED
Status: DISCONTINUED | OUTPATIENT
Start: 2022-02-01 | End: 2022-02-01 | Stop reason: SURG

## 2022-02-01 RX ORDER — NALBUPHINE HCL 10 MG/ML
2 AMPUL (ML) INJECTION
Status: DISCONTINUED | OUTPATIENT
Start: 2022-02-01 | End: 2022-02-01 | Stop reason: HOSPADM

## 2022-02-01 RX ORDER — OXYTOCIN/0.9 % SODIUM CHLORIDE 30/500 ML
650 PLASTIC BAG, INJECTION (ML) INTRAVENOUS ONCE
Status: DISCONTINUED | OUTPATIENT
Start: 2022-02-01 | End: 2022-02-03 | Stop reason: HOSPADM

## 2022-02-01 RX ORDER — OXYCODONE AND ACETAMINOPHEN 7.5; 325 MG/1; MG/1
1 TABLET ORAL EVERY 4 HOURS PRN
Status: DISCONTINUED | OUTPATIENT
Start: 2022-02-01 | End: 2022-02-01

## 2022-02-01 RX ORDER — CEFAZOLIN SODIUM 1 G/3ML
1 INJECTION, POWDER, FOR SOLUTION INTRAMUSCULAR; INTRAVENOUS EVERY 8 HOURS
Status: DISCONTINUED | OUTPATIENT
Start: 2022-02-01 | End: 2022-02-01

## 2022-02-01 RX ORDER — SODIUM CHLORIDE 9 MG/ML
125 INJECTION, SOLUTION INTRAVENOUS CONTINUOUS
Status: DISCONTINUED | OUTPATIENT
Start: 2022-02-01 | End: 2022-02-03 | Stop reason: HOSPADM

## 2022-02-01 RX ORDER — IBUPROFEN 600 MG/1
600 TABLET ORAL EVERY 8 HOURS SCHEDULED
Status: DISCONTINUED | OUTPATIENT
Start: 2022-02-01 | End: 2022-02-03 | Stop reason: HOSPADM

## 2022-02-01 RX ORDER — OXYCODONE HYDROCHLORIDE 5 MG/1
5 TABLET ORAL EVERY 4 HOURS PRN
Status: DISCONTINUED | OUTPATIENT
Start: 2022-02-01 | End: 2022-02-03 | Stop reason: HOSPADM

## 2022-02-01 RX ORDER — CARBOPROST TROMETHAMINE 250 UG/ML
250 INJECTION, SOLUTION INTRAMUSCULAR AS NEEDED
Status: DISCONTINUED | OUTPATIENT
Start: 2022-02-01 | End: 2022-02-01 | Stop reason: HOSPADM

## 2022-02-01 RX ORDER — IBUPROFEN 600 MG/1
600 TABLET ORAL EVERY 8 HOURS PRN
Status: DISCONTINUED | OUTPATIENT
Start: 2022-02-01 | End: 2022-02-01

## 2022-02-01 RX ORDER — METHYLERGONOVINE MALEATE 0.2 MG/ML
200 INJECTION INTRAVENOUS ONCE AS NEEDED
Status: COMPLETED | OUTPATIENT
Start: 2022-02-01 | End: 2022-02-01

## 2022-02-01 RX ADMIN — SODIUM CHLORIDE, POTASSIUM CHLORIDE, SODIUM LACTATE AND CALCIUM CHLORIDE: 600; 310; 30; 20 INJECTION, SOLUTION INTRAVENOUS at 07:21

## 2022-02-01 RX ADMIN — BUPIVACAINE HYDROCHLORIDE 1.5 ML: 7.5 INJECTION, SOLUTION EPIDURAL; RETROBULBAR at 07:40

## 2022-02-01 RX ADMIN — MORPHINE SULFATE 200 MCG: 1 INJECTION EPIDURAL; INTRATHECAL; INTRAVENOUS at 07:40

## 2022-02-01 RX ADMIN — PHENYLEPHRINE HYDROCHLORIDE 200 MCG: 10 INJECTION INTRAVENOUS at 07:45

## 2022-02-01 RX ADMIN — OXYTOCIN 20 UNITS: 10 INJECTION INTRAVENOUS at 08:17

## 2022-02-01 RX ADMIN — DOCUSATE SODIUM 100 MG: 100 CAPSULE, LIQUID FILLED ORAL at 22:36

## 2022-02-01 RX ADMIN — FAMOTIDINE 20 MG: 10 INJECTION INTRAVENOUS at 06:36

## 2022-02-01 RX ADMIN — IBUPROFEN 600 MG: 600 TABLET ORAL at 22:36

## 2022-02-01 RX ADMIN — Medication 3 G: at 07:31

## 2022-02-01 RX ADMIN — SODIUM CHLORIDE 125 ML/HR: 9 INJECTION, SOLUTION INTRAVENOUS at 09:27

## 2022-02-01 RX ADMIN — SODIUM CHLORIDE 125 ML/HR: 9 INJECTION, SOLUTION INTRAVENOUS at 18:26

## 2022-02-01 RX ADMIN — SODIUM CHLORIDE, POTASSIUM CHLORIDE, SODIUM LACTATE AND CALCIUM CHLORIDE: 600; 310; 30; 20 INJECTION, SOLUTION INTRAVENOUS at 07:42

## 2022-02-01 RX ADMIN — SODIUM CITRATE AND CITRIC ACID MONOHYDRATE 30 ML: 500; 334 SOLUTION ORAL at 06:39

## 2022-02-01 RX ADMIN — METHYLERGONOVINE MALEATE 200 MCG: 0.2 INJECTION, SOLUTION INTRAMUSCULAR; INTRAVENOUS at 09:07

## 2022-02-01 RX ADMIN — FENTANYL CITRATE 15 MCG: 50 INJECTION INTRAMUSCULAR; INTRAVENOUS at 07:40

## 2022-02-01 RX ADMIN — IBUPROFEN 600 MG: 600 TABLET ORAL at 14:03

## 2022-02-01 RX ADMIN — ACETAMINOPHEN 1000 MG: 500 TABLET ORAL at 18:22

## 2022-02-01 RX ADMIN — OXYTOCIN 20 UNITS: 10 INJECTION INTRAVENOUS at 07:57

## 2022-02-01 RX ADMIN — SODIUM CHLORIDE, POTASSIUM CHLORIDE, SODIUM LACTATE AND CALCIUM CHLORIDE 2000 ML: 600; 310; 30; 20 INJECTION, SOLUTION INTRAVENOUS at 06:51

## 2022-02-01 RX ADMIN — EPHEDRINE SULFATE 50 MG: 50 INJECTION INTRAMUSCULAR; INTRAVENOUS; SUBCUTANEOUS at 07:44

## 2022-02-01 RX ADMIN — SODIUM CHLORIDE, POTASSIUM CHLORIDE, SODIUM LACTATE AND CALCIUM CHLORIDE: 600; 310; 30; 20 INJECTION, SOLUTION INTRAVENOUS at 08:17

## 2022-02-01 RX ADMIN — MORPHINE SULFATE 2 MG: 2 INJECTION, SOLUTION INTRAMUSCULAR; INTRAVENOUS at 11:58

## 2022-02-01 NOTE — OP NOTE
Anoop Main  : 1990  MRN: [id number]  CSN: [id number]    Operative Report    Pre-Operative Dx:   1. IUP at 39w3d weeks   2. Previous  section - declines       Postoperative dx:    1. Same as above     Procedure: Repeat  (LTCS)       Surgeon: Johny Craven MD   Assistant: staff  was responsible for performing the following activities: Retraction and Placing Dressing and their skilled assistance was necessary for the success of this case.         Anesthesia: Spinal        EBL: <950 mls.       Antibiotics: cefazolin 2 gms     Drains: Sidhu     Findings: VMI, 3VC, intact placenta, normal adnexa       Indications:  Patient is a 31-year-old presenting for repeat  at term.  She originally wanted a tubal as well but declined against in the morning of surgery.  Risks benefits and alternatives were discussed all questions were answered.           Procedure Details:   After the appropriate time out, the patient was prepped and draped in the usual sterile fashion.  She had been placed in the dorsal supine left lateral tilt position.  A sidhu catheter had been placed in the bladder for drainage during the procedure.  Original keloid scar from primary  section was excised circumferentially with a scalpel.  A pfannenstiel skin incision was made with a knife and carried down to the fascia.  The fascia was nicked with a scalpel and the incision was extended bilaterally with curved Garces scissors. The superior aspect of the fascia was grasped with Kocher clamps x 2 and bluntly as well as sharply dissected away from the underlying rectus muscles.  A similar process was repeated inferiorly. The rectus muscles were  and the peritoneal cavity was entered sharply without complications. The bladder flap was created with Metzenbaum scissors and pickups with teeth.  The  retractor was placed and after checking for no entrapment, was  retracted down.  A transverse uterine incision was made with the knife and extended bilaterally.  The infant was delivered from the vertex presentation.  The mouth and nose were bulb suctioned.  The cord was doubly clamped and cut and the infant handed to the pediatric nurse in attendance.  Cord blood was obtained.  The placenta was manually extracted from the uterus.  The uterus was then elevated from the abdomen and wiped free of remaining membranes.  The uterine incision was closed with #1 chromic in a running locking fashion with a second imbricating stitch.  The uterus had been returned to the abdomen.  The lateral gutters were wiped free of remaining clots.  The site of surgical incision was inspected and noted to be hemostatic.  The  retractor was removed.  The subfascial tissue was inspected and noted to be hemostatic.  Peritoneum was closed with 3-0 Vicryl in a running fashion.  The fascia was closed with 0 PDS in a running non-locking fashion.  Subcutaneous tissue was inspected and noted to be hemostatic with minimal bovie electrocautery.  Kelsie's fascia was closed with 3-0 Vicryl in a running non-locking fashion.  The skin was approximated with 4-0 Vicryl on a William needle in a running subcuticular fashion.  Steri-Strips were placed.. Dressings were placed.  All instrument and sponge counts were correct at the end of the procedure. The patient tolerated the procedure well.  There were no complications.  She was taken to postoperative recovery room in stable condition.          Complications:   None      Disposition:   Mother to Mother Baby/Postpartum  in stable condition currently.   Baby to NBN  in stable condition currently.     Johny Craven MD  2022  08:44 EST

## 2022-02-01 NOTE — NON STRESS TEST
Comfort Main, a  at 39w3d with an MERCEDES of 2022, by Last Menstrual Period, was seen at [facility] for a nonstress test.    Chief Complaint   Patient presents with   • Scheduled        Patient Active Problem List   Diagnosis   • Obesity (BMI 30-39.9)   • Pregnancy   • Third trimester pregnancy   • Previous  section       Start Time: 0430  Stop Time:0500    Interpretation A  Nonstress Test Interpretation A: Reactive (22 0500 : Mary Calvert, RN)

## 2022-02-01 NOTE — ANESTHESIA PROCEDURE NOTES
Spinal Block      Patient location during procedure: OB  Indication:at surgeon's request and procedure for pain  Performed By  CRNA: Jose A Gil CRNA  Preanesthetic Checklist  Completed: patient identified, IV checked, site marked, risks and benefits discussed, surgical consent, monitors and equipment checked, pre-op evaluation and timeout performed  Spinal Block Prep:  Patient Position:sitting  Sterile Tech:cap, gloves, gown, mask and sterile barriers  Prep:Chloraprep  Patient Monitoring:blood pressure monitoring and continuous pulse oximetry  Spinal Block Procedure  Approach:midline  Guidance:palpation technique  Location:L3-L4  Needle Type:Pencan  Needle Gauge:25 G  Placement of Spinal needle event:cerebrospinal fluid aspirated    Fluid Appearance:clear  Medications: bupivacaine PF (MARCAINE) injection 0.75%, 1.5 mL   Post Assessment  Patient Tolerance:patient tolerated the procedure well with no apparent complications  Complications no

## 2022-02-01 NOTE — ANESTHESIA POSTPROCEDURE EVALUATION
Patient: Comfort Main    Procedure Summary     Date: 22 Room / Location: [facility] OR 2 /  YING OR    Anesthesia Start: 721 Anesthesia Stop: 845    Procedure:  SECTION REPEAT (N/A Abdomen) Diagnosis:       Third trimester pregnancy      (Third trimester pregnancy [Z34.93])    Surgeons: Johny Craven MD Provider: Jose A Gil CRNA    Anesthesia Type: spinal ASA Status: 3          Anesthesia Type: spinal    Vitals  Vitals Value Taken Time   /80 22 1030   Temp 97.7 °F (36.5 °C) 22 1030   Pulse 66 22 1030   Resp 17 22 1030   SpO2 98 % 22 1030           Post Anesthesia Care and Evaluation    Patient location during evaluation: bedside  Patient participation: complete - patient participated  Level of consciousness: awake  Pain score: 0  Pain management: adequate  Airway patency: patent  Anesthetic complications: No anesthetic complications  PONV Status: controlled  Cardiovascular status: acceptable and stable  Respiratory status: acceptable and room air  Hydration status: acceptable

## 2022-02-01 NOTE — PLAN OF CARE
Goal Outcome Evaluation:  Plan of Care Reviewed With: patient, significant other        Progress: no change  Outcome Summary: Pt arrived for C/S.  Pre-op done.  IV started. Pt denies pain.  Pt instructed on C/S. VU.

## 2022-02-01 NOTE — ANESTHESIA PREPROCEDURE EVALUATION
Anesthesia Evaluation     Patient summary reviewed and Nursing notes reviewed   no history of anesthetic complications:  NPO Solid Status: > 8 hours  NPO Liquid Status: > 8 hours           Airway   Mallampati: II  TM distance: <3 FB  Neck ROM: full  Possible difficult intubation  Dental - normal exam     Pulmonary - normal exam   (+) a smoker Current,   Cardiovascular - negative cardio ROS and normal exam    Rhythm: regular  Rate: normal        Neuro/Psych  (+) psychiatric history Depression,     GI/Hepatic/Renal/Endo    (+) morbid obesity,      Musculoskeletal     Abdominal   (+) obese,     Abdomen: soft.  Bowel sounds: normal.   Substance History      OB/GYN    (+) Pregnant,         Other        ROS/Med Hx Other: Hgb 11.4  Plt 296                  Anesthesia Plan    ASA 3     spinal   (Risks discussed , including but not limited to:  Headache, itching, n&v, infection, failure, decreased blood pressure, permanent/chronic back pain, nerve damage, paralysis, etc. All questions answered and informed consent obtained.)    Anesthetic plan, all risks, benefits, and alternatives have been provided, discussed and informed consent has been obtained with: patient.        CODE STATUS:

## 2022-02-02 LAB
BASOPHILS # BLD AUTO: 0.03 10*3/MM3 (ref 0–0.2)
BASOPHILS NFR BLD AUTO: 0.3 % (ref 0–1.5)
DEPRECATED RDW RBC AUTO: 48.5 FL (ref 37–54)
EOSINOPHIL # BLD AUTO: 0.12 10*3/MM3 (ref 0–0.4)
EOSINOPHIL NFR BLD AUTO: 1.3 % (ref 0.3–6.2)
ERYTHROCYTE [DISTWIDTH] IN BLOOD BY AUTOMATED COUNT: 14.6 % (ref 12.3–15.4)
HCT VFR BLD AUTO: 30.7 % (ref 34–46.6)
HGB BLD-MCNC: 9.8 G/DL (ref 12–15.9)
IMM GRANULOCYTES # BLD AUTO: 0.05 10*3/MM3 (ref 0–0.05)
IMM GRANULOCYTES NFR BLD AUTO: 0.5 % (ref 0–0.5)
LYMPHOCYTES # BLD AUTO: 2.05 10*3/MM3 (ref 0.7–3.1)
LYMPHOCYTES NFR BLD AUTO: 21.4 % (ref 19.6–45.3)
MCH RBC QN AUTO: 29.2 PG (ref 26.6–33)
MCHC RBC AUTO-ENTMCNC: 31.9 G/DL (ref 31.5–35.7)
MCV RBC AUTO: 91.4 FL (ref 79–97)
MONOCYTES # BLD AUTO: 0.62 10*3/MM3 (ref 0.1–0.9)
MONOCYTES NFR BLD AUTO: 6.5 % (ref 5–12)
NEUTROPHILS NFR BLD AUTO: 6.69 10*3/MM3 (ref 1.7–7)
NEUTROPHILS NFR BLD AUTO: 70 % (ref 42.7–76)
NRBC BLD AUTO-RTO: 0 /100 WBC (ref 0–0.2)
PLATELET # BLD AUTO: 266 10*3/MM3 (ref 140–450)
PMV BLD AUTO: 10.8 FL (ref 6–12)
RBC # BLD AUTO: 3.36 10*6/MM3 (ref 3.77–5.28)
WBC NRBC COR # BLD: 9.56 10*3/MM3 (ref 3.4–10.8)

## 2022-02-02 PROCEDURE — 85025 COMPLETE CBC W/AUTO DIFF WBC: CPT | Performed by: OBSTETRICS & GYNECOLOGY

## 2022-02-02 PROCEDURE — 99231 SBSQ HOSP IP/OBS SF/LOW 25: CPT | Performed by: PHYSICIAN ASSISTANT

## 2022-02-02 RX ORDER — NICOTINE 21 MG/24HR
1 PATCH, TRANSDERMAL 24 HOURS TRANSDERMAL EVERY 24 HOURS
Status: DISCONTINUED | OUTPATIENT
Start: 2022-02-02 | End: 2022-02-02

## 2022-02-02 RX ORDER — NICOTINE 21 MG/24HR
1 PATCH, TRANSDERMAL 24 HOURS TRANSDERMAL EVERY 24 HOURS
Status: DISCONTINUED | OUTPATIENT
Start: 2022-02-02 | End: 2022-02-03 | Stop reason: HOSPADM

## 2022-02-02 RX ADMIN — IBUPROFEN 600 MG: 600 TABLET ORAL at 06:10

## 2022-02-02 RX ADMIN — ACETAMINOPHEN 1000 MG: 500 TABLET ORAL at 10:15

## 2022-02-02 RX ADMIN — DOCUSATE SODIUM 100 MG: 100 CAPSULE, LIQUID FILLED ORAL at 19:37

## 2022-02-02 RX ADMIN — IBUPROFEN 600 MG: 600 TABLET ORAL at 14:01

## 2022-02-02 RX ADMIN — SIMETHICONE 80 MG: 80 TABLET, CHEWABLE ORAL at 19:37

## 2022-02-02 RX ADMIN — SIMETHICONE 80 MG: 80 TABLET, CHEWABLE ORAL at 14:01

## 2022-02-02 RX ADMIN — PRENATAL VITAMINS-IRON FUMARATE 27 MG IRON-FOLIC ACID 0.8 MG TABLET 1 TABLET: at 10:15

## 2022-02-02 RX ADMIN — ACETAMINOPHEN 1000 MG: 500 TABLET ORAL at 17:54

## 2022-02-02 RX ADMIN — DOCUSATE SODIUM 100 MG: 100 CAPSULE, LIQUID FILLED ORAL at 10:15

## 2022-02-02 RX ADMIN — OXYCODONE 5 MG: 5 TABLET ORAL at 19:37

## 2022-02-02 RX ADMIN — OXYCODONE 5 MG: 5 TABLET ORAL at 14:01

## 2022-02-02 RX ADMIN — Medication 1 PATCH: at 17:54

## 2022-02-02 RX ADMIN — ACETAMINOPHEN 1000 MG: 500 TABLET ORAL at 01:23

## 2022-02-02 RX ADMIN — IBUPROFEN 600 MG: 600 TABLET ORAL at 21:02

## 2022-02-02 NOTE — PLAN OF CARE
Goal Outcome Evaluation:           Progress: improving  Outcome Summary: VSS, lochia has remained scant, sidhu catheter out up to bathroom and voiding without difficulry,  eating regular low fat diet and tolerated,  pain controlled with oral pain medication.

## 2022-02-02 NOTE — PLAN OF CARE
Goal Outcome Evaluation:  Plan of Care Reviewed With: patient        Progress: improving  Outcome Summary: Patient condition and VSS.  Patient FF-1 with scant bleeding.  Patient bonding appropriatley with infant and responding to infant cues.  Patient continues to transition appropriatley with POC to be discharged home tomorrow

## 2022-02-02 NOTE — PROGRESS NOTES
Patient: Comfort Main  Procedure(s):   SECTION REPEAT  Anesthesia type: spinal    Patient location: Labor and Delivery  Last vitals:   Vitals:    22 0900   BP: 112/78   Pulse:    Resp: 18   Temp: 98.6 °F (37 °C)   SpO2: 98%     Level of consciousness: awake, alert and oriented    Post-anesthesia pain: adequate analgesia  Airway patency: patent  Respiratory: unassisted  Cardiovascular: stable and blood pressure at baseline  Hydration: euvolemic    Anesthetic complications: no

## 2022-02-02 NOTE — PROGRESS NOTES
GER[facility]   PROGRESS NOTE    Post-Op Day 1 S/P   Subjective   Subjective  Patient reports:  Pain is well controlled with prescribed pain medication.  She is ambulating without difficulty. Tolerating diet. .  tolerating po solids.   Voiding - without difficulty; flatus reported..  Vaginal bleeding is as much as expected.    Objective    Objective     Vitals: Vital Signs Range for the last 24 hours  Temperature: Temp:  [97.7 °F (36.5 °C)-98.5 °F (36.9 °C)] 98 °F (36.7 °C)   Temp Source: Temp src: Oral   BP: BP: (104-132)/(54-91) 107/75   Pulse: Heart Rate:  [62-80] 72   Respirations: Resp:  [11-25] 18   SPO2: SpO2:  [96 %-100 %] 98 %   O2 Amount (l/min):     O2 Devices Device (Oxygen Therapy): room air   Weight:              Physical Exam    Lungs clear to auscultation bilaterally   Abdomen Soft, non-tender, normal bowel sounds;     Incision  dressed   Extremities 1 plus lower extremity edema, no tenderness legs or calves     I reviewed the patient's new clinical results.    Assessment/Plan        Third trimester pregnancy    Pregnancy    Assessment & Plan    Assessment:    Comfort Main is Day 1  post-partum  , Low Transverse   .      Plan:  continue post op care.      Debra Bahena PA-C  22  08:47 EST

## 2022-02-03 VITALS
WEIGHT: 275 LBS | TEMPERATURE: 98.6 F | HEART RATE: 82 BPM | SYSTOLIC BLOOD PRESSURE: 106 MMHG | OXYGEN SATURATION: 97 % | HEIGHT: 69 IN | RESPIRATION RATE: 18 BRPM | DIASTOLIC BLOOD PRESSURE: 71 MMHG | BODY MASS INDEX: 40.73 KG/M2

## 2022-02-03 PROBLEM — Z98.891 S/P CESAREAN SECTION: Status: ACTIVE | Noted: 2022-02-03

## 2022-02-03 PROCEDURE — 99238 HOSP IP/OBS DSCHRG MGMT 30/<: CPT | Performed by: MIDWIFE

## 2022-02-03 RX ORDER — OXYCODONE HYDROCHLORIDE 5 MG/1
5 TABLET ORAL EVERY 4 HOURS PRN
Qty: 15 TABLET | Refills: 0 | Status: SHIPPED | OUTPATIENT
Start: 2022-02-03 | End: 2022-02-03

## 2022-02-03 RX ORDER — ACETAMINOPHEN 500 MG
1000 TABLET ORAL EVERY 8 HOURS PRN
Qty: 60 TABLET | Refills: 0 | Status: SHIPPED | OUTPATIENT
Start: 2022-02-03

## 2022-02-03 RX ORDER — IBUPROFEN 600 MG/1
600 TABLET ORAL EVERY 8 HOURS PRN
Qty: 60 TABLET | Refills: 0 | Status: SHIPPED | OUTPATIENT
Start: 2022-02-03

## 2022-02-03 RX ORDER — ACETAMINOPHEN 500 MG
1000 TABLET ORAL EVERY 8 HOURS PRN
Qty: 60 TABLET | Refills: 0 | Status: SHIPPED | OUTPATIENT
Start: 2022-02-03 | End: 2022-02-03

## 2022-02-03 RX ORDER — OXYCODONE HYDROCHLORIDE 5 MG/1
5 TABLET ORAL EVERY 4 HOURS PRN
Qty: 15 TABLET | Refills: 0 | Status: SHIPPED | OUTPATIENT
Start: 2022-02-03 | End: 2022-02-08

## 2022-02-03 RX ORDER — IBUPROFEN 600 MG/1
600 TABLET ORAL EVERY 8 HOURS PRN
Qty: 60 TABLET | Refills: 0 | Status: SHIPPED | OUTPATIENT
Start: 2022-02-03 | End: 2022-02-03

## 2022-02-03 RX ORDER — PSEUDOEPHEDRINE HCL 30 MG
100 TABLET ORAL 2 TIMES DAILY PRN
Qty: 30 CAPSULE | Refills: 0 | Status: SHIPPED | OUTPATIENT
Start: 2022-02-03

## 2022-02-03 RX ORDER — PSEUDOEPHEDRINE HCL 30 MG
100 TABLET ORAL 2 TIMES DAILY PRN
Qty: 30 CAPSULE | Refills: 0 | Status: SHIPPED | OUTPATIENT
Start: 2022-02-03 | End: 2022-02-03

## 2022-02-03 RX ADMIN — OXYCODONE 5 MG: 5 TABLET ORAL at 01:44

## 2022-02-03 RX ADMIN — SIMETHICONE 80 MG: 80 TABLET, CHEWABLE ORAL at 08:28

## 2022-02-03 RX ADMIN — ACETAMINOPHEN 1000 MG: 500 TABLET ORAL at 01:40

## 2022-02-03 RX ADMIN — PRENATAL VITAMINS-IRON FUMARATE 27 MG IRON-FOLIC ACID 0.8 MG TABLET 1 TABLET: at 08:28

## 2022-02-03 RX ADMIN — ACETAMINOPHEN 1000 MG: 500 TABLET ORAL at 10:43

## 2022-02-03 RX ADMIN — IBUPROFEN 600 MG: 600 TABLET ORAL at 05:43

## 2022-02-03 RX ADMIN — OXYCODONE 5 MG: 5 TABLET ORAL at 10:43

## 2022-02-03 RX ADMIN — DOCUSATE SODIUM 100 MG: 100 CAPSULE, LIQUID FILLED ORAL at 08:28

## 2022-02-03 NOTE — DISCHARGE SUMMARY
Discharge Summary     Anoop Main  : 1990  MRN: [id number]  [facility]: [id number]    Date of Admission: 2022   Date of Discharge:  2/3/2022   Delivering Physician: Johny Craven MD       Admission Diagnosis: Third trimester pregnancy [Z34.93]  Pregnancy [Z34.90]   Discharge Diagnosis: 1. Same as above plus  2. Pregnancy at 39w3d - delivered       Procedures: 1. Repeat  (LTCS)     Hospital Course  See the completed operative report for details regarding antepartum course and delivery.    Her post-operative course was unremarkable.  On POD # 2 she felt like she was ready for D/C.  She was evaluated and it was determined she was able to be discharged to home.  She had no febrile morbidity. She had normal bowel and bladder function and was hemodynamically stable.  Her wound was healing well without obvious signs of infections. She is breast feeding  and it is going well.    Infant  male  fetus weighing 3827 g (8 lb 7 oz)   Apgars -  8 @ 1 minute /  9 @ 5 minutes.    Discharge labs  Lab Results   Component Value Date    WBC 9.56 2022    HGB 9.8 (L) 2022    HCT 30.7 (L) 2022     2022       Discharge Medications     Discharge Medications      New Medications      Instructions Start Date   acetaminophen 500 MG tablet  Commonly known as: TYLENOL   1,000 mg, Oral, Every 8 Hours PRN      docusate sodium 100 MG capsule   100 mg, Oral, 2 Times Daily PRN      ibuprofen 600 MG tablet  Commonly known as: ADVIL,MOTRIN   600 mg, Oral, Every 8 Hours PRN      oxyCODONE 5 MG immediate release tablet  Commonly known as: ROXICODONE   5 mg, Oral, Every 4 Hours PRN         Continue These Medications      Instructions Start Date   ferrous sulfate 325 (65 FE) MG tablet   325 mg, Oral, 2 Times Daily Before Meals      Prenatal 27-1 27-1 MG tablet tablet   1 tablet, Oral, Daily             Discharge Disposition Home or Self Care   Condition on Discharge: good    Follow-up: 1 week with [facility].     Time spent on discharge: 30 minutes or less  Suzy Lebron, APRN  2/3/2022

## 2022-02-03 NOTE — PLAN OF CARE
Goal Outcome Evaluation:  Plan of Care Reviewed With: patient        Progress: improving  Outcome Summary: VSS, I & O adequate, pain constrolled with oral pain medication. breastfeeding well. Positive bonding observed. ready for discharge.

## 2022-02-03 NOTE — CASE MANAGEMENT/SOCIAL WORK
Case Management/Social Work    Patient Name:  Comfort Main  YOB: 1990  MRN: [id number]  Admit Date:  2/1/2022    Social Service Consult Due to [place] Score of 19    SW met with mother, baby, and FOB at bedside due to a social service consult regarding [place] Score. Mother gave permission for SW to speak in front of FOB. SW explained reason for consult and SW role. Mother states that she has one year old twins at home. SW and mother dicussed [facility] services. Contact information or the clinic was provided to mother. SW presented information on PPD. Mother reported having a understanding of PPD. SW encouraged mother to contact her doctor if she feels like she is experiencing any warning signs and/or symptoms of PPD. SW also encouraged FOB to read the information on PPD to become familiar with the warning signs and/or symptoms of PPD. Mother reports having a strong support system through FOB and family/friends. SW provided mother with a list of mental health crisis lines and local counseling agencies. SW provided mother with information on HANDS and WIC. Mother reports that she will be breast feeding and already has a breast pump. No other SW needs identified at this time. SW to continue to follow and assist as needed.      Electronically signed by:  AJAY Lutz  02/03/22 11:50 EST

## 2022-02-08 ENCOUNTER — OFFICE VISIT (OUTPATIENT)
Dept: OBSTETRICS AND GYNECOLOGY | Facility: CLINIC | Age: 32
End: 2022-02-08

## 2022-02-08 VITALS
WEIGHT: 278 LBS | DIASTOLIC BLOOD PRESSURE: 76 MMHG | SYSTOLIC BLOOD PRESSURE: 122 MMHG | HEIGHT: 69 IN | BODY MASS INDEX: 41.18 KG/M2

## 2022-02-08 DIAGNOSIS — Z98.891 STATUS POST CESAREAN SECTION ROUTINE POSTPARTUM FOLLOW-UP: Primary | ICD-10-CM

## 2022-02-08 PROCEDURE — 0503F POSTPARTUM CARE VISIT: CPT | Performed by: PHYSICIAN ASSISTANT

## 2022-02-08 NOTE — PROGRESS NOTES
Subjective   Chief Complaint   Patient presents with   • Post-op     One week post-op repeat C/S, doing well       Comfort Main is a 31 y.o. year old  presenting to be seen for post op visit  Doing well one week post op repeat  section  Normal bowel and bladder function  Lochia light no issues with postpartum blues  Breastfeeding. Baby doing well     Past Medical History:   Diagnosis Date   • Depression     undiagnosed   • Multiple gestation 2020    twins   • Urinary tract infection         Current Outpatient Medications:   •  acetaminophen (TYLENOL) 500 MG tablet, Take 2 tablets by mouth Every 8 (Eight) Hours As Needed for Mild Pain ., Disp: 60 tablet, Rfl: 0  •  docusate sodium 100 MG capsule, Take 1 capsule by mouth 2 (Two) Times a Day As Needed (constipation)., Disp: 30 capsule, Rfl: 0  •  ferrous sulfate 325 (65 FE) MG tablet, Take 1 tablet by mouth 2 (Two) Times a Day Before Meals., Disp: 60 tablet, Rfl: 10  •  ibuprofen (ADVIL,MOTRIN) 600 MG tablet, Take 1 tablet by mouth Every 8 (Eight) Hours As Needed for Mild Pain ., Disp: 60 tablet, Rfl: 0  •  oxyCODONE (ROXICODONE) 5 MG immediate release tablet, Take 1 tablet by mouth Every 4 (Four) Hours As Needed for Moderate Pain  for up to 5 days., Disp: 15 tablet, Rfl: 0  •  Prenatal Vit-Fe Fumarate-FA (PRENATAL 27-) 27-1 MG tablet tablet, Take 1 tablet by mouth Daily., Disp: , Rfl:    No Known Allergies   Past Surgical History:   Procedure Laterality Date   •  SECTION N/A 2020    Procedure:  SECTION PRIMARY;  Surgeon: Mahesh Ramon MD;  Location: [facility] LABOR DELIVERY;  Service: Obstetrics/Gynecology;  Laterality: N/A;   •  SECTION WITH TUBAL N/A 2022   • FOOT SURGERY Right       Social History     Socioeconomic History   • Marital status: Single   • Number of children: 4   • Highest education level: Associate degree: academic program   Tobacco Use   • Smoking status: Current Every Day  "Smoker     Packs/day: 0.50     Years: 8.00     Pack years: 4.00     Types: Cigarettes   • Smokeless tobacco: Never Used   Vaping Use   • Vaping Use: Never used   Substance and Sexual Activity   • Alcohol use: Not Currently     Comment: SOC   • Drug use: Not Currently     Types: Marijuana     Comment: quit DUE TO PREG   • Sexual activity: Yes     Partners: Female, Male      Family History   Problem Relation Age of Onset   • Hypertension Maternal Grandmother    • Diabetes Maternal Grandmother    • Hypertension Mother        Review of Systems   Constitutional: Negative for chills, diaphoresis and fever.   Gastrointestinal: Negative for constipation, diarrhea, nausea and vomiting.   Genitourinary: Negative for difficulty urinating and dysuria.           Objective   /76   Ht 175.3 cm (69\")   Wt 126 kg (278 lb)   LMP 2021   Breastfeeding Yes   BMI 41.05 kg/m²     Physical Exam  Constitutional:       Appearance: Normal appearance. She is well-developed and well-groomed.   Eyes:      General: Lids are normal.      Extraocular Movements: Extraocular movements intact.      Conjunctiva/sclera: Conjunctivae normal.   Abdominal:      Palpations: Abdomen is soft.      Tenderness: There is no abdominal tenderness.      Comments: incision healing well   Skin:     General: Skin is warm and dry.      Findings: No bruising or lesion.   Neurological:      Mental Status: She is alert.   Psychiatric:         Attention and Perception: Attention normal.         Mood and Affect: Mood normal.         Speech: Speech normal.         Behavior: Behavior is cooperative.            Result Review :                   Assessment and Plan  Diagnoses and all orders for this visit:    1. Status post  section routine postpartum follow-up (Primary)      Patient Instructions   Follow up 5 weeks or prn             This note was electronically signed.    Debra Bahena PA-C   2022  "

## 2023-05-01 ENCOUNTER — OFFICE VISIT (OUTPATIENT)
Dept: FAMILY MEDICINE CLINIC | Facility: CLINIC | Age: 33
End: 2023-05-01
Payer: COMMERCIAL

## 2023-05-01 VITALS
SYSTOLIC BLOOD PRESSURE: 144 MMHG | HEART RATE: 84 BPM | HEIGHT: 69 IN | TEMPERATURE: 98.7 F | BODY MASS INDEX: 39.43 KG/M2 | WEIGHT: 266.2 LBS | OXYGEN SATURATION: 97 % | DIASTOLIC BLOOD PRESSURE: 106 MMHG

## 2023-05-01 DIAGNOSIS — F41.1 GAD (GENERALIZED ANXIETY DISORDER): ICD-10-CM

## 2023-05-01 DIAGNOSIS — F33.1 MODERATE EPISODE OF RECURRENT MAJOR DEPRESSIVE DISORDER: Primary | ICD-10-CM

## 2023-05-01 DIAGNOSIS — R03.0 ELEVATED BLOOD PRESSURE READING IN OFFICE WITHOUT DIAGNOSIS OF HYPERTENSION: ICD-10-CM

## 2023-05-01 DIAGNOSIS — G89.29 CHRONIC BILATERAL LOW BACK PAIN WITHOUT SCIATICA: ICD-10-CM

## 2023-05-01 DIAGNOSIS — M54.50 CHRONIC BILATERAL LOW BACK PAIN WITHOUT SCIATICA: ICD-10-CM

## 2023-05-01 RX ORDER — NITROFURANTOIN 25; 75 MG/1; MG/1
CAPSULE ORAL
COMMUNITY
Start: 2023-04-24 | End: 2023-05-01

## 2023-05-01 RX ORDER — DICLOFENAC SODIUM 75 MG/1
75 TABLET, DELAYED RELEASE ORAL 2 TIMES DAILY PRN
Qty: 60 TABLET | Refills: 1 | Status: SHIPPED | OUTPATIENT
Start: 2023-05-01

## 2023-05-01 NOTE — PROGRESS NOTES
New Patient History and Physical      Referring Physician: No ref. provider found    Subjective     Chief Complaint:    Chief Complaint   Patient presents with   • Back Pain     Lower Back pain X 2 months; intermittent gallbladder pain since 2016   • Depression     Patient states that she has been dealing with depression for years, has gotten worse since        History of Present Illness:   Here as a new patient   Has 4 kids, 3 are under 3  She has had depression for years, worse since being in [place]. Has no desire to leave her house, has crying episodes, feels sad and down, sometimes, no motivation, No SI/HI, does under eat.   Has anxiety, worse when she leaves the house, no longer wants to be social. Worries a lot. No recent panic attacks.   She has never taken meds or done counseling.   Her mom is her support  Low back pain, midline, will sometimes get stuck in a certain. Back pain a lot of night, has tried meds, heat, without improvement. Pain is worse with standing in one spot.   Recent UTI, diagnosed at ED  BP elevated today, not when she was pregnant       Review of Systems   Gen- No fevers, chills  CV- No chest pain, palpitations  Resp- No cough, dyspnea  GI- No N/V/D, abd pain  Neuro-No dizziness, headaches    Past Medical History:   Past Medical History:   Diagnosis Date   • Asthma     as a child   • Depression     undiagnosed   • Multiple gestation 2020    twins   • Urinary tract infection        Past Surgical History:  Past Surgical History:   Procedure Laterality Date   •  SECTION N/A 2020    Procedure:  SECTION PRIMARY;  Surgeon: Mahesh Ramon MD;  Location: [facility] LABOR DELIVERY;  Service: Obstetrics/Gynecology;  Laterality: N/A;   •  SECTION WITH TUBAL N/A 2022   • FOOT SURGERY Right        Family History: family history includes Diabetes in her maternal grandmother; Hypertension in her maternal grandmother and mother.    Social History:   "reports that she has been smoking cigarettes. She has a 4.00 pack-year smoking history. She has never used smokeless tobacco. She reports that she does not currently use alcohol. She reports that she does not currently use drugs after having used the following drugs: Marijuana.    Medications:    Current Outpatient Medications:   •  diclofenac (VOLTAREN) 75 MG EC tablet, Take 1 tablet by mouth 2 (Two) Times a Day As Needed (back pain)., Disp: 60 tablet, Rfl: 1  •  sertraline (Zoloft) 50 MG tablet, 1/2 tablet by mouth x 7 days then once daily thereafter, Disp: 25 tablet, Rfl: 0    Allergies:  No Known Allergies    Objective     Vital Signs:   Vitals:    05/01/23 1610   BP: (!) 144/106   BP Location: Right arm   Patient Position: Sitting   Cuff Size: Large Adult   Pulse: 84   Temp: 98.7 °F (37.1 °C)   TempSrc: Oral   SpO2: 97%   Weight: 121 kg (266 lb 3.2 oz)   Height: 175.9 cm (69.25\")     Body mass index is 39.03 kg/m².    Physical Exam:    Physical Exam  Constitutional:       Appearance: She is well-developed.   HENT:      Head: Normocephalic and atraumatic.      Right Ear: Tympanic membrane, ear canal and external ear normal.      Left Ear: Tympanic membrane, ear canal and external ear normal.      Nose: Nose normal.      Mouth/Throat:      Pharynx: Uvula midline.   Eyes:      Pupils: Pupils are equal, round, and reactive to light.   Cardiovascular:      Rate and Rhythm: Normal rate and regular rhythm.      Heart sounds: Normal heart sounds. No murmur heard.    No friction rub. No gallop.   Pulmonary:      Effort: Pulmonary effort is normal.      Breath sounds: Normal breath sounds.   Abdominal:      General: Bowel sounds are normal.      Palpations: Abdomen is soft.      Tenderness: There is no abdominal tenderness.   Musculoskeletal:      Cervical back: Neck supple.      Thoracic back: No tenderness.      Lumbar back: Tenderness present. Positive right straight leg raise test. Negative left straight leg raise " test.   Lymphadenopathy:      Head:      Right side of head: No submental, submandibular, tonsillar, preauricular or posterior auricular adenopathy.      Left side of head: No submental, submandibular, tonsillar, preauricular or posterior auricular adenopathy.      Cervical: No cervical adenopathy.   Skin:     General: Skin is warm and dry.   Neurological:      General: No focal deficit present.      Mental Status: She is alert and oriented to person, place, and time.   Psychiatric:         Mood and Affect: Mood normal.         Behavior: Behavior normal.         Assessment / Plan     Assessment/Plan:   Problem List Items Addressed This Visit    None  Visit Diagnoses     Moderate episode of recurrent major depressive disorder    -  Primary    Relevant Medications    sertraline (Zoloft) 50 MG tablet    Other Relevant Orders    Ambulatory Referral to Behavioral Health    Chronic bilateral low back pain without sciatica        Relevant Medications    diclofenac (VOLTAREN) 75 MG EC tablet    Other Relevant Orders    XR Spine Lumbar Complete With Flex & Ext    Ambulatory Referral to Physical Therapy Evaluate and treat    DEISY (generalized anxiety disorder)        Relevant Medications    sertraline (Zoloft) 50 MG tablet    Other Relevant Orders    Ambulatory Referral to Behavioral Health    Elevated blood pressure reading in office without diagnosis of hypertension            -- zoloft for depression and anxiety, counseling,   - Medication discussed, advised to go to ED for any SI/HI, encouraged counseling/CBT, encouraged clean eating and exercise  - labs next visit  - BP elevated, monitor    Discussed plan of care in detail with pt today; pt verb understanding and agrees.    I have reviewed pertinent health maintenance applicable to this patient.    Follow up:  4 weeks    Electronically signed by CASSIUS Salgado   05/01/2023 16:21 EDT      Please note that portions of this note were completed with a voice  recognition program.

## 2023-09-05 ENCOUNTER — APPOINTMENT (OUTPATIENT)
Dept: CT IMAGING | Facility: HOSPITAL | Age: 33
End: 2023-09-05
Payer: COMMERCIAL

## 2023-09-05 ENCOUNTER — HOSPITAL ENCOUNTER (EMERGENCY)
Facility: HOSPITAL | Age: 33
Discharge: HOME | End: 2023-09-05
Attending: EMERGENCY MEDICINE
Payer: COMMERCIAL

## 2023-09-05 VITALS
WEIGHT: 270 LBS | HEART RATE: 89 BPM | SYSTOLIC BLOOD PRESSURE: 138 MMHG | TEMPERATURE: 98.5 F | HEIGHT: 69 IN | DIASTOLIC BLOOD PRESSURE: 94 MMHG | RESPIRATION RATE: 18 BRPM | OXYGEN SATURATION: 97 % | BODY MASS INDEX: 39.99 KG/M2

## 2023-09-05 DIAGNOSIS — K81.1 CHRONIC CHOLECYSTITIS: Primary | ICD-10-CM

## 2023-09-05 LAB
ALBUMIN SERPL-MCNC: 4.9 G/DL (ref 3.5–5.2)
ALBUMIN/GLOB SERPL: 1.8 G/DL
ALP SERPL-CCNC: 107 U/L (ref 39–117)
ALT SERPL W P-5'-P-CCNC: 36 U/L (ref 1–33)
ANION GAP SERPL CALCULATED.3IONS-SCNC: 13.8 MMOL/L (ref 5–15)
AST SERPL-CCNC: 12 U/L (ref 1–32)
BACTERIA UR QL AUTO: ABNORMAL /HPF
BASOPHILS # BLD AUTO: 0.03 10*3/MM3 (ref 0–0.2)
BASOPHILS NFR BLD AUTO: 0.2 % (ref 0–1.5)
BILIRUB SERPL-MCNC: 0.4 MG/DL (ref 0–1.2)
BILIRUB UR QL STRIP: NEGATIVE
BUN SERPL-MCNC: 8 MG/DL (ref 6–20)
BUN/CREAT SERPL: 13.8 (ref 7–25)
CALCIUM SPEC-SCNC: 9.7 MG/DL (ref 8.6–10.5)
CHLORIDE SERPL-SCNC: 104 MMOL/L (ref 98–107)
CLARITY UR: ABNORMAL
CO2 SERPL-SCNC: 22.2 MMOL/L (ref 22–29)
COLOR UR: YELLOW
CREAT SERPL-MCNC: 0.58 MG/DL (ref 0.57–1)
D-LACTATE SERPL-SCNC: 1.5 MMOL/L (ref 0.5–2)
DEPRECATED RDW RBC AUTO: 44 FL (ref 37–54)
EGFRCR SERPLBLD CKD-EPI 2021: 125 ML/MIN/1.73
EOSINOPHIL # BLD AUTO: 0.03 10*3/MM3 (ref 0–0.4)
EOSINOPHIL NFR BLD AUTO: 0.2 % (ref 0.3–6.2)
ERYTHROCYTE [DISTWIDTH] IN BLOOD BY AUTOMATED COUNT: 14.2 % (ref 12.3–15.4)
GLOBULIN UR ELPH-MCNC: 2.7 GM/DL
GLUCOSE SERPL-MCNC: 115 MG/DL (ref 65–99)
GLUCOSE UR STRIP-MCNC: NEGATIVE MG/DL
HCT VFR BLD AUTO: 42.5 % (ref 34–46.6)
HGB BLD-MCNC: 13.8 G/DL (ref 12–15.9)
HGB UR QL STRIP.AUTO: NEGATIVE
HOLD SPECIMEN: NORMAL
HOLD SPECIMEN: NORMAL
HYALINE CASTS UR QL AUTO: ABNORMAL /LPF
IMM GRANULOCYTES # BLD AUTO: 0.07 10*3/MM3 (ref 0–0.05)
IMM GRANULOCYTES NFR BLD AUTO: 0.5 % (ref 0–0.5)
KETONES UR QL STRIP: ABNORMAL
LEUKOCYTE ESTERASE UR QL STRIP.AUTO: ABNORMAL
LIPASE SERPL-CCNC: 29 U/L (ref 13–60)
LYMPHOCYTES # BLD AUTO: 2.08 10*3/MM3 (ref 0.7–3.1)
LYMPHOCYTES NFR BLD AUTO: 15 % (ref 19.6–45.3)
MCH RBC QN AUTO: 27.9 PG (ref 26.6–33)
MCHC RBC AUTO-ENTMCNC: 32.5 G/DL (ref 31.5–35.7)
MCV RBC AUTO: 85.9 FL (ref 79–97)
MONOCYTES # BLD AUTO: 0.97 10*3/MM3 (ref 0.1–0.9)
MONOCYTES NFR BLD AUTO: 7 % (ref 5–12)
MUCOUS THREADS URNS QL MICRO: ABNORMAL /HPF
NEUTROPHILS NFR BLD AUTO: 10.71 10*3/MM3 (ref 1.7–7)
NEUTROPHILS NFR BLD AUTO: 77.1 % (ref 42.7–76)
NITRITE UR QL STRIP: NEGATIVE
NRBC BLD AUTO-RTO: 0 /100 WBC (ref 0–0.2)
PH UR STRIP.AUTO: 6 [PH] (ref 5–8)
PLATELET # BLD AUTO: 331 10*3/MM3 (ref 140–450)
PMV BLD AUTO: 10.4 FL (ref 6–12)
POTASSIUM SERPL-SCNC: 3.8 MMOL/L (ref 3.5–5.2)
PROCALCITONIN SERPL-MCNC: 0.02 NG/ML (ref 0–0.25)
PROT SERPL-MCNC: 7.6 G/DL (ref 6–8.5)
PROT UR QL STRIP: ABNORMAL
RBC # BLD AUTO: 4.95 10*6/MM3 (ref 3.77–5.28)
RBC # UR STRIP: ABNORMAL /HPF
REF LAB TEST METHOD: ABNORMAL
SODIUM SERPL-SCNC: 140 MMOL/L (ref 136–145)
SP GR UR STRIP: 1.02 (ref 1–1.03)
SQUAMOUS #/AREA URNS HPF: ABNORMAL /HPF
TROPONIN T SERPL HS-MCNC: <6 NG/L
UROBILINOGEN UR QL STRIP: ABNORMAL
WBC # UR STRIP: ABNORMAL /HPF
WBC NRBC COR # BLD: 13.89 10*3/MM3 (ref 3.4–10.8)
WHOLE BLOOD HOLD COAG: NORMAL
WHOLE BLOOD HOLD SPECIMEN: NORMAL

## 2023-09-05 PROCEDURE — 80053 COMPREHEN METABOLIC PANEL: CPT

## 2023-09-05 PROCEDURE — 81001 URINALYSIS AUTO W/SCOPE: CPT

## 2023-09-05 PROCEDURE — 25010000002 MORPHINE PER 10 MG: Performed by: EMERGENCY MEDICINE

## 2023-09-05 PROCEDURE — 84484 ASSAY OF TROPONIN QUANT: CPT

## 2023-09-05 PROCEDURE — 99284 EMERGENCY DEPT VISIT MOD MDM: CPT

## 2023-09-05 PROCEDURE — 96375 TX/PRO/DX INJ NEW DRUG ADDON: CPT

## 2023-09-05 PROCEDURE — 83690 ASSAY OF LIPASE: CPT

## 2023-09-05 PROCEDURE — 84145 PROCALCITONIN (PCT): CPT | Performed by: NURSE PRACTITIONER

## 2023-09-05 PROCEDURE — 93005 ELECTROCARDIOGRAM TRACING: CPT

## 2023-09-05 PROCEDURE — 74176 CT ABD & PELVIS W/O CONTRAST: CPT

## 2023-09-05 PROCEDURE — 93005 ELECTROCARDIOGRAM TRACING: CPT | Performed by: EMERGENCY MEDICINE

## 2023-09-05 PROCEDURE — 96374 THER/PROPH/DIAG INJ IV PUSH: CPT

## 2023-09-05 PROCEDURE — 25010000002 KETOROLAC TROMETHAMINE PER 15 MG: Performed by: NURSE PRACTITIONER

## 2023-09-05 PROCEDURE — 83605 ASSAY OF LACTIC ACID: CPT | Performed by: NURSE PRACTITIONER

## 2023-09-05 PROCEDURE — 85025 COMPLETE CBC W/AUTO DIFF WBC: CPT

## 2023-09-05 RX ORDER — MORPHINE SULFATE 2 MG/ML
2 INJECTION, SOLUTION INTRAMUSCULAR; INTRAVENOUS ONCE
Status: COMPLETED | OUTPATIENT
Start: 2023-09-05 | End: 2023-09-05

## 2023-09-05 RX ORDER — HYDROCODONE BITARTRATE AND ACETAMINOPHEN 5; 325 MG/1; MG/1
1 TABLET ORAL EVERY 6 HOURS PRN
Qty: 12 TABLET | Refills: 0 | Status: CANCELLED | OUTPATIENT
Start: 2023-09-05 | End: 2023-09-08

## 2023-09-05 RX ORDER — KETOROLAC TROMETHAMINE 30 MG/ML
30 INJECTION, SOLUTION INTRAMUSCULAR; INTRAVENOUS ONCE
Status: COMPLETED | OUTPATIENT
Start: 2023-09-05 | End: 2023-09-05

## 2023-09-05 RX ORDER — SODIUM CHLORIDE 0.9 % (FLUSH) 0.9 %
10 SYRINGE (ML) INJECTION AS NEEDED
Status: DISCONTINUED | OUTPATIENT
Start: 2023-09-05 | End: 2023-09-05 | Stop reason: HOSPADM

## 2023-09-05 RX ADMIN — KETOROLAC TROMETHAMINE 30 MG: 30 INJECTION, SOLUTION INTRAMUSCULAR; INTRAVENOUS at 13:52

## 2023-09-05 RX ADMIN — MORPHINE SULFATE 2 MG: 2 INJECTION, SOLUTION INTRAMUSCULAR; INTRAVENOUS at 14:36

## 2023-09-05 NOTE — ED NOTES
Called lab regarding lactic, grey top sent with original labs. Wali stated that he would collect and start

## 2023-09-05 NOTE — ED PROVIDER NOTES
Subjective:  History of Present Illness:    Patient is a 32-year-old female without contributing history.  She presents to the ER today for acute on chronic right upper quadrant abdominal pain that radiates into the back.  Patient reports that she has had issues with her gallbladder in the past.  She has not had time to see surgery to have gallbladder taken out.  She denies fever.  Denies OTC medications or home remedy.  Denies alleviating or exacerbating factors.    Nurses Notes reviewed and agree, including vitals, allergies, social history and prior medical history.     REVIEW OF SYSTEMS: All systems reviewed and not pertinent unless noted.  Review of Systems   Gastrointestinal:  Positive for abdominal pain.   All other systems reviewed and are negative.    Past Medical History:   Diagnosis Date    Asthma     as a child    Depression     undiagnosed    Multiple gestation 2020    twins    Urinary tract infection        Allergies:    Patient has no known allergies.      Past Surgical History:   Procedure Laterality Date     SECTION N/A 2020    Procedure:  SECTION PRIMARY;  Surgeon: Mahesh Ramon MD;  Location: [facility] LABOR DELIVERY;  Service: Obstetrics/Gynecology;  Laterality: N/A;     SECTION WITH TUBAL N/A 2022    FOOT SURGERY Right          Social History     Socioeconomic History    Marital status: Single    Number of children: 4    Highest education level: Associate degree: academic program   Tobacco Use    Smoking status: Every Day     Packs/day: 0.50     Years: 8.00     Pack years: 4.00     Types: Cigarettes    Smokeless tobacco: Never   Vaping Use    Vaping Use: Never used   Substance and Sexual Activity    Alcohol use: Not Currently     Comment: SOC    Drug use: Yes     Frequency: 7.0 times per week     Types: Marijuana    Sexual activity: Yes     Partners: Female, Male         Family History   Problem Relation Age of Onset    Hypertension Maternal  "Grandmother     Diabetes Maternal Grandmother     Hypertension Mother        Objective  Physical Exam:  /89   Pulse 82   Temp 98.5 °F (36.9 °C) (Oral)   Resp 19   Ht 175.3 cm (69\")   Wt 122 kg (270 lb)   LMP 08/15/2023   SpO2 99%   BMI 39.87 kg/m²      Physical Exam  Vitals and nursing note reviewed.   Constitutional:       Appearance: She is well-developed and normal weight.   HENT:      Head: Normocephalic and atraumatic.   Eyes:      Extraocular Movements: Extraocular movements intact.      Pupils: Pupils are equal, round, and reactive to light.   Cardiovascular:      Rate and Rhythm: Normal rate and regular rhythm.   Pulmonary:      Effort: Pulmonary effort is normal.      Breath sounds: Normal breath sounds.   Abdominal:      General: Abdomen is flat. Bowel sounds are normal.      Palpations: Abdomen is soft.   Skin:     General: Skin is warm and dry.      Capillary Refill: Capillary refill takes less than 2 seconds.   Neurological:      General: No focal deficit present.      Mental Status: She is alert and oriented to person, place, and time.   Psychiatric:         Mood and Affect: Mood normal.         Behavior: Behavior normal.       Procedures    ED Course:    ED Course as of 09/05/23 1442   Tue Sep 05, 2023   1155 EKG interpreted by me reveals sinus arrhythmia rate of 60 and no ischemic changes, [PF]      ED Course User Index  [PF] Lefty Zelaya,        Lab Results (last 24 hours)       Procedure Component Value Units Date/Time    CBC & Differential [[id number]]  (Abnormal) Collected: 09/05/23 1128    Specimen: Blood Updated: 09/05/23 1138    Narrative:      The following orders were created for panel order CBC & Differential.  Procedure                               Abnormality         Status                     ---------                               -----------         ------                     CBC Auto Differential[[id number]]        Abnormal            Final result             "     Please view results for these tests on the individual orders.    Comprehensive Metabolic Panel [[id number]]  (Abnormal) Collected: 09/05/23 1128    Specimen: Blood Updated: 09/05/23 1158     Glucose 115 mg/dL      BUN 8 mg/dL      Creatinine 0.58 mg/dL      Sodium 140 mmol/L      Potassium 3.8 mmol/L      Chloride 104 mmol/L      CO2 22.2 mmol/L      Calcium 9.7 mg/dL      Total Protein 7.6 g/dL      Albumin 4.9 g/dL      ALT (SGPT) 36 U/L      AST (SGOT) 12 U/L      Alkaline Phosphatase 107 U/L      Total Bilirubin 0.4 mg/dL      Globulin 2.7 gm/dL      A/G Ratio 1.8 g/dL      BUN/Creatinine Ratio 13.8     Anion Gap 13.8 mmol/L      eGFR 125.0 mL/min/1.73     Narrative:      GFR Normal >60  Chronic Kidney Disease <60  Kidney Failure <15      Lipase [[id number]]  (Normal) Collected: 09/05/23 1128    Specimen: Blood Updated: 09/05/23 1158     Lipase 29 U/L     Single High Sensitivity Troponin T [[id number]]  (Normal) Collected: 09/05/23 1128    Specimen: Blood Updated: 09/05/23 1200     HS Troponin T <6 ng/L     Narrative:      High Sensitive Troponin T Reference Range:  <10.0 ng/L- Negative Female for AMI  <15.0 ng/L- Negative Male for AMI  >=10 - Abnormal Female indicating possible myocardial injury.  >=15 - Abnormal Male indicating possible myocardial injury.   Clinicians would have to utilize clinical acumen, EKG, Troponin, and serial changes to determine if it is an Acute Myocardial Infarction or myocardial injury due to an underlying chronic condition.         CBC Auto Differential [[id number]]  (Abnormal) Collected: 09/05/23 1128    Specimen: Blood Updated: 09/05/23 1138     WBC 13.89 10*3/mm3      RBC 4.95 10*6/mm3      Hemoglobin 13.8 g/dL      Hematocrit 42.5 %      MCV 85.9 fL      MCH 27.9 pg      MCHC 32.5 g/dL      RDW 14.2 %      RDW-SD 44.0 fl      MPV 10.4 fL      Platelets 331 10*3/mm3      Neutrophil % 77.1 %      Lymphocyte % 15.0 %      Monocyte % 7.0 %      Eosinophil % 0.2 %      Basophil % 0.2  "%      Immature Grans % 0.5 %      Neutrophils, Absolute 10.71 10*3/mm3      Lymphocytes, Absolute 2.08 10*3/mm3      Monocytes, Absolute 0.97 10*3/mm3      Eosinophils, Absolute 0.03 10*3/mm3      Basophils, Absolute 0.03 10*3/mm3      Immature Grans, Absolute 0.07 10*3/mm3      nRBC 0.0 /100 WBC     Procalcitonin [[id number]]  (Normal) Collected: 09/05/23 1128    Specimen: Blood Updated: 09/05/23 1343     Procalcitonin 0.02 ng/mL     Narrative:      As a Marker for Sepsis (Non-Neonates):    1. <0.5 ng/mL represents a low risk of severe sepsis and/or septic shock.  2. >2 ng/mL represents a high risk of severe sepsis and/or septic shock.    As a Marker for Lower Respiratory Tract Infections that require antibiotic therapy:    PCT on Admission    Antibiotic Therapy       6-12 Hrs later    >0.5                Strongly Recommended  >0.25 - <0.5        Recommended   0.1 - 0.25          Discouraged              Remeasure/reassess PCT  <0.1                Strongly Discouraged     Remeasure/reassess PCT    As 28 day mortality risk marker: \"Change in Procalcitonin Result\" (>80% or <=80%) if Day 0 (or Day 1) and Day 4 values are available. Refer to [email or web address]-[email or web address]    Change in PCT <=80%  A decrease of PCT levels below or equal to 80% defines a positive change in PCT test result representing a higher risk for 28-day all-cause mortality of patients diagnosed with severe sepsis for septic shock.    Change in PCT >80%  A decrease of PCT levels of more than 80% defines a negative change in PCT result representing a lower risk for 28-day all-cause mortality of patients diagnosed with severe sepsis or septic shock.       Lactic Acid, Plasma [[id number]]  (Normal) Collected: 09/05/23 1130    Specimen: Blood Updated: 09/05/23 1333     Lactate 1.5 mmol/L     Urinalysis With Microscopic If Indicated (No Culture) - Urine, Clean Catch [[id number]]  (Abnormal) Collected: 09/05/23 1148    Specimen: Urine, Clean Catch " Updated: 09/05/23 1200     Color, UA Yellow     Appearance, UA Cloudy     pH, UA 6.0     Specific Gravity, UA 1.025     Glucose, UA Negative     Ketones, UA 80 mg/dL (3+)     Bilirubin, UA Negative     Blood, UA Negative     Protein, UA >=300 mg/dL (3+)     Leuk Esterase, UA Trace     Nitrite, UA Negative     Urobilinogen, UA 1.0 E.U./dL    Urinalysis, Microscopic Only - Urine, Clean Catch [[id number]]  (Abnormal) Collected: 09/05/23 1148    Specimen: Urine, Clean Catch Updated: 09/05/23 1207     RBC, UA None Seen /HPF      WBC, UA 3-5 /HPF      Bacteria, UA 1+ /HPF      Squamous Epithelial Cells, UA 7-12 /HPF      Hyaline Casts, UA None Seen /LPF      Mucus, UA Small/1+ /HPF      Methodology Manual Light Microscopy             CT Abdomen Pelvis Without Contrast    Result Date: 9/5/2023  CT SCAN OF THE ABDOMEN AND PELVIS WITHOUT CONTRAST     9/5/2023 2:34 PM  HISTORY: ruq abd pain Abdominal pain.  PROCEDURE: Axial CT images were obtained from the lung bases to the pubic symphysis without IV contrast. Reformatted images were generated from the axial data set and provided for interpretation. This study was performed with techniques to keep radiation doses as low as reasonably achievable (ALARA). Individualized dose reduction techniques using automated exposure control or adjustment of mA and/or kV according to the patient size were employed.   COMPARISON: None.  FINDINGS: Lack of IV contrast limits evaluation of the abdominal and pelvic solid organs.  LOWER CHEST: The heart is normal in size. Lung bases are clear.  ABDOMEN/PELVIS: Liver, gallbladder and bile ducts: Unenhanced liver is grossly unremarkable without suspicious focal abnormality. Distended gallbladder. There are few gallstones filling the entire gallbladder lumen. Hyperdense material within the gallbladder lumen. Mild diffuse gallbladder wall thickening. No surrounding inflammation. No definite biliary ductal dilatation.  Adrenal glands: The adrenal glands  are morphologically unremarkable without suspicious lesion.  Kidneys, ureters and urinary bladder: No nephrolithiasis. Medullary nephrocalcinosis. No hydronephrosis. Diffuse urinary bladder wall thickening.  Spleen: The spleen is normal in size.  Pancreas: The pancreas is grossly unremarkable.  Gastrointestinal system and mesentery: There is no evidence of bowel obstruction. The appendix is visualized, long, and unremarkable. No significant mesenteric inflammation. Mild diffuse wall thickening of the cecum, ascending colon, transverse colon and proximal descending colon. No surrounding inflammation.  Lymph nodes: No definite pathologically enlarged abdominal or pelvic lymph nodes are present within the limits of a noncontrast enhanced examination.  Vessels: The abdominal aorta is normal in caliber. The inferior vena cava is unremarkable.  Peritoneum: No free intraperitoneal fluid or pneumoperitoneum.  Pelvic viscera: No acute findings. Fibroid uterus with multiple small fibroids, partially calcified fibroids.  Body wall: No acute findings. No significant body wall hernias.  Bones: No acute fracture.      Impression:  1. Cholelithiasis. Distended gallbladder. Diffuse gallbladder wall thickening. No surrounding inflammation. Findings are concerning for chronic cholecystitis. Superimposed early acute component cannot be excluded.  2. Medullary nephrocalcinosis. No hydronephrosis. No stones in the ureters or urinary bladder. Diffuse urinary bladder wall thickening, correlate for possible cystitis.  3. Fibroid uterus.  4. Mild diffuse colonic wall thickening without surrounding inflammatory fat stranding. Possible mild colitis. Correlate clinically.    Images personally reviewed, interpreted and dictated by LE Delgado.         This report was signed and finalized on 9/5/2023 2:08 PM by LE Delgado.          MDM     Amount and/or Complexity of Data Reviewed  Clinical lab tests: reviewed  Tests in the  radiology section of CPT®: reviewed  Tests in the medicine section of CPT®: reviewed        Initial impression of presenting illness: Patient is a 32-year-old female without contributing history.  She presents to the ER today for acute on chronic right upper quadrant abdominal pain that radiates into the back.  Patient reports that she has had issues with her gallbladder in the past.  She has not had time to see surgery to have gallbladder taken out.  She denies fever.  Denies OTC medications or home remedy.  Denies alleviating or exacerbating factors.    DDX: includes but is not limited to: Cholecystitis, pancreatitis, gastritis    Patient arrives stable with vitals interpreted by myself.     Pertinent features from physical exam: Lung sounds are clear bilaterally throughout.  Abdomen soft nontender.  Bowel sounds are normal.  Cardiac sounds are normal..    Initial diagnostic plan: CBC, urinalysis, lactate, procalcitonin, troponin, CMP, CT abdomen pelvis    Results from initial plan were reviewed and interpreted by me revealing CBC is with mild elevation white count.  Urinalysis with some bacteria but patient is asymptomatic.  Lactate, procalcitonin and troponin were all negative.  CMP with mild elevation in ALT and glucose.  CT abdomen pelvis with impression as follows cholelithiasis.  Distended gallbladder.  Diffuse gallbladder wall thickening.  No surrounding inflammation.  Findings are concerning for chronic cholecystitis.  Superimposed early acute component cannot be excluded.  #2 medullary nephrocalcinosis.  No hydronephrosis.  No stones in the ureters or urinary bladder.  Diffuse urinary bladder wall thickening.  Correlate with possible cystitis.  Fibroid uterus.  Mild diffuse chronic wall thickening without surrounding inflammation flat stranding.  Possible mild colitis correlate clinically.    Diagnostic information from other sources: Chart review    Interventions / Re-evaluation: Patient received 30 mg  of Toradol.  Received 2 mg morphine.    Results/clinical rationale were discussed with patient    Consultations/Discussion of results with other physicians: N/A    Disposition plan: Patient is hemodynamically stable nontoxic-appearing and appropriate for discharge.  Will put in for follow-up with surgery.  Follow-up with primary care in 1 week.  -----  SCOTT reviewed by Lefty Zelaya,      Final diagnoses:   Chronic cholecystitis          Michael Morgan, APRN  09/05/23 1447

## 2023-09-05 NOTE — DISCHARGE INSTRUCTIONS
CT abdomen pelvis with the following impression.1.  Cholelithiasis.  Distended gallbladder.  Diffuse gallbladder wall thickening.  No surrounding inflammation.  Findings are concerning for chronic cholecystitis.  Superimposed early acute component cannot be excluded.  #2 medullary nephrocalcinosis.  No hydronephrosis.  No stones in the ureters or urinary bladder.  Diffuse urinary bladder wall thickening.  Correlate for possible cystitis #3.  Thyroid uterus.  #4 mild diffuse chronic wall thickening without surrounding inflammatory fat stranding.  Possible mild colitis correlate clinically.  Please follow-up with surgery for cholecystectomy.  Follow-up with PCP in 1 week.  Follow-up with ER for new or worsening symptoms.

## 2023-09-08 ENCOUNTER — OFFICE VISIT (OUTPATIENT)
Dept: FAMILY MEDICINE CLINIC | Facility: CLINIC | Age: 33
End: 2023-09-08
Payer: COMMERCIAL

## 2023-09-08 ENCOUNTER — HOSPITAL ENCOUNTER (OUTPATIENT)
Facility: HOSPITAL | Age: 33
Discharge: HOME | End: 2023-09-10
Attending: EMERGENCY MEDICINE | Admitting: HOSPITALIST
Payer: COMMERCIAL

## 2023-09-08 ENCOUNTER — APPOINTMENT (OUTPATIENT)
Dept: ULTRASOUND IMAGING | Facility: HOSPITAL | Age: 33
End: 2023-09-08
Payer: COMMERCIAL

## 2023-09-08 VITALS
HEART RATE: 132 BPM | WEIGHT: 253.8 LBS | DIASTOLIC BLOOD PRESSURE: 100 MMHG | BODY MASS INDEX: 37.59 KG/M2 | HEIGHT: 69 IN | SYSTOLIC BLOOD PRESSURE: 130 MMHG | OXYGEN SATURATION: 97 %

## 2023-09-08 DIAGNOSIS — R10.11 RIGHT UPPER QUADRANT ABDOMINAL PAIN: ICD-10-CM

## 2023-09-08 DIAGNOSIS — K81.0 ACUTE CHOLECYSTITIS: Primary | ICD-10-CM

## 2023-09-08 DIAGNOSIS — K81.9 CHOLECYSTITIS: ICD-10-CM

## 2023-09-08 DIAGNOSIS — R10.11 RUQ PAIN: Primary | ICD-10-CM

## 2023-09-08 DIAGNOSIS — R93.5 ABNORMAL CT OF THE ABDOMEN: ICD-10-CM

## 2023-09-08 DIAGNOSIS — K80.12 CALCULUS OF GALLBLADDER WITH ACUTE ON CHRONIC CHOLECYSTITIS WITHOUT OBSTRUCTION: ICD-10-CM

## 2023-09-08 PROBLEM — E87.6 HYPOKALEMIA: Status: ACTIVE | Noted: 2023-09-08

## 2023-09-08 PROBLEM — F32.A DEPRESSION: Status: ACTIVE | Noted: 2023-09-08

## 2023-09-08 PROBLEM — F12.90 MARIJUANA USE: Status: ACTIVE | Noted: 2023-09-08

## 2023-09-08 PROBLEM — Z72.0 TOBACCO USE: Status: ACTIVE | Noted: 2023-09-08

## 2023-09-08 LAB
ALBUMIN SERPL-MCNC: 4.3 G/DL (ref 3.5–5.2)
ALBUMIN/GLOB SERPL: 1.1 G/DL
ALP SERPL-CCNC: 114 U/L (ref 39–117)
ALT SERPL W P-5'-P-CCNC: 54 U/L (ref 1–33)
ANION GAP SERPL CALCULATED.3IONS-SCNC: 16 MMOL/L (ref 5–15)
AST SERPL-CCNC: 19 U/L (ref 1–32)
B-HCG UR QL: NEGATIVE
BACTERIA UR QL AUTO: ABNORMAL /HPF
BASOPHILS # BLD AUTO: 0.03 10*3/MM3 (ref 0–0.2)
BASOPHILS NFR BLD AUTO: 0.2 % (ref 0–1.5)
BILIRUB SERPL-MCNC: 0.4 MG/DL (ref 0–1.2)
BILIRUB UR QL STRIP: NEGATIVE
BUN SERPL-MCNC: 11 MG/DL (ref 6–20)
BUN/CREAT SERPL: 15.9 (ref 7–25)
CALCIUM SPEC-SCNC: 9.7 MG/DL (ref 8.6–10.5)
CHLORIDE SERPL-SCNC: 101 MMOL/L (ref 98–107)
CLARITY UR: ABNORMAL
CO2 SERPL-SCNC: 25 MMOL/L (ref 22–29)
COLOR UR: YELLOW
CREAT SERPL-MCNC: 0.69 MG/DL (ref 0.57–1)
D-LACTATE SERPL-SCNC: 1.1 MMOL/L (ref 0.5–2)
DEPRECATED RDW RBC AUTO: 43.4 FL (ref 37–54)
EGFRCR SERPLBLD CKD-EPI 2021: 119.9 ML/MIN/1.73
EOSINOPHIL # BLD AUTO: 0.07 10*3/MM3 (ref 0–0.4)
EOSINOPHIL NFR BLD AUTO: 0.6 % (ref 0.3–6.2)
ERYTHROCYTE [DISTWIDTH] IN BLOOD BY AUTOMATED COUNT: 13.6 % (ref 12.3–15.4)
EXPIRATION DATE: NORMAL
GLOBULIN UR ELPH-MCNC: 3.8 GM/DL
GLUCOSE SERPL-MCNC: 111 MG/DL (ref 65–99)
GLUCOSE UR STRIP-MCNC: NEGATIVE MG/DL
HCT VFR BLD AUTO: 44.8 % (ref 34–46.6)
HGB BLD-MCNC: 14.1 G/DL (ref 12–15.9)
HGB UR QL STRIP.AUTO: ABNORMAL
HOLD SPECIMEN: NORMAL
HOLD SPECIMEN: NORMAL
HYALINE CASTS UR QL AUTO: ABNORMAL /LPF
IMM GRANULOCYTES # BLD AUTO: 0.05 10*3/MM3 (ref 0–0.05)
IMM GRANULOCYTES NFR BLD AUTO: 0.4 % (ref 0–0.5)
INTERNAL NEGATIVE CONTROL: NORMAL
INTERNAL POSITIVE CONTROL: NORMAL
KETONES UR QL STRIP: ABNORMAL
LEUKOCYTE ESTERASE UR QL STRIP.AUTO: ABNORMAL
LIPASE SERPL-CCNC: 35 U/L (ref 13–60)
LYMPHOCYTES # BLD AUTO: 2.23 10*3/MM3 (ref 0.7–3.1)
LYMPHOCYTES NFR BLD AUTO: 18 % (ref 19.6–45.3)
Lab: NORMAL
MCH RBC QN AUTO: 27.5 PG (ref 26.6–33)
MCHC RBC AUTO-ENTMCNC: 31.5 G/DL (ref 31.5–35.7)
MCV RBC AUTO: 87.3 FL (ref 79–97)
MONOCYTES # BLD AUTO: 1.06 10*3/MM3 (ref 0.1–0.9)
MONOCYTES NFR BLD AUTO: 8.5 % (ref 5–12)
NEUTROPHILS NFR BLD AUTO: 72.3 % (ref 42.7–76)
NEUTROPHILS NFR BLD AUTO: 8.97 10*3/MM3 (ref 1.7–7)
NITRITE UR QL STRIP: NEGATIVE
NRBC BLD AUTO-RTO: 0 /100 WBC (ref 0–0.2)
PH UR STRIP.AUTO: 6.5 [PH] (ref 5–8)
PLATELET # BLD AUTO: 394 10*3/MM3 (ref 140–450)
PMV BLD AUTO: 10.5 FL (ref 6–12)
POTASSIUM SERPL-SCNC: 3.3 MMOL/L (ref 3.5–5.2)
PROT SERPL-MCNC: 8.1 G/DL (ref 6–8.5)
PROT UR QL STRIP: ABNORMAL
RBC # BLD AUTO: 5.13 10*6/MM3 (ref 3.77–5.28)
RBC # UR STRIP: ABNORMAL /HPF
REF LAB TEST METHOD: ABNORMAL
SODIUM SERPL-SCNC: 142 MMOL/L (ref 136–145)
SP GR UR STRIP: 1.01 (ref 1–1.03)
SQUAMOUS #/AREA URNS HPF: ABNORMAL /HPF
UROBILINOGEN UR QL STRIP: ABNORMAL
WBC # UR STRIP: ABNORMAL /HPF
WBC NRBC COR # BLD: 12.41 10*3/MM3 (ref 3.4–10.8)
WHOLE BLOOD HOLD COAG: NORMAL
WHOLE BLOOD HOLD SPECIMEN: NORMAL

## 2023-09-08 PROCEDURE — 85025 COMPLETE CBC W/AUTO DIFF WBC: CPT | Performed by: EMERGENCY MEDICINE

## 2023-09-08 PROCEDURE — G0378 HOSPITAL OBSERVATION PER HR: HCPCS

## 2023-09-08 PROCEDURE — 36415 COLL VENOUS BLD VENIPUNCTURE: CPT

## 2023-09-08 PROCEDURE — 81001 URINALYSIS AUTO W/SCOPE: CPT | Performed by: EMERGENCY MEDICINE

## 2023-09-08 PROCEDURE — 81025 URINE PREGNANCY TEST: CPT | Performed by: EMERGENCY MEDICINE

## 2023-09-08 PROCEDURE — 80053 COMPREHEN METABOLIC PANEL: CPT | Performed by: EMERGENCY MEDICINE

## 2023-09-08 PROCEDURE — 25010000002 PIPERACILLIN SOD-TAZOBACTAM PER 1 G: Performed by: PHYSICIAN ASSISTANT

## 2023-09-08 PROCEDURE — 76705 ECHO EXAM OF ABDOMEN: CPT

## 2023-09-08 PROCEDURE — 83690 ASSAY OF LIPASE: CPT | Performed by: EMERGENCY MEDICINE

## 2023-09-08 PROCEDURE — 87040 BLOOD CULTURE FOR BACTERIA: CPT | Performed by: PHYSICIAN ASSISTANT

## 2023-09-08 PROCEDURE — 96365 THER/PROPH/DIAG IV INF INIT: CPT

## 2023-09-08 PROCEDURE — 83605 ASSAY OF LACTIC ACID: CPT | Performed by: PHYSICIAN ASSISTANT

## 2023-09-08 PROCEDURE — 99284 EMERGENCY DEPT VISIT MOD MDM: CPT

## 2023-09-08 RX ORDER — KETOROLAC TROMETHAMINE 30 MG/ML
60 INJECTION, SOLUTION INTRAMUSCULAR; INTRAVENOUS ONCE
Status: DISCONTINUED | OUTPATIENT
Start: 2023-09-08 | End: 2023-09-08 | Stop reason: HOSPADM

## 2023-09-08 RX ORDER — ONDANSETRON 4 MG/1
4 TABLET, ORALLY DISINTEGRATING ORAL EVERY 8 HOURS PRN
COMMUNITY

## 2023-09-08 RX ORDER — KETOROLAC TROMETHAMINE 30 MG/ML
30 INJECTION, SOLUTION INTRAMUSCULAR; INTRAVENOUS ONCE
Status: DISCONTINUED | OUTPATIENT
Start: 2023-09-08 | End: 2023-09-08

## 2023-09-08 RX ORDER — BISACODYL 5 MG/1
5 TABLET, DELAYED RELEASE ORAL DAILY PRN
Status: DISCONTINUED | OUTPATIENT
Start: 2023-09-08 | End: 2023-09-10 | Stop reason: HOSPADM

## 2023-09-08 RX ORDER — ONDANSETRON 2 MG/ML
4 INJECTION INTRAMUSCULAR; INTRAVENOUS EVERY 6 HOURS PRN
Status: DISCONTINUED | OUTPATIENT
Start: 2023-09-08 | End: 2023-09-09 | Stop reason: SDUPTHER

## 2023-09-08 RX ORDER — ONDANSETRON 4 MG/1
4 TABLET, FILM COATED ORAL EVERY 6 HOURS PRN
Status: DISCONTINUED | OUTPATIENT
Start: 2023-09-08 | End: 2023-09-09 | Stop reason: SDUPTHER

## 2023-09-08 RX ORDER — SODIUM CHLORIDE 0.9 % (FLUSH) 0.9 %
10 SYRINGE (ML) INJECTION AS NEEDED
Status: DISCONTINUED | OUTPATIENT
Start: 2023-09-08 | End: 2023-09-10 | Stop reason: HOSPADM

## 2023-09-08 RX ORDER — AMOXICILLIN 250 MG
2 CAPSULE ORAL 2 TIMES DAILY
Status: DISCONTINUED | OUTPATIENT
Start: 2023-09-09 | End: 2023-09-10 | Stop reason: HOSPADM

## 2023-09-08 RX ORDER — NALOXONE HCL 0.4 MG/ML
0.4 VIAL (ML) INJECTION
Status: DISCONTINUED | OUTPATIENT
Start: 2023-09-08 | End: 2023-09-09 | Stop reason: SDUPTHER

## 2023-09-08 RX ORDER — HYDROCODONE BITARTRATE AND ACETAMINOPHEN 5; 325 MG/1; MG/1
1 TABLET ORAL EVERY 6 HOURS PRN
COMMUNITY
End: 2023-09-10 | Stop reason: HOSPADM

## 2023-09-08 RX ORDER — SODIUM CHLORIDE 9 MG/ML
100 INJECTION, SOLUTION INTRAVENOUS CONTINUOUS
Status: ACTIVE | OUTPATIENT
Start: 2023-09-09 | End: 2023-09-09

## 2023-09-08 RX ORDER — SODIUM CHLORIDE 9 MG/ML
10 INJECTION INTRAVENOUS AS NEEDED
Status: DISCONTINUED | OUTPATIENT
Start: 2023-09-08 | End: 2023-09-10 | Stop reason: HOSPADM

## 2023-09-08 RX ORDER — POLYETHYLENE GLYCOL 3350 17 G/17G
17 POWDER, FOR SOLUTION ORAL DAILY PRN
Status: DISCONTINUED | OUTPATIENT
Start: 2023-09-08 | End: 2023-09-10 | Stop reason: HOSPADM

## 2023-09-08 RX ORDER — BISACODYL 10 MG
10 SUPPOSITORY, RECTAL RECTAL DAILY PRN
Status: DISCONTINUED | OUTPATIENT
Start: 2023-09-08 | End: 2023-09-10 | Stop reason: HOSPADM

## 2023-09-08 RX ORDER — NICOTINE 21 MG/24HR
1 PATCH, TRANSDERMAL 24 HOURS TRANSDERMAL
Status: DISCONTINUED | OUTPATIENT
Start: 2023-09-09 | End: 2023-09-10 | Stop reason: HOSPADM

## 2023-09-08 RX ORDER — MORPHINE SULFATE 2 MG/ML
1 INJECTION, SOLUTION INTRAMUSCULAR; INTRAVENOUS
Status: DISCONTINUED | OUTPATIENT
Start: 2023-09-08 | End: 2023-09-09 | Stop reason: SDUPTHER

## 2023-09-08 RX ORDER — NITROGLYCERIN 0.4 MG/1
0.4 TABLET SUBLINGUAL
Status: DISCONTINUED | OUTPATIENT
Start: 2023-09-08 | End: 2023-09-10 | Stop reason: HOSPADM

## 2023-09-08 RX ADMIN — PIPERACILLIN SODIUM AND TAZOBACTAM SODIUM 3.38 G: 3; .375 INJECTION, SOLUTION INTRAVENOUS at 23:19

## 2023-09-08 RX ADMIN — SODIUM CHLORIDE 1000 ML: 9 INJECTION, SOLUTION INTRAVENOUS at 23:20

## 2023-09-08 RX ADMIN — KETOROLAC TROMETHAMINE 30 MG: 30 INJECTION, SOLUTION INTRAMUSCULAR; INTRAVENOUS at 18:23

## 2023-09-08 NOTE — Clinical Note
Level of Care: Telemetry [5]   Diagnosis: Acute cholecystitis [575.0.ICD-9-CM]   Admitting Physician: UZMA ANN [[id number]]   Attending Physician: UZMA ANN [[id number]]   Bed Request Comments: tele

## 2023-09-08 NOTE — PROGRESS NOTES
Subjective     Chief Complaint:    Chief Complaint   Patient presents with    Back Pain     Pt sts been to ED last Tuesday,  Pain running down left leg into foot    Abdominal Pain     Pt sts pain since Sun, Was set up with Gen Surg but she is in a lot of pain.       History of Present Illness:  Here for ED follow up. Presents to clinic today with epigastric and RUQ pain, describes as sharp, aching. No radiation of pain. Been having sweats. No fever or chills. Acute on chronic nausea. Typically feels better after vomiting. Pain became worse and constant on Sunday, went to ED 9/5 ( [place]). Received Zofran, IV pain medications, and abd CT and was discharged home. N/V and pain persisted and she returned to ED on 9/6 ([place]), and was treated with morphine and zofran. Rates pain 8/10 today. Vomited 1X today. Abd tenderness and guarding-patient laying in fetal postion. Has not eaten much since Tuesday. Is able to to drink some water. Soaking in bath tub seems to improve pain some. Received [place] in the ED but has failed oral meds. Has an outpatient appt with Dr. Katina Asif (general surgery) 9/13.     [facility]  ABDOMEN/PELVIS:  Liver, gallbladder and bile ducts:  Unenhanced liver is grossly unremarkable without suspicious focal  abnormality. Distended gallbladder. There are few gallstones filling the  entire gallbladder lumen. Hyperdense material within the gallbladder  lumen. Mild diffuse gallbladder wall thickening. No surrounding  inflammation. No definite biliary ductal dilatation.      ROS:   Gen- + fevers subjective, chills  CV- No chest pain, palpitations  Resp- No cough, dyspnea  Neuro-No dizziness, headaches      I have reviewed and/or updated the patient's past medical, surgical, family, social history and problem list as appropriate.     Medications:  No current outpatient medications on file.    Current Facility-Administered Medications:     ketorolac (TORADOL)  "injection 30 mg, 30 mg, Intramuscular, Once, Mariella Jimenez APRN    Allergies:  No Known Allergies    Objective     Vital Signs:   Vitals:    09/08/23 1706   BP: 130/100   Pulse: (!) 132   SpO2: 97%   Weight: 115 kg (253 lb 12.8 oz)   Height: 175.3 cm (69\")     Body mass index is 37.48 kg/m².    Physical Exam:    Physical Exam  Constitutional:       Appearance: Normal appearance. She is ill-appearing.   HENT:      Head: Normocephalic and atraumatic.   Cardiovascular:      Rate and Rhythm: Regular rhythm. Tachycardia present.      Pulses: Normal pulses.      Heart sounds: Normal heart sounds.   Pulmonary:      Effort: Pulmonary effort is normal.      Breath sounds: Normal breath sounds.   Abdominal:      General: Bowel sounds are normal.      Palpations: Abdomen is soft.      Tenderness: There is abdominal tenderness (epigastric and RUQ to light palpation). There is guarding.   Skin:     General: Skin is warm and dry.      Capillary Refill: Capillary refill takes less than 2 seconds.   Neurological:      General: No focal deficit present.      Mental Status: She is alert and oriented to person, place, and time.   Psychiatric:         Mood and Affect: Mood normal.         Behavior: Behavior normal.       Assessment / Plan     Assessment/Plan:   Problem List Items Addressed This Visit    None  Visit Diagnoses       RUQ pain    -  Primary    Relevant Medications    ketorolac (TORADOL) injection 30 mg    Calculus of gallbladder with acute on chronic cholecystitis without obstruction        Relevant Medications    ketorolac (TORADOL) injection 30 mg    Abnormal CT of the abdomen        Relevant Medications    ketorolac (TORADOL) injection 30 mg          -- 30 mg IM Toradol for pain in office  -- given she has not had an US (which is more specific for acute cholecystitis), her abnormal CT findings and failure of outpatient treatment/management, I have advised patient to go to the [facility] ED for urgent eval and pain " control  -- discussed these findings with an  ED provider Moo     Discussed plan of care in detail with pt today; pt verb understanding and agrees.    Follow up:  As needed    I, Mariella HAMMONDS, personally performed the services described in this documentation, as scribed by CASSIUS Luz student in my presence, and is both accurate and complete    Electronically signed by CASSIUS Salgado   09/08/2023 17:21 EDT      Please note that portions of this note were completed with a voice recognition program.

## 2023-09-09 ENCOUNTER — ANESTHESIA EVENT (OUTPATIENT)
Dept: PERIOP | Facility: HOSPITAL | Age: 33
End: 2023-09-09
Payer: COMMERCIAL

## 2023-09-09 ENCOUNTER — ANESTHESIA (OUTPATIENT)
Dept: PERIOP | Facility: HOSPITAL | Age: 33
End: 2023-09-09
Payer: COMMERCIAL

## 2023-09-09 LAB
ABO GROUP BLD: NORMAL
ALBUMIN SERPL-MCNC: 3.6 G/DL (ref 3.5–5.2)
ALBUMIN/GLOB SERPL: 1.1 G/DL
ALP SERPL-CCNC: 90 U/L (ref 39–117)
ALT SERPL W P-5'-P-CCNC: 39 U/L (ref 1–33)
ANION GAP SERPL CALCULATED.3IONS-SCNC: 12 MMOL/L (ref 5–15)
AST SERPL-CCNC: 14 U/L (ref 1–32)
BASOPHILS # BLD AUTO: 0.03 10*3/MM3 (ref 0–0.2)
BASOPHILS NFR BLD AUTO: 0.3 % (ref 0–1.5)
BILIRUB SERPL-MCNC: 0.5 MG/DL (ref 0–1.2)
BLD GP AB SCN SERPL QL: NEGATIVE
BUN SERPL-MCNC: 10 MG/DL (ref 6–20)
BUN/CREAT SERPL: 19.6 (ref 7–25)
CALCIUM SPEC-SCNC: 8.7 MG/DL (ref 8.6–10.5)
CHLORIDE SERPL-SCNC: 103 MMOL/L (ref 98–107)
CO2 SERPL-SCNC: 25 MMOL/L (ref 22–29)
CREAT SERPL-MCNC: 0.51 MG/DL (ref 0.57–1)
DEPRECATED RDW RBC AUTO: 43 FL (ref 37–54)
EGFRCR SERPLBLD CKD-EPI 2021: 129 ML/MIN/1.73
EOSINOPHIL # BLD AUTO: 0.08 10*3/MM3 (ref 0–0.4)
EOSINOPHIL NFR BLD AUTO: 0.9 % (ref 0.3–6.2)
ERYTHROCYTE [DISTWIDTH] IN BLOOD BY AUTOMATED COUNT: 13.5 % (ref 12.3–15.4)
GLOBULIN UR ELPH-MCNC: 3.2 GM/DL
GLUCOSE SERPL-MCNC: 101 MG/DL (ref 65–99)
HCT VFR BLD AUTO: 39.3 % (ref 34–46.6)
HGB BLD-MCNC: 12.4 G/DL (ref 12–15.9)
HOLD SPECIMEN: NORMAL
IMM GRANULOCYTES # BLD AUTO: 0.03 10*3/MM3 (ref 0–0.05)
IMM GRANULOCYTES NFR BLD AUTO: 0.3 % (ref 0–0.5)
LYMPHOCYTES # BLD AUTO: 1.98 10*3/MM3 (ref 0.7–3.1)
LYMPHOCYTES NFR BLD AUTO: 21.3 % (ref 19.6–45.3)
MAGNESIUM SERPL-MCNC: 2.1 MG/DL (ref 1.6–2.6)
MCH RBC QN AUTO: 27.4 PG (ref 26.6–33)
MCHC RBC AUTO-ENTMCNC: 31.6 G/DL (ref 31.5–35.7)
MCV RBC AUTO: 86.9 FL (ref 79–97)
MONOCYTES # BLD AUTO: 0.92 10*3/MM3 (ref 0.1–0.9)
MONOCYTES NFR BLD AUTO: 9.9 % (ref 5–12)
NEUTROPHILS NFR BLD AUTO: 6.25 10*3/MM3 (ref 1.7–7)
NEUTROPHILS NFR BLD AUTO: 67.3 % (ref 42.7–76)
NRBC BLD AUTO-RTO: 0 /100 WBC (ref 0–0.2)
PLATELET # BLD AUTO: 321 10*3/MM3 (ref 140–450)
PMV BLD AUTO: 10.7 FL (ref 6–12)
POTASSIUM SERPL-SCNC: 3.3 MMOL/L (ref 3.5–5.2)
POTASSIUM SERPL-SCNC: 3.5 MMOL/L (ref 3.5–5.2)
PROT SERPL-MCNC: 6.8 G/DL (ref 6–8.5)
RBC # BLD AUTO: 4.52 10*6/MM3 (ref 3.77–5.28)
RH BLD: POSITIVE
SODIUM SERPL-SCNC: 140 MMOL/L (ref 136–145)
T&S EXPIRATION DATE: NORMAL
WBC NRBC COR # BLD: 9.29 10*3/MM3 (ref 3.4–10.8)

## 2023-09-09 PROCEDURE — 25010000002 DEXAMETHASONE PER 1 MG: Performed by: NURSE ANESTHETIST, CERTIFIED REGISTERED

## 2023-09-09 PROCEDURE — 25010000002 HYDROMORPHONE 1 MG/ML SOLUTION: Performed by: NURSE PRACTITIONER

## 2023-09-09 PROCEDURE — 25010000002 ONDANSETRON PER 1 MG: Performed by: NURSE ANESTHETIST, CERTIFIED REGISTERED

## 2023-09-09 PROCEDURE — 25010000002 PIPERACILLIN SOD-TAZOBACTAM PER 1 G: Performed by: NURSE PRACTITIONER

## 2023-09-09 PROCEDURE — 25010000002 MORPHINE PER 10 MG: Performed by: NURSE PRACTITIONER

## 2023-09-09 PROCEDURE — 96367 TX/PROPH/DG ADDL SEQ IV INF: CPT

## 2023-09-09 PROCEDURE — 86901 BLOOD TYPING SEROLOGIC RH(D): CPT | Performed by: NURSE PRACTITIONER

## 2023-09-09 PROCEDURE — 88304 TISSUE EXAM BY PATHOLOGIST: CPT | Performed by: SURGERY

## 2023-09-09 PROCEDURE — 85025 COMPLETE CBC W/AUTO DIFF WBC: CPT | Performed by: NURSE PRACTITIONER

## 2023-09-09 PROCEDURE — 0 POTASSIUM CHLORIDE 10 MEQ/100ML SOLUTION: Performed by: INTERNAL MEDICINE

## 2023-09-09 PROCEDURE — G0378 HOSPITAL OBSERVATION PER HR: HCPCS

## 2023-09-09 PROCEDURE — 96375 TX/PRO/DX INJ NEW DRUG ADDON: CPT

## 2023-09-09 PROCEDURE — 96366 THER/PROPH/DIAG IV INF ADDON: CPT

## 2023-09-09 PROCEDURE — 25010000002 HYDROMORPHONE 1 MG/ML SOLUTION

## 2023-09-09 PROCEDURE — 96376 TX/PRO/DX INJ SAME DRUG ADON: CPT

## 2023-09-09 PROCEDURE — 86900 BLOOD TYPING SEROLOGIC ABO: CPT | Performed by: NURSE PRACTITIONER

## 2023-09-09 PROCEDURE — 25010000002 DROPERIDOL PER 5 MG

## 2023-09-09 PROCEDURE — 25010000002 SUGAMMADEX 200 MG/2ML SOLUTION: Performed by: NURSE ANESTHETIST, CERTIFIED REGISTERED

## 2023-09-09 PROCEDURE — 25010000002 PROPOFOL 10 MG/ML EMULSION: Performed by: NURSE ANESTHETIST, CERTIFIED REGISTERED

## 2023-09-09 PROCEDURE — 80053 COMPREHEN METABOLIC PANEL: CPT | Performed by: NURSE PRACTITIONER

## 2023-09-09 PROCEDURE — 25010000002 HYDROMORPHONE PER 4 MG: Performed by: NURSE ANESTHETIST, CERTIFIED REGISTERED

## 2023-09-09 PROCEDURE — 84132 ASSAY OF SERUM POTASSIUM: CPT | Performed by: HOSPITALIST

## 2023-09-09 PROCEDURE — 86850 RBC ANTIBODY SCREEN: CPT | Performed by: NURSE PRACTITIONER

## 2023-09-09 PROCEDURE — 25010000002 FENTANYL CITRATE (PF) 50 MCG/ML SOLUTION

## 2023-09-09 PROCEDURE — 25010000002 PIPERACILLIN SOD-TAZOBACTAM PER 1 G: Performed by: SURGERY

## 2023-09-09 PROCEDURE — 25010000002 FENTANYL CITRATE (PF) 100 MCG/2ML SOLUTION: Performed by: NURSE ANESTHETIST, CERTIFIED REGISTERED

## 2023-09-09 PROCEDURE — 84132 ASSAY OF SERUM POTASSIUM: CPT | Performed by: SURGERY

## 2023-09-09 PROCEDURE — 83735 ASSAY OF MAGNESIUM: CPT | Performed by: NURSE PRACTITIONER

## 2023-09-09 PROCEDURE — 25010000002 HYDROMORPHONE PER 4 MG: Performed by: SURGERY

## 2023-09-09 DEVICE — LIGAMAX 5 MM ENDOSCOPIC MULTIPLE CLIP APPLIER
Type: IMPLANTABLE DEVICE | Site: BILE DUCT | Status: FUNCTIONAL
Brand: LIGAMAX

## 2023-09-09 RX ORDER — FENTANYL CITRATE 50 UG/ML
INJECTION, SOLUTION INTRAMUSCULAR; INTRAVENOUS
Status: COMPLETED
Start: 2023-09-09 | End: 2023-09-09

## 2023-09-09 RX ORDER — OXYCODONE HYDROCHLORIDE AND ACETAMINOPHEN 5; 325 MG/1; MG/1
2 TABLET ORAL EVERY 4 HOURS PRN
Status: DISCONTINUED | OUTPATIENT
Start: 2023-09-09 | End: 2023-09-10 | Stop reason: HOSPADM

## 2023-09-09 RX ORDER — DEXAMETHASONE SODIUM PHOSPHATE 4 MG/ML
INJECTION, SOLUTION INTRA-ARTICULAR; INTRALESIONAL; INTRAMUSCULAR; INTRAVENOUS; SOFT TISSUE AS NEEDED
Status: DISCONTINUED | OUTPATIENT
Start: 2023-09-09 | End: 2023-09-09 | Stop reason: SURG

## 2023-09-09 RX ORDER — PHENYLEPHRINE HCL IN 0.9% NACL 1 MG/10 ML
SYRINGE (ML) INTRAVENOUS AS NEEDED
Status: DISCONTINUED | OUTPATIENT
Start: 2023-09-09 | End: 2023-09-09 | Stop reason: SURG

## 2023-09-09 RX ORDER — MORPHINE SULFATE 2 MG/ML
2 INJECTION, SOLUTION INTRAMUSCULAR; INTRAVENOUS
Status: DISCONTINUED | OUTPATIENT
Start: 2023-09-09 | End: 2023-09-09

## 2023-09-09 RX ORDER — MAGNESIUM HYDROXIDE 1200 MG/15ML
LIQUID ORAL AS NEEDED
Status: DISCONTINUED | OUTPATIENT
Start: 2023-09-09 | End: 2023-09-09 | Stop reason: HOSPADM

## 2023-09-09 RX ORDER — HYDROMORPHONE HYDROCHLORIDE 2 MG/ML
INJECTION, SOLUTION INTRAMUSCULAR; INTRAVENOUS; SUBCUTANEOUS AS NEEDED
Status: DISCONTINUED | OUTPATIENT
Start: 2023-09-09 | End: 2023-09-09 | Stop reason: SURG

## 2023-09-09 RX ORDER — FENTANYL CITRATE 50 UG/ML
50 INJECTION, SOLUTION INTRAMUSCULAR; INTRAVENOUS
Status: DISCONTINUED | OUTPATIENT
Start: 2023-09-09 | End: 2023-09-09 | Stop reason: HOSPADM

## 2023-09-09 RX ORDER — ONDANSETRON 2 MG/ML
INJECTION INTRAMUSCULAR; INTRAVENOUS AS NEEDED
Status: DISCONTINUED | OUTPATIENT
Start: 2023-09-09 | End: 2023-09-09 | Stop reason: SURG

## 2023-09-09 RX ORDER — EPHEDRINE SULFATE 50 MG/ML
INJECTION INTRAVENOUS AS NEEDED
Status: DISCONTINUED | OUTPATIENT
Start: 2023-09-09 | End: 2023-09-09 | Stop reason: SURG

## 2023-09-09 RX ORDER — FENTANYL CITRATE 50 UG/ML
INJECTION, SOLUTION INTRAMUSCULAR; INTRAVENOUS AS NEEDED
Status: DISCONTINUED | OUTPATIENT
Start: 2023-09-09 | End: 2023-09-09 | Stop reason: SURG

## 2023-09-09 RX ORDER — LIDOCAINE HYDROCHLORIDE 10 MG/ML
INJECTION, SOLUTION EPIDURAL; INFILTRATION; INTRACAUDAL; PERINEURAL AS NEEDED
Status: DISCONTINUED | OUTPATIENT
Start: 2023-09-09 | End: 2023-09-09 | Stop reason: SURG

## 2023-09-09 RX ORDER — NALOXONE HCL 0.4 MG/ML
0.4 VIAL (ML) INJECTION
Status: DISCONTINUED | OUTPATIENT
Start: 2023-09-09 | End: 2023-09-09

## 2023-09-09 RX ORDER — LIDOCAINE HYDROCHLORIDE 10 MG/ML
0.5 INJECTION, SOLUTION EPIDURAL; INFILTRATION; INTRACAUDAL; PERINEURAL ONCE AS NEEDED
Status: DISCONTINUED | OUTPATIENT
Start: 2023-09-09 | End: 2023-09-09 | Stop reason: HOSPADM

## 2023-09-09 RX ORDER — FENTANYL CITRATE 50 UG/ML
50 INJECTION, SOLUTION INTRAMUSCULAR; INTRAVENOUS ONCE
Status: COMPLETED | OUTPATIENT
Start: 2023-09-09 | End: 2023-09-09

## 2023-09-09 RX ORDER — NALOXONE HCL 0.4 MG/ML
0.1 VIAL (ML) INJECTION
Status: DISCONTINUED | OUTPATIENT
Start: 2023-09-09 | End: 2023-09-10 | Stop reason: HOSPADM

## 2023-09-09 RX ORDER — HYDROMORPHONE HYDROCHLORIDE 1 MG/ML
0.5 INJECTION, SOLUTION INTRAMUSCULAR; INTRAVENOUS; SUBCUTANEOUS
Status: DISCONTINUED | OUTPATIENT
Start: 2023-09-09 | End: 2023-09-10 | Stop reason: HOSPADM

## 2023-09-09 RX ORDER — SODIUM CHLORIDE 0.9 % (FLUSH) 0.9 %
10 SYRINGE (ML) INJECTION AS NEEDED
Status: CANCELLED | OUTPATIENT
Start: 2023-09-09

## 2023-09-09 RX ORDER — SODIUM CHLORIDE 9 MG/ML
INJECTION, SOLUTION INTRAVENOUS AS NEEDED
Status: DISCONTINUED | OUTPATIENT
Start: 2023-09-09 | End: 2023-09-09 | Stop reason: HOSPADM

## 2023-09-09 RX ORDER — HEPARIN SODIUM 5000 [USP'U]/ML
5000 INJECTION, SOLUTION INTRAVENOUS; SUBCUTANEOUS EVERY 8 HOURS SCHEDULED
Status: DISCONTINUED | OUTPATIENT
Start: 2023-09-10 | End: 2023-09-10 | Stop reason: HOSPADM

## 2023-09-09 RX ORDER — SODIUM CHLORIDE, SODIUM LACTATE, POTASSIUM CHLORIDE, CALCIUM CHLORIDE 600; 310; 30; 20 MG/100ML; MG/100ML; MG/100ML; MG/100ML
9 INJECTION, SOLUTION INTRAVENOUS CONTINUOUS
Status: DISCONTINUED | OUTPATIENT
Start: 2023-09-09 | End: 2023-09-09 | Stop reason: SDUPTHER

## 2023-09-09 RX ORDER — ENALAPRILAT 1.25 MG/ML
1.25 INJECTION INTRAVENOUS ONCE
Status: COMPLETED | OUTPATIENT
Start: 2023-09-09 | End: 2023-09-09

## 2023-09-09 RX ORDER — ROCURONIUM BROMIDE 10 MG/ML
INJECTION, SOLUTION INTRAVENOUS AS NEEDED
Status: DISCONTINUED | OUTPATIENT
Start: 2023-09-09 | End: 2023-09-09 | Stop reason: SURG

## 2023-09-09 RX ORDER — FAMOTIDINE 10 MG/ML
INJECTION, SOLUTION INTRAVENOUS
Status: COMPLETED
Start: 2023-09-09 | End: 2023-09-09

## 2023-09-09 RX ORDER — POTASSIUM CHLORIDE 20 MEQ/1
40 TABLET, EXTENDED RELEASE ORAL EVERY 4 HOURS
Status: COMPLETED | OUTPATIENT
Start: 2023-09-09 | End: 2023-09-09

## 2023-09-09 RX ORDER — DOCUSATE SODIUM 100 MG/1
100 CAPSULE, LIQUID FILLED ORAL 2 TIMES DAILY PRN
Status: DISCONTINUED | OUTPATIENT
Start: 2023-09-09 | End: 2023-09-10 | Stop reason: HOSPADM

## 2023-09-09 RX ORDER — SODIUM CHLORIDE 9 MG/ML
40 INJECTION, SOLUTION INTRAVENOUS AS NEEDED
Status: CANCELLED | OUTPATIENT
Start: 2023-09-09

## 2023-09-09 RX ORDER — BUPIVACAINE HYDROCHLORIDE AND EPINEPHRINE 2.5; 5 MG/ML; UG/ML
INJECTION, SOLUTION EPIDURAL; INFILTRATION; INTRACAUDAL; PERINEURAL AS NEEDED
Status: DISCONTINUED | OUTPATIENT
Start: 2023-09-09 | End: 2023-09-09 | Stop reason: HOSPADM

## 2023-09-09 RX ORDER — HYDROMORPHONE HYDROCHLORIDE 1 MG/ML
0.5 INJECTION, SOLUTION INTRAMUSCULAR; INTRAVENOUS; SUBCUTANEOUS
Status: DISCONTINUED | OUTPATIENT
Start: 2023-09-09 | End: 2023-09-09 | Stop reason: HOSPADM

## 2023-09-09 RX ORDER — SODIUM CHLORIDE 0.9 % (FLUSH) 0.9 %
10 SYRINGE (ML) INJECTION EVERY 12 HOURS SCHEDULED
Status: CANCELLED | OUTPATIENT
Start: 2023-09-09

## 2023-09-09 RX ORDER — FAMOTIDINE 10 MG/ML
20 INJECTION, SOLUTION INTRAVENOUS EVERY 12 HOURS SCHEDULED
Status: DISCONTINUED | OUTPATIENT
Start: 2023-09-09 | End: 2023-09-09 | Stop reason: HOSPADM

## 2023-09-09 RX ORDER — SODIUM CHLORIDE, SODIUM LACTATE, POTASSIUM CHLORIDE, CALCIUM CHLORIDE 600; 310; 30; 20 MG/100ML; MG/100ML; MG/100ML; MG/100ML
9 INJECTION, SOLUTION INTRAVENOUS CONTINUOUS
Status: DISCONTINUED | OUTPATIENT
Start: 2023-09-09 | End: 2023-09-10 | Stop reason: HOSPADM

## 2023-09-09 RX ORDER — POTASSIUM CHLORIDE 7.45 MG/ML
10 INJECTION INTRAVENOUS
Status: COMPLETED | OUTPATIENT
Start: 2023-09-09 | End: 2023-09-09

## 2023-09-09 RX ORDER — ONDANSETRON 4 MG/1
4 TABLET, FILM COATED ORAL EVERY 6 HOURS PRN
Status: DISCONTINUED | OUTPATIENT
Start: 2023-09-09 | End: 2023-09-10 | Stop reason: HOSPADM

## 2023-09-09 RX ORDER — ONDANSETRON 2 MG/ML
4 INJECTION INTRAMUSCULAR; INTRAVENOUS EVERY 6 HOURS PRN
Status: DISCONTINUED | OUTPATIENT
Start: 2023-09-09 | End: 2023-09-10 | Stop reason: HOSPADM

## 2023-09-09 RX ORDER — SODIUM CHLORIDE, SODIUM LACTATE, POTASSIUM CHLORIDE, CALCIUM CHLORIDE 600; 310; 30; 20 MG/100ML; MG/100ML; MG/100ML; MG/100ML
125 INJECTION, SOLUTION INTRAVENOUS CONTINUOUS
Status: DISCONTINUED | OUTPATIENT
Start: 2023-09-09 | End: 2023-09-10 | Stop reason: HOSPADM

## 2023-09-09 RX ORDER — DROPERIDOL 2.5 MG/ML
INJECTION, SOLUTION INTRAMUSCULAR; INTRAVENOUS
Status: COMPLETED
Start: 2023-09-09 | End: 2023-09-09

## 2023-09-09 RX ORDER — PROPOFOL 10 MG/ML
VIAL (ML) INTRAVENOUS AS NEEDED
Status: DISCONTINUED | OUTPATIENT
Start: 2023-09-09 | End: 2023-09-09 | Stop reason: SURG

## 2023-09-09 RX ORDER — MIDAZOLAM HYDROCHLORIDE 1 MG/ML
1 INJECTION INTRAMUSCULAR; INTRAVENOUS
Status: DISCONTINUED | OUTPATIENT
Start: 2023-09-09 | End: 2023-09-09 | Stop reason: HOSPADM

## 2023-09-09 RX ADMIN — FENTANYL CITRATE 50 MCG: 50 INJECTION, SOLUTION INTRAMUSCULAR; INTRAVENOUS at 09:20

## 2023-09-09 RX ADMIN — SODIUM CHLORIDE, POTASSIUM CHLORIDE, SODIUM LACTATE AND CALCIUM CHLORIDE 125 ML/HR: 600; 310; 30; 20 INJECTION, SOLUTION INTRAVENOUS at 12:47

## 2023-09-09 RX ADMIN — HYDROMORPHONE HYDROCHLORIDE 0.5 MG: 1 INJECTION, SOLUTION INTRAMUSCULAR; INTRAVENOUS; SUBCUTANEOUS at 14:00

## 2023-09-09 RX ADMIN — POTASSIUM CHLORIDE 10 MEQ: 7.46 INJECTION, SOLUTION INTRAVENOUS at 03:52

## 2023-09-09 RX ADMIN — MORPHINE SULFATE 1 MG: 2 INJECTION, SOLUTION INTRAMUSCULAR; INTRAVENOUS at 07:42

## 2023-09-09 RX ADMIN — Medication 100 MCG: at 10:03

## 2023-09-09 RX ADMIN — HYDROMORPHONE HYDROCHLORIDE 0.5 MG: 1 INJECTION, SOLUTION INTRAMUSCULAR; INTRAVENOUS; SUBCUTANEOUS at 20:51

## 2023-09-09 RX ADMIN — HYDROMORPHONE HYDROCHLORIDE 0.5 MG: 1 INJECTION, SOLUTION INTRAMUSCULAR; INTRAVENOUS; SUBCUTANEOUS at 11:31

## 2023-09-09 RX ADMIN — ENALAPRILAT 1.25 MG: 2.5 INJECTION INTRAVENOUS at 12:46

## 2023-09-09 RX ADMIN — FAMOTIDINE 20 MG: 10 INJECTION INTRAVENOUS at 09:21

## 2023-09-09 RX ADMIN — HYDROMORPHONE HYDROCHLORIDE 0.5 MG: 2 INJECTION, SOLUTION INTRAMUSCULAR; INTRAVENOUS; SUBCUTANEOUS at 10:00

## 2023-09-09 RX ADMIN — POTASSIUM CHLORIDE 10 MEQ: 7.46 INJECTION, SOLUTION INTRAVENOUS at 01:15

## 2023-09-09 RX ADMIN — POTASSIUM CHLORIDE 10 MEQ: 7.46 INJECTION, SOLUTION INTRAVENOUS at 05:00

## 2023-09-09 RX ADMIN — FENTANYL CITRATE 50 MCG: 50 INJECTION, SOLUTION INTRAMUSCULAR; INTRAVENOUS at 11:48

## 2023-09-09 RX ADMIN — HYDROMORPHONE HYDROCHLORIDE 0.5 MG: 1 INJECTION, SOLUTION INTRAMUSCULAR; INTRAVENOUS; SUBCUTANEOUS at 17:57

## 2023-09-09 RX ADMIN — MORPHINE SULFATE 1 MG: 2 INJECTION, SOLUTION INTRAMUSCULAR; INTRAVENOUS at 03:51

## 2023-09-09 RX ADMIN — POTASSIUM CHLORIDE 40 MEQ: 1500 TABLET, EXTENDED RELEASE ORAL at 17:53

## 2023-09-09 RX ADMIN — PIPERACILLIN SODIUM AND TAZOBACTAM SODIUM 3.38 G: 3; .375 INJECTION, SOLUTION INTRAVENOUS at 06:01

## 2023-09-09 RX ADMIN — SENNOSIDES AND DOCUSATE SODIUM 2 TABLET: 50; 8.6 TABLET ORAL at 20:51

## 2023-09-09 RX ADMIN — ROCURONIUM BROMIDE 30 MG: 10 SOLUTION INTRAVENOUS at 09:46

## 2023-09-09 RX ADMIN — POTASSIUM CHLORIDE 10 MEQ: 7.46 INJECTION, SOLUTION INTRAVENOUS at 02:28

## 2023-09-09 RX ADMIN — FAMOTIDINE 20 MG: 10 INJECTION, SOLUTION INTRAVENOUS at 09:21

## 2023-09-09 RX ADMIN — PIPERACILLIN SODIUM AND TAZOBACTAM SODIUM 3.38 G: 3; .375 INJECTION, SOLUTION INTRAVENOUS at 22:15

## 2023-09-09 RX ADMIN — ONDANSETRON 4 MG: 2 INJECTION INTRAMUSCULAR; INTRAVENOUS at 10:58

## 2023-09-09 RX ADMIN — SODIUM CHLORIDE, POTASSIUM CHLORIDE, SODIUM LACTATE AND CALCIUM CHLORIDE 125 ML/HR: 600; 310; 30; 20 INJECTION, SOLUTION INTRAVENOUS at 20:57

## 2023-09-09 RX ADMIN — SODIUM CHLORIDE 100 ML/HR: 9 INJECTION, SOLUTION INTRAVENOUS at 01:14

## 2023-09-09 RX ADMIN — PIPERACILLIN SODIUM AND TAZOBACTAM SODIUM 3.38 G: 3; .375 INJECTION, SOLUTION INTRAVENOUS at 12:47

## 2023-09-09 RX ADMIN — HYDROMORPHONE HYDROCHLORIDE 1 MG: 1 INJECTION, SOLUTION INTRAMUSCULAR; INTRAVENOUS; SUBCUTANEOUS at 21:56

## 2023-09-09 RX ADMIN — NICOTINE 1 PATCH: 14 PATCH, EXTENDED RELEASE TRANSDERMAL at 07:44

## 2023-09-09 RX ADMIN — LIDOCAINE HYDROCHLORIDE 50 MG: 10 INJECTION, SOLUTION EPIDURAL; INFILTRATION; INTRACAUDAL; PERINEURAL at 09:46

## 2023-09-09 RX ADMIN — HYDROMORPHONE HYDROCHLORIDE 0.5 MG: 1 INJECTION, SOLUTION INTRAMUSCULAR; INTRAVENOUS; SUBCUTANEOUS at 15:57

## 2023-09-09 RX ADMIN — DROPERIDOL: 2.5 INJECTION, SOLUTION INTRAMUSCULAR; INTRAVENOUS at 15:12

## 2023-09-09 RX ADMIN — POTASSIUM CHLORIDE 40 MEQ: 1500 TABLET, EXTENDED RELEASE ORAL at 15:54

## 2023-09-09 RX ADMIN — FENTANYL CITRATE 100 MCG: 50 INJECTION, SOLUTION INTRAMUSCULAR; INTRAVENOUS at 09:46

## 2023-09-09 RX ADMIN — EPHEDRINE SULFATE 5 MG: 50 INJECTION INTRAVENOUS at 10:03

## 2023-09-09 RX ADMIN — DEXAMETHASONE SODIUM PHOSPHATE 4 MG: 4 INJECTION, SOLUTION INTRAMUSCULAR; INTRAVENOUS at 09:46

## 2023-09-09 RX ADMIN — HYDROMORPHONE HYDROCHLORIDE 0.5 MG: 2 INJECTION, SOLUTION INTRAMUSCULAR; INTRAVENOUS; SUBCUTANEOUS at 10:58

## 2023-09-09 RX ADMIN — PROPOFOL 200 MG: 10 INJECTION, EMULSION INTRAVENOUS at 09:46

## 2023-09-09 RX ADMIN — SUGAMMADEX 200 MG: 100 INJECTION, SOLUTION INTRAVENOUS at 10:58

## 2023-09-09 RX ADMIN — SODIUM CHLORIDE, POTASSIUM CHLORIDE, SODIUM LACTATE AND CALCIUM CHLORIDE: 600; 310; 30; 20 INJECTION, SOLUTION INTRAVENOUS at 09:42

## 2023-09-09 NOTE — ED PROVIDER NOTES
EMERGENCY DEPARTMENT ENCOUNTER    Pt Name: Comfort Main  MRN: [id number]  Pt :   1990  Room Number:  YULI/YULI  Date of encounter:  2023  PCP: Mariella Jimenez APRN  ED Provider: NICOL Waldron    Historian: Patient    HPI:  Chief Complaint: Abdominal Pain    Context: Comfort Main is a 32 y.o. female who presents to the ED c/o right upper quadrant abdominal pain. Patient was seen at [facility] ER in [place] on 2023 where she was diagnosed with chronic cholecystitis and discharged home. She presented to the ER at [facility] on 2023 where she was again diagnosed with gallstones with chronic cholecystitis without obstruction and right upper quadrant abdominal pain.  Patient was discharged with pain medication and Zofran.  Patient saw her primary care physician today, Mariella HAMMONDS, where it was documented patient has outpatient follow-up with general surgery Dr. Katina Asif on 2023.  Per review of patient's chart at her visit today it was recommended that because she had not had an ultrasound specific for cholecystitis and because of her abnormal CT findings and failure of outpatient treatment/management she was advised to go to this facility for evaluation and pain control.  Patient states that she has had persistent pain in her right upper quadrant.  She has had little oral intake.  She has no prior abdominal surgeries.  HPI     REVIEW OF SYSTEMS  A chief complaint appropriate review of systems was completed and is negative except as noted in the HPI.     PAST MEDICAL HISTORY  Past Medical History:   Diagnosis Date    Asthma     as a child    Depression     undiagnosed    Multiple gestation 2020    twins    Urinary tract infection        PAST SURGICAL HISTORY  Past Surgical History:   Procedure Laterality Date     SECTION N/A 2020    Procedure:  SECTION PRIMARY;  Surgeon: Mahesh Ramon MD;   Location: [facility] LABOR DELIVERY;  Service: Obstetrics/Gynecology;  Laterality: N/A;     SECTION WITH TUBAL N/A 2022    FOOT SURGERY Right 2015       FAMILY HISTORY  Family History   Problem Relation Age of Onset    Hypertension Maternal Grandmother     Diabetes Maternal Grandmother     Hypertension Mother        SOCIAL HISTORY  Social History     Socioeconomic History    Marital status: Single    Number of children: 4    Highest education level: Associate degree: academic program   Tobacco Use    Smoking status: Every Day     Packs/day: 0.50     Years: 8.00     Pack years: 4.00     Types: Cigarettes    Smokeless tobacco: Never   Vaping Use    Vaping Use: Never used   Substance and Sexual Activity    Alcohol use: Not Currently     Comment: SOC    Drug use: Yes     Frequency: 7.0 times per week     Types: Marijuana    Sexual activity: Yes     Partners: Female, Male       ALLERGIES  Patient has no known allergies.    PHYSICAL EXAM  Physical Exam  Vitals and nursing note reviewed.   Constitutional:       General: She is not in acute distress.     Appearance: Normal appearance. She is not ill-appearing or toxic-appearing.   HENT:      Head: Normocephalic and atraumatic.      Nose: Nose normal.      Mouth/Throat:      Mouth: Mucous membranes are moist.   Eyes:      Extraocular Movements: Extraocular movements intact.   Cardiovascular:      Rate and Rhythm: Normal rate.   Pulmonary:      Effort: Pulmonary effort is normal.   Abdominal:      General: There is no distension.      Palpations: Abdomen is soft.      Tenderness: There is abdominal tenderness in the right upper quadrant. Positive signs include Plaza's sign.   Musculoskeletal:         General: Normal range of motion.      Cervical back: Normal range of motion and neck supple.   Skin:     General: Skin is warm and dry.   Neurological:      General: No focal deficit present.      Mental Status: She is alert.   Psychiatric:         Mood and Affect: Mood  normal.         Behavior: Behavior normal.     LAB RESULTS  Results for orders placed or performed during the hospital encounter of 09/08/23   Comprehensive Metabolic Panel    Specimen: Blood   Result Value Ref Range    Glucose 111 (H) 65 - 99 mg/dL    BUN 11 6 - 20 mg/dL    Creatinine 0.69 0.57 - 1.00 mg/dL    Sodium 142 136 - 145 mmol/L    Potassium 3.3 (L) 3.5 - 5.2 mmol/L    Chloride 101 98 - 107 mmol/L    CO2 25.0 22.0 - 29.0 mmol/L    Calcium 9.7 8.6 - 10.5 mg/dL    Total Protein 8.1 6.0 - 8.5 g/dL    Albumin 4.3 3.5 - 5.2 g/dL    ALT (SGPT) 54 (H) 1 - 33 U/L    AST (SGOT) 19 1 - 32 U/L    Alkaline Phosphatase 114 39 - 117 U/L    Total Bilirubin 0.4 0.0 - 1.2 mg/dL    Globulin 3.8 gm/dL    A/G Ratio 1.1 g/dL    BUN/Creatinine Ratio 15.9 7.0 - 25.0    Anion Gap 16.0 (H) 5.0 - 15.0 mmol/L    eGFR 119.9 >60.0 mL/min/1.73   Lipase    Specimen: Blood   Result Value Ref Range    Lipase 35 13 - 60 U/L   Urinalysis With Microscopic If Indicated (No Culture) - Urine, Clean Catch    Specimen: Urine, Clean Catch   Result Value Ref Range    Color, UA Yellow Yellow, Straw    Appearance, UA Cloudy (A) Clear    pH, UA 6.5 5.0 - 8.0    Specific Gravity, UA 1.015 1.001 - 1.030    Glucose, UA Negative Negative    Ketones, UA Trace (A) Negative    Bilirubin, UA Negative Negative    Blood, UA Large (3+) (A) Negative    Protein, UA >=300 mg/dL (3+) (A) Negative    Leuk Esterase, UA Trace (A) Negative    Nitrite, UA Negative Negative    Urobilinogen, UA 1.0 E.U./dL 0.2 - 1.0 E.U./dL   CBC Auto Differential    Specimen: Blood   Result Value Ref Range    WBC 12.41 (H) 3.40 - 10.80 10*3/mm3    RBC 5.13 3.77 - 5.28 10*6/mm3    Hemoglobin 14.1 12.0 - 15.9 g/dL    Hematocrit 44.8 34.0 - 46.6 %    MCV 87.3 79.0 - 97.0 fL    MCH 27.5 26.6 - 33.0 pg    MCHC 31.5 31.5 - 35.7 g/dL    RDW 13.6 12.3 - 15.4 %    RDW-SD 43.4 37.0 - 54.0 fl    MPV 10.5 6.0 - 12.0 fL    Platelets 394 140 - 450 10*3/mm3    Neutrophil % 72.3 42.7 - 76.0 %     Lymphocyte % 18.0 (L) 19.6 - 45.3 %    Monocyte % 8.5 5.0 - 12.0 %    Eosinophil % 0.6 0.3 - 6.2 %    Basophil % 0.2 0.0 - 1.5 %    Immature Grans % 0.4 0.0 - 0.5 %    Neutrophils, Absolute 8.97 (H) 1.70 - 7.00 10*3/mm3    Lymphocytes, Absolute 2.23 0.70 - 3.10 10*3/mm3    Monocytes, Absolute 1.06 (H) 0.10 - 0.90 10*3/mm3    Eosinophils, Absolute 0.07 0.00 - 0.40 10*3/mm3    Basophils, Absolute 0.03 0.00 - 0.20 10*3/mm3    Immature Grans, Absolute 0.05 0.00 - 0.05 10*3/mm3    nRBC 0.0 0.0 - 0.2 /100 WBC   Lactic Acid, Plasma    Specimen: Blood   Result Value Ref Range    Lactate 1.1 0.5 - 2.0 mmol/L   Urinalysis, Microscopic Only - Urine, Clean Catch    Specimen: Urine, Clean Catch   Result Value Ref Range    RBC, UA 0-2 None Seen, 0-2 /HPF    WBC, UA 6-12 (A) None Seen, 0-2 /HPF    Bacteria, UA Trace None Seen, Trace /HPF    Squamous Epithelial Cells, UA 7-12 (A) None Seen, 0-2 /HPF    Hyaline Casts, UA 13-20 0 - 6 /LPF    Methodology Automated Microscopy    POC Urine Pregnancy    Specimen: Urine   Result Value Ref Range    HCG, Urine, QL Negative Negative    Lot Number [id number]     Internal Positive Control Passed Positive, Passed    Internal Negative Control Passed Negative, Passed    Expiration Date 01/30/2025    Green Top (Gel)   Result Value Ref Range    Extra Tube Hold for add-ons.    Lavender Top   Result Value Ref Range    Extra Tube hold for add-on    Gold Top - SST   Result Value Ref Range    Extra Tube Hold for add-ons.    Light Blue Top   Result Value Ref Range    Extra Tube Hold for add-ons.        If labs were ordered, I independently reviewed the results and considered them in treating the patient.    RADIOLOGY  US Gallbladder   Final Result   Cholelithiasis with gallbladder wall thickening and pericholecystic fluid and a positive sonographic Plaza sign consistent with acute cholecystitis.      Multiple hemangiomas in the liver.      The abdominal aorta and inferior vena cava were not identified on  this exam.                     Electronically Signed: Zander Velazquez MD     9/8/2023 10:14 PM EDT     Workstation ID: [id number]        [x] Radiologist's Report Reviewed:  I ordered and independently reviewed the above noted radiographic studies.  See radiologist's dictation for official interpretation.      PROCEDURES    Procedures    No orders to display       MEDICATIONS GIVEN IN ER    Medications   Sodium Chloride (PF) 0.9 % 10 mL (has no administration in time range)   sodium chloride 0.9 % flush 10 mL (has no administration in time range)   sodium chloride 0.9 % bolus 1,000 mL (1,000 mL Intravenous New Bag 9/8/23 7880)   piperacillin-tazobactam (ZOSYN) 3.375 g in iso-osmotic dextrose 50 ml (premix) (3.375 g Intravenous New Bag 9/8/23 2014)       MEDICAL DECISION MAKING, PROGRESS, and CONSULTS   Medical Decision Making  Problems Addressed:  Acute cholecystitis: complicated acute illness or injury  Right upper quadrant abdominal pain: acute illness or injury    Amount and/or Complexity of Data Reviewed  Labs: ordered.  Radiology: ordered.    Risk  Prescription drug management.  Decision regarding hospitalization.        All labs have been independently reviewed by me.  All radiology studies have been reviewed by me and the radiologist dictating the report.  All EKG's have been independently viewed by me.    [] Discussed with radiology regarding test interpretation:    Discussion below represents my analysis of pertinent findings related to patient's condition, differential diagnosis, treatment plan and final disposition.    Differential diagnosis:  The differential diagnosis associated with the patient's presentation includes: Dyspepsia, viral gastroenteritis, constipation, medication side effects, psychiatric illness, irritable bowel syndrome, abdominal wall pain, gallbladder disease, pancreatitis, diverticulitis, appendicitis, kidney stone, UTI, hernia, gastroparesis, food poisoning, DKA, hepatitis, bowel  obstruction, colitis and others.      Additional sources  Discussed/ obtained information from independent historians:   [] Spouse  [] Parent  [] Family member  [] Friend  [] EMS   [] Other:  External (non-ED) record review:   [] Inpatient record:   [] Office record:   [] Outpatient record:   [] Prior Outpatient labs:   [] Prior Outpatient radiology:   [x] Primary Care record: Primary care visit from today reviewed with diagnosis of right upper quadrant pain and cholelithiasis   [x] Outside ED record: Personally reviewed ER visits from September 5, 2023 and September 6, 2023 demonstrating right upper quadrant pain and cholelithiasis   [] Other:   Patient's care impacted by:   [] Diabetes  [] Hypertension  [] Hyperlipidemia  [] Hypothyroidism   [] Coronary Artery Disease   [] COPD   [] Cancer   [] Obesity  [] GERD   [] Tobacco Abuse   [] Substance Abuse    [] Anxiety   [] Depression   [] Other:   Care significantly affected by Social Determinants of Health (housing and economic circumstances, unemployment)    [] Yes     [x] No   If yes, Patient's care significantly limited by  Social Determinants of Health including:   [] Inadequate housing   [] Low income   [] Alcoholism and drug addiction in family   [] Problems related to primary support group   [] Unemployment   [] Problems related to employment   [] Other Social Determinants of Health:     Shared decision making:  I reviewed workup performed in ED including labs and imaging. Based on findings, recommendation made for admission. Patient is agreeable to plan of care and hospital admission.      Orders placed during this visit:  Orders Placed This Encounter   Procedures    Blood Culture - Blood,    Blood Culture - Blood,    US Gallbladder    [place] Draw    Comprehensive Metabolic Panel    Lipase    Urinalysis With Microscopic If Indicated (No Culture) - Urine, Clean Catch    CBC Auto Differential    Lactic Acid, Plasma    Urinalysis, Microscopic Only - Urine,  Clean Catch    NPO Diet NPO Type: Strict NPO    Undress & Gown    POC Urine Pregnancy    Insert Peripheral IV    Insert Peripheral IV    Initiate Observation Status    ED Bed Request    CBC & Differential    Green Top (Gel)    Lavender Top    Gold Top - SST    Gray Top    Light Blue Top     ED Course:    ED Course as of 09/08/23 2323   Fri Sep 08, 2023   2321 In summary this is a 32-year-old female who presents to the ER after 2 prior trips to the ED and one visit to her primary care physician today with complaints of right upper quadrant abdominal pain.  Mild leukocytosis, remainder of labs without acute or emergent abnormalities.  Patient is currently on her menstrual period, UA without acute abnormalities.  Ultrasound of gallbladder demonstrates Cholelithiasis with gallbladder wall thickening and pericholecystic fluid and a positive sonographic Plaza sign consistent with acute cholecystitis.  Blood cultures obtained and patient initiated on Zosyn.  Hospitalist consulted for admission and agreeable to accept patient.  Patient agreeable to plan of care and hospital admission for further evaluation and treatment of her acute cholecystitis. [JG]      ED Course User Index  [JG] Salvatore Wilhelm PA            DIAGNOSIS  Final diagnoses:   Acute cholecystitis   Right upper quadrant abdominal pain       DISPOSITION    ED Disposition       ED Disposition   Decision to Admit    Condition   --    Comment   Level of Care: Telemetry [5]   Diagnosis: Acute cholecystitis [575.0.ICD-9-CM]   Admitting Physician: UZMA ANN [[id number]]   Attending Physician: UZMA ANN [[id number]]   Bed Request Comments: tele                 Please note that portions of this document were completed with voice recognition software.        Salvatore Wilhelm PA  09/08/23 2339

## 2023-09-09 NOTE — H&P
[facility] Hospital Medicine Services  HISTORY AND PHYSICAL    Patient Name: Comfort Main  : 1990  MRN: [id number]  Primary Care Physician: Mariella Jimenez APRN  Date of admission: 2023    Subjective   Subjective     Chief Complaint:  RUQ pain     HPI:  Comfort Main is a 32 y.o. female with past medical history significant for depression, tobacco abuse and marijuana use presents the ED with complaints of right upper quadrant pain.  Patient reports she was diagnosed with gallstones in .  However over the past week she has had worsening right upper quadrant pain along with nausea and vomiting.  She was evaluated at [facility] on 2023 where she was diagnosed with chronic cholecystitis and discharged home.  She presented to the ER at [facility] on 2023 where she was again diagnosed with gallstones and chronic cholecystitis without obstruction.  Patient was discharged on pain medication and Zofran.  She again reports no improvement of her symptoms and was evaluated by her PCP today.  She was given 30 mg of IM Toradol in the office.  She was advised to go to [facility] ED for evaluation and specifically ultrasound of her gallbladder.  Patient denies any known fever, chills, cough, shortness of breath, diarrhea, dysuria or chest pain.  Ultrasound obtained in the ED shows cholelithiasis with gallbladder wall thickening and pericholecystic fluid and a positive cinegraphic Plaza sign consistent with acute cholecystitis.  Multiple hemangiomas in the liver.  Patient will be admitted to [facility] under the care of the hospitalist for further evaluation and treatment.        Review of Systems   Constitutional:  Positive for appetite change. Negative for activity change, chills, diaphoresis, fatigue, fever and unexpected weight change.   HENT: Negative.     Eyes:  Negative for photophobia and visual  disturbance.   Respiratory:  Negative for cough and shortness of breath.    Cardiovascular:  Negative for chest pain, palpitations and leg swelling.   Gastrointestinal:  Positive for abdominal pain, nausea and vomiting. Negative for abdominal distention, blood in stool, constipation and diarrhea.   Genitourinary: Negative.    Musculoskeletal:  Negative for back pain, neck pain and neck stiffness.   Skin: Negative.    Neurological: Negative.    Psychiatric/Behavioral: Negative.            Personal History     Past Medical History:   Diagnosis Date    Asthma     as a child    Depression     undiagnosed    Multiple gestation 2020    twins    Urinary tract infection              Past Surgical History:   Procedure Laterality Date     SECTION N/A 2020    Procedure:  SECTION PRIMARY;  Surgeon: Mahesh Ramon MD;  Location: [facility] LABOR DELIVERY;  Service: Obstetrics/Gynecology;  Laterality: N/A;     SECTION WITH TUBAL N/A 2022    FOOT SURGERY Right 2015       Family History:  family history includes Diabetes in her maternal grandmother; Hypertension in her maternal grandmother and mother.     Social History:  reports that she has been smoking cigarettes. She has a 4.00 pack-year smoking history. She has never used smokeless tobacco. She reports that she does not currently use alcohol. She reports current drug use. Frequency: 7.00 times per week. Drug: Marijuana.  Social History     Social History Narrative    Not on file       Medications:  HYDROcodone-acetaminophen and ondansetron ODT    No Known Allergies    Objective   Objective     Vital Signs:   Temp:  [98.8 °F (37.1 °C)-99 °F (37.2 °C)] 98.8 °F (37.1 °C)  Heart Rate:  [130-132] 130  Resp:  [18] 18  BP: (130-141)/(100-126) 141/104    Physical Exam  Vitals and nursing note reviewed.   Constitutional:       General: She is not in acute distress.     Appearance: Normal appearance. She is not ill-appearing, toxic-appearing or  diaphoretic.   HENT:      Head: Normocephalic and atraumatic.      Nose: Nose normal.      Mouth/Throat:      Mouth: Mucous membranes are dry.      Pharynx: Oropharynx is clear.   Eyes:      Extraocular Movements: Extraocular movements intact.      Conjunctiva/sclera: Conjunctivae normal.      Pupils: Pupils are equal, round, and reactive to light.   Cardiovascular:      Rate and Rhythm: Normal rate and regular rhythm.      Pulses: Normal pulses.      Heart sounds: Normal heart sounds.   Pulmonary:      Effort: Pulmonary effort is normal.      Breath sounds: Normal breath sounds.   Abdominal:      General: Bowel sounds are normal. There is no distension.      Palpations: Abdomen is soft. There is no mass.      Tenderness: There is abdominal tenderness. There is no right CVA tenderness, left CVA tenderness, guarding or rebound.      Hernia: No hernia is present.      Comments: Right upper quadrant tenderness on exam   Musculoskeletal:         General: No swelling, tenderness, deformity or signs of injury. Normal range of motion.      Cervical back: Normal range of motion and neck supple.      Right lower leg: No edema.      Left lower leg: No edema.   Skin:     General: Skin is warm and dry.   Neurological:      General: No focal deficit present.      Mental Status: She is alert and oriented to person, place, and time. Mental status is at baseline.   Psychiatric:         Mood and Affect: Mood normal.         Behavior: Behavior normal.         Thought Content: Thought content normal.         Judgment: Judgment normal.          Result Review:  I have personally reviewed the results from the time of this admission to 9/8/2023 23:49 EDT and agree with these findings:  [x]  Laboratory list / accordion  [x]  Microbiology  [x]  Radiology  [x]  EKG/Telemetry   []  Cardiology/Vascular   []  Pathology  []  Old records  []  Other:  Most notable findings include:     LAB RESULTS:      Lab 09/08/23 2058 09/05/23  1130  09/05/23  1128   WBC 12.41*  --  13.89*   HEMOGLOBIN 14.1  --  13.8   HEMATOCRIT 44.8  --  42.5   PLATELETS 394  --  331   NEUTROS ABS 8.97*  --  10.71*   IMMATURE GRANS (ABS) 0.05  --  0.07*   LYMPHS ABS 2.23  --  2.08   MONOS ABS 1.06*  --  0.97*   EOS ABS 0.07  --  0.03   MCV 87.3  --  85.9   PROCALCITONIN  --   --  0.02   LACTATE 1.1 1.5  --          Lab 09/08/23 2058 09/05/23  1128   SODIUM 142 140   POTASSIUM 3.3* 3.8   CHLORIDE 101 104   CO2 25.0 22.2   ANION GAP 16.0* 13.8   BUN 11 8   CREATININE 0.69 0.58   EGFR 119.9 125.0   GLUCOSE 111* 115*   CALCIUM 9.7 9.7         Lab 09/08/23 2058 09/07/23  0027 09/05/23  1128   TOTAL PROTEIN 8.1  --  7.6   ALBUMIN 4.3  --  4.9   GLOBULIN 3.8  --  2.7   ALT (SGPT) 54*  --  36*   AST (SGOT) 19  --  12   BILIRUBIN 0.4  --  0.4   ALK PHOS 114  --  107   LIPASE 35 28 29         Lab 09/05/23  1128   HSTROP T <6                 Brief Urine Lab Results  (Last result in the past 365 days)        Color   Clarity   Blood   Leuk Est   Nitrite   Protein   CREAT   Urine HCG        09/08/23 2107               Negative             Microbiology Results (last 10 days)       ** No results found for the last 240 hours. **            US Gallbladder    Result Date: 9/8/2023  US GALLBLADDER Date of Exam: 9/8/2023 9:33 PM EDT Indication: RUQ pain. Comparison: No comparisons available. Technique: Grayscale and color Doppler ultrasound evaluation of the right upper quadrant was performed. Findings: Limited evaluation of the pancreatic head demonstrates no gross abnormality. The abdominal aorta inferior vena cava were not visualized on this exam. There are several hypodense lesions throughout the liver the appearance of which are consistent with hemangiomas. There is hepatopetal flow in the portal vein. There are multiple shadowing calculi within the gallbladder lumen. The gallbladder wall is thickened measuring 0.81 cm. There is small amount of pericholecystic fluid. The technologist  reports a positive sonographic Plaza sign. The common bile duct measures 0.56 cm. The right kidney measures 11.1 x 5.7 x 1.7 cm without hydronephrosis or renal calculi.     Impression: Cholelithiasis with gallbladder wall thickening and pericholecystic fluid and a positive sonographic Plaza sign consistent with acute cholecystitis. Multiple hemangiomas in the liver. The abdominal aorta and inferior vena cava were not identified on this exam. Electronically Signed: Zander Velazquez MD  9/8/2023 10:14 PM EDT  Workstation ID: [id number]         Assessment & Plan   Assessment & Plan       Acute cholecystitis    Depression    Marijuana use    Tobacco use    Hypokalemia      32-year-old female presents the ED from PCP office due to worsening right upper quadrant pain and nausea and vomiting.    1) acute cholecystitis  - Ultrasound of gallbladder mentioned above  - N.p.o.  - Continue Zosyn  - Pain control  - As needed antiemetics  - Consult general surgery in a.m.  - IV fluids    2) hypokalemia  - Replace per protocol  - Check magnesium  - Telemetry monitoring    3) depression  - On Zoloft    4) tobacco use      Marijuana use  - Cessation education provided  - Nicotine patch        DVT prophylaxis:  scds    CODE STATUS:  full code       Expected Discharge  TBD       This note has been completed as part of a split-shared workflow.     Signature: Electronically signed by CASSIUS Irizarry, 09/08/23, 11:55 PM EDT.    Total time spent: 65 mins  Time spent includes time reviewing chart, face-to-face time, counseling patient/family/caregiver, ordering medications/tests/procedures, communicating with other health care professionals, documenting clinical information in the electronic health record, and coordination of care.

## 2023-09-09 NOTE — ANESTHESIA POSTPROCEDURE EVALUATION
Patient: Comfort Main    Procedure Summary       Date: 09/09/23 Room / Location:  [facility] 11 /  [facility]    Anesthesia Start: 0941 Anesthesia Stop: 1115    Procedure: CHOLECYSTECTOMY LAPAROSCOPIC (Abdomen) Diagnosis:     Surgeons: Alejandro Christine MD Provider: Blanquita Odom DO    Anesthesia Type: general ASA Status: 2            Anesthesia Type: general    Vitals  Vitals Value Taken Time   /92 09/09/23 1115   Temp 98.2 °F (36.8 °C) 09/09/23 1115   Pulse 105 09/09/23 1115   Resp 14 09/09/23 1115   SpO2 99 % 09/09/23 1115           Post Anesthesia Care and Evaluation    Patient location during evaluation: PACU  Patient participation: complete - patient participated  Level of consciousness: awake and alert  Pain management: adequate    Airway patency: patent  Anesthetic complications: No anesthetic complications  PONV Status: none  Cardiovascular status: hemodynamically stable and acceptable  Respiratory status: nonlabored ventilation, acceptable and nasal cannula  Hydration status: acceptable

## 2023-09-09 NOTE — ANESTHESIA PROCEDURE NOTES
Airway  Urgency: elective    Date/Time: 9/9/2023 9:47 AM  Airway not difficult    General Information and Staff    Patient location during procedure: OR  CRNA/CAA: Debra Fuller CRNA    Indications and Patient Condition  Indications for airway management: airway protection    Preoxygenated: yes  MILS not maintained throughout  Mask difficulty assessment: 1 - vent by mask    Final Airway Details  Final airway type: endotracheal airway      Successful airway: ETT  Cuffed: yes   Successful intubation technique: direct laryngoscopy and video laryngoscopy  Facilitating devices/methods: intubating stylet  Endotracheal tube insertion site: oral  Blade: Chanel  Blade size: 3  ETT size (mm): 7.0  Cormack-Lehane Classification: grade I - full view of glottis  Placement verified by: chest auscultation and capnometry   Measured from: lips  ETT/EBT  to lips (cm): 22  Number of attempts at approach: 1  Assessment: lips, teeth, and gum same as pre-op and atraumatic intubation    Additional Comments  Negative epigastric sounds, Breath sound equal bilaterally with symmetric chest rise and fall

## 2023-09-09 NOTE — ANESTHESIA PREPROCEDURE EVALUATION
Anesthesia Evaluation     Patient summary reviewed and Nursing notes reviewed   no history of anesthetic complications:   NPO Solid Status: > 8 hours  NPO Liquid Status: > 2 hours           Airway   Mallampati: II  TM distance: >3 FB  Neck ROM: full  Possible difficult intubation  Dental - normal exam     Pulmonary - normal exam   (+) a smoker Current, asthma (childhood),  (-) sleep apnea  Cardiovascular - normal exam  Exercise tolerance: good (4-7 METS)    ECG reviewed    (-) valvular problems/murmurs, dysrhythmias      Neuro/Psych  (+) psychiatric history  (-) seizures, neuromuscular disease  GI/Hepatic/Renal/Endo    (+) obesity  (-) GERD, liver disease, no renal disease, diabetes    Musculoskeletal     (+) back pain  Abdominal    Substance History   (+) drug use (MJ)     OB/GYN          Other        ROS/Med Hx Other: Hgb 12.4 k 3.5                    Anesthesia Plan    ASA 2     general     (Risks and benefits of general anesthesia discussed with patient (including MI, CVA, death, recall, aspiration, oropharyngeal/dental damage), questions answered, agreeable to proceed.    )  intravenous induction     Anesthetic plan, risks, benefits, and alternatives have been provided, discussed and informed consent has been obtained with: patient.    Plan discussed with CRNA.    CODE STATUS:    Level Of Support Discussed With: Patient  Code Status (Patient has no pulse and is not breathing): CPR (Attempt to Resuscitate)  Medical Interventions (Patient has pulse or is breathing): Full Support

## 2023-09-09 NOTE — PLAN OF CARE
Problem: Adult Inpatient Plan of Care  Goal: Plan of Care Review  Outcome: Ongoing, Progressing  Goal: Patient-Specific Goal (Individualized)  Outcome: Ongoing, Progressing  Goal: Absence of Hospital-Acquired Illness or Injury  Outcome: Ongoing, Progressing  Goal: Optimal Comfort and Wellbeing  Outcome: Ongoing, Progressing  Goal: Readiness for Transition of Care  Outcome: Ongoing, Progressing  Intervention: Mutually Develop Transition Plan  Recent Flowsheet Documentation  Taken 9/8/2023 2341 by Koffi Vee, RN  Transportation Anticipated: family or friend will provide  Patient/Family Anticipated Services at Transition: none  Patient/Family Anticipates Transition to: home with family  Taken 9/8/2023 2340 by Koffi Vee, RN  Equipment Currently Used at Home: none   Goal Outcome Evaluation:

## 2023-09-09 NOTE — CONSULTS
General Surgery Consultation Note    Date of Service: 2023  Comfort Main  [id number]  1990      Referring Provider: Saul Feng MD    Location of Consult: Inpatient     Reason for Consultation: Abdominal pain, cholecystitis       History of Present Illness:  I am seeing, Comfort Main, in consultation at request of Saul Feng MD regarding abdominal pain and concern for cholecystitis.  She is a 32-year-old lady with history of depression who presents to [facility] with complaints of 6 days of abdominal pain.  She reports that she has had intermittent difficulty with right upper quadrant abdominal pain since  that she has attributed to gallbladder disease.  She describes this as intermittent pain in her right upper quadrant that is improved with nausea and vomiting.  Over the last 6 days her symptoms have been significantly worse.  She describes constant pain in her right upper quadrant associated with chills and sweating.  She has had episodes of nausea and vomiting over the last week, although did not have any yesterday.  Appetite has been decreased.  She reports that she has had some difficulty with constipation.  Only prior abdominal surgical history includes  section..      Problems Addressed this Visit          Gastrointestinal Abdominal     * (Principal) Acute cholecystitis - Primary     Other Visit Diagnoses       Right upper quadrant abdominal pain              Diagnoses         Codes Comments    Acute cholecystitis    -  Primary ICD-10-CM: K81.0  ICD-9-CM: 575.0     Right upper quadrant abdominal pain     ICD-10-CM: R10.11  ICD-9-CM: 789.01             PMHx:  Past Medical History:   Diagnosis Date    Asthma     as a child    Depression     undiagnosed    Multiple gestation 2020    twins    Urinary tract infection        Past Surgical History:  Past Surgical History:     SECTION    Procedure:  SECTION PRIMARY;   Surgeon: Mahesh Ramon MD;  Location: [facility] LABOR DELIVERY;  Service: Obstetrics/Gynecology;  Laterality: N/A;     SECTION WITH TUBAL    FOOT SURGERY       Allergies:  No Known Allergies    Medications:  No current facility-administered medications on file prior to encounter.     Current Outpatient Medications on File Prior to Encounter   Medication Sig Dispense Refill    HYDROcodone-acetaminophen (NORCO) 5-325 MG per tablet Take 1 tablet by mouth Every 6 (Six) Hours As Needed.      ondansetron ODT (ZOFRAN-ODT) 4 MG disintegrating tablet Place 1 tablet on the tongue Every 8 (Eight) Hours As Needed for Nausea or Vomiting.      sertraline (ZOLOFT) 50 MG tablet Take 1 tablet by mouth Daily.           Current Facility-Administered Medications:     ! Home medications stored in pharmacy; contact pharmacy prior to patient discharge, , Does not apply, Q12H, Dakotah Shea, [facility]    sennosides-docusate (PERICOLACE) 8.6-50 MG per tablet 2 tablet, 2 tablet, Oral, BID **AND** polyethylene glycol (MIRALAX) packet 17 g, 17 g, Oral, Daily PRN **AND** bisacodyl (DULCOLAX) EC tablet 5 mg, 5 mg, Oral, Daily PRN **AND** bisacodyl (DULCOLAX) suppository 10 mg, 10 mg, Rectal, Daily PRN, Netta, Amie, APRN    Calcium Replacement - Follow Nurse / BPA Driven Protocol, , Does not apply, PRN, Netta, Amie, APRN    Magnesium Standard Dose Replacement - Follow Nurse / BPA Driven Protocol, , Does not apply, PRN, Netta, Amie, APRN    morphine injection 1 mg, 1 mg, Intravenous, Q3H PRN, 1 mg at 23 0742 **AND** naloxone (NARCAN) injection 0.4 mg, 0.4 mg, Intravenous, Q5 Min PRN, Netta, Amie, APRN    nicotine (NICODERM CQ) 14 MG/24HR patch 1 patch, 1 patch, Transdermal, Q24H, Netta, Amie, APRN, 1 patch at 23 0744    nitroglycerin (NITROSTAT) SL tablet 0.4 mg, 0.4 mg, Sublingual, Q5 Min PRN, Netta, Amie, APRN    ondansetron (ZOFRAN) tablet 4 mg, 4 mg, Oral, Q6H PRN **OR** ondansetron (ZOFRAN)  "injection 4 mg, 4 mg, Intravenous, Q6H PRN, Netta, Amie, APRN    Phosphorus Replacement - Follow Nurse / BPA Driven Protocol, , Does not apply, PRN, Netta, Amie, APRN    piperacillin-tazobactam (ZOSYN) 3.375 g in iso-osmotic dextrose 50 ml (premix), 3.375 g, Intravenous, Q8H, Netta, Amie, APRN, 3.375 g at 09/09/23 0601    Potassium Replacement - Follow Nurse / BPA Driven Protocol, , Does not apply, PRN, Netta, Amie, APRN    sertraline (ZOLOFT) tablet 50 mg, 50 mg, Oral, Daily, Netta, Amie, APRN    Sodium Chloride (PF) 0.9 % 10 mL, 10 mL, Intravenous, PRN, Aayush Lerner MD    [COMPLETED] Insert Peripheral IV, , , Once **AND** sodium chloride 0.9 % flush 10 mL, 10 mL, Intravenous, PRN, Salvatore Wilhelm PA    sodium chloride 0.9 % infusion, 100 mL/hr, Intravenous, Continuous, Netta, Amie, APRN, Last Rate: 100 mL/hr at 09/09/23 0747, 100 mL/hr at 09/09/23 0747      Family History:  Family History   Problem Relation Age of Onset    Hypertension Maternal Grandmother     Diabetes Maternal Grandmother     Hypertension Mother        Social History: Pt lives in [facility].    Tobacco use: Smokes 1/2 pack/day   EtOH use : Denies    Illicit drug use: Uses marijuana frequently      Review of Systems:   Constitutional: No fevers, chills or malaise   Eyes: Denies visual changes    Cardiovascular: Denies chest pain, palpitations   Pulmonary: Denies cough or shortness of breath   Abdominal/ GI: See HPI    Genitourinary: Denies dysuria or hematuria   Musculoskeletal: Denies any but chronic joint aches, pains or deformities   Psychiatric: No recent mood changes   Neurologic: No paresthesias or loss of function    BP (!) 147/108 (BP Location: Left arm, Patient Position: Sitting)   Pulse 85   Temp 97.9 °F (36.6 °C) (Oral)   Resp 18   Ht 175.3 cm (69\")   Wt 117 kg (258 lb)   LMP 08/15/2023   SpO2 98%   Breastfeeding No   BMI 38.10 kg/m²   Body mass index is 38.1 kg/m².    Gen: Awake, " alert, sitting up in the bedside chair, appears quite uncomfortable and is frequently moving around  Head: Normocephalic, atraumatic.   Eyes: Pupils equal, round, react to light and accommodation.   Mouth: Oral mucosa without lesions,   Neck: No masses, lymphadenopathy or carotid bruits bilaterally   CV: Rhythm and rate regular, no murmurs, rubs or gallops  Lungs: Clear to auscultation bilaterally, not labored on room air   Abdomen: Obese, soft, focally tender to palpation in the right upper quadrant and some very mild tenderness to palpation otherwise diffusely in the upper abdomen, no obvious scars  Groin : No obvious hernias bilaterally   Extremities:  No cyanosis, clubbing or edema bilaterally  Lymphatics: No abnormal lymphadenopathy appreciated   Neurologic: No gross deficits         CBC  Results from last 7 days   Lab Units 09/09/23  0406   WBC 10*3/mm3 9.29   HEMOGLOBIN g/dL 12.4   HEMATOCRIT % 39.3   PLATELETS 10*3/mm3 321       CMP  Results from last 7 days   Lab Units 09/09/23  0406   SODIUM mmol/L 140   POTASSIUM mmol/L 3.5   CHLORIDE mmol/L 103   CO2 mmol/L 25.0   BUN mg/dL 10   CREATININE mg/dL 0.51*   CALCIUM mg/dL 8.7   BILIRUBIN mg/dL 0.5   ALK PHOS U/L 90   ALT (SGPT) U/L 39*   AST (SGOT) U/L 14   GLUCOSE mg/dL 101*       Radiology  Imaging Results (Last 72 Hours)       Procedure Component Value Units Date/Time    US Gallbladder [[id number]] Collected: 09/08/23 2211     Updated: 09/08/23 2217    Narrative:      US GALLBLADDER    Date of Exam: 9/8/2023 9:33 PM EDT    Indication: RUQ pain.    Comparison: No comparisons available.    Technique: Grayscale and color Doppler ultrasound evaluation of the right upper quadrant was performed.      Findings:      Limited evaluation of the pancreatic head demonstrates no gross abnormality.    The abdominal aorta inferior vena cava were not visualized on this exam.    There are several hypodense lesions throughout the liver the appearance of which are consistent  with hemangiomas.  There is hepatopetal flow in the portal vein.    There are multiple shadowing calculi within the gallbladder lumen. The gallbladder wall is thickened measuring 0.81 cm. There is small amount of pericholecystic fluid.    The technologist reports a positive sonographic Plaza sign.    The common bile duct measures 0.56 cm.    The right kidney measures 11.1 x 5.7 x 1.7 cm without hydronephrosis or renal calculi.      Impression:      Cholelithiasis with gallbladder wall thickening and pericholecystic fluid and a positive sonographic Plaza sign consistent with acute cholecystitis.    Multiple hemangiomas in the liver.    The abdominal aorta and inferior vena cava were not identified on this exam.              Electronically Signed: Zander Velazquez MD    9/8/2023 10:14 PM EDT    Workstation ID: [id number]                Results Review: I have personally reviewed all of the recent lab and imaging results available at this time.     She is afebrile and vital signs are stable    Labs demonstrate a white blood cell count of 9.  Hemoglobin is 12.  Platelets are 321.  Total bilirubin is 0.5.  Transaminases are not elevated.    I have personally reviewed a right upper quadrant ultrasound.  This demonstrates findings of cholelithiasis as well as concern for gallbladder wall thickening and pericholecystic fluid.  Common bile duct is not dilated at 5 mm    I have personally reviewed a CT scan of the abdomen and pelvis.  This demonstrates a distended gallbladder with gallbladder wall thickening    Assessment:  Mrs. Main is a 32-year-old lady with history of depression, tobacco abuse, and marijuana use with acute cholecystitis.  Her clinical history and work-up are concerning for cholecystitis.  I had a long discussion with her regarding treatment options and have recommended to proceed to the operating room today for cholecystectomy    Plan:  -Keep n.p.o.  -IV antibiotics  -Plan to proceed to the operating  room today for laparoscopic cholecystectomy    I had a long discussion with the patient regarding the risks, benefits, and alternatives to the procedure including specifically discussing the risks of bleeding, infection, damage to adjacent structures, possible need for further operations, possible need for conversion to an open operation, risks of common bile duct injury and risk of bile leak after the procedure. They wish to proceed.        I discussed the patient's findings and my recommendations with the patient and/or family, as well as the primary team     Alejandro Christine MD  09/09/23  07:57 EDT      Part of this note may be an electronic transcription/translation of spoken language to printed text using the Dragon Dictation System.

## 2023-09-09 NOTE — PROGRESS NOTES
[facility] Medicine Services  PROGRESS NOTE    Patient Name: Comfort Main  : 1990  MRN: [id number]    Date of Admission: 2023  Primary Care Physician: Mariella Jimenez APRN    Subjective   Subjective     CC: Abdominal pain    HPI: Notes abdominal pain. Nausea. No f/c/sweats.    ROS:  As above     Objective   Objective     Vital Signs:   Temp:  [97.8 °F (36.6 °C)-99 °F (37.2 °C)] 97.9 °F (36.6 °C)  Heart Rate:  [] 85  Resp:  [16-18] 18  BP: (130-157)/(100-126) 147/108     Physical Exam:  NAD, alert and oriented  OP clear, MMM  Neck supple  No LAD  RRR  CTAB  +BS ,soft, TTP epigastrium/RUQ  No c/c/e  No rashes  MILLIGAN  Normal affect    Results Reviewed:  LAB RESULTS:      Lab 23  04023  1130 23  1128   WBC 9.29 12.41*  --  13.89*   HEMOGLOBIN 12.4 14.1  --  13.8   HEMATOCRIT 39.3 44.8  --  42.5   PLATELETS 321 394  --  331   NEUTROS ABS 6.25 8.97*  --  10.71*   IMMATURE GRANS (ABS) 0.03 0.05  --  0.07*   LYMPHS ABS 1.98 2.23  --  2.08   MONOS ABS 0.92* 1.06*  --  0.97*   EOS ABS 0.08 0.07  --  0.03   MCV 86.9 87.3  --  85.9   PROCALCITONIN  --   --   --  0.02   LACTATE  --  1.1 1.5  --          Lab 23  0406 23  1128   SODIUM 140 142 140   POTASSIUM 3.5 3.3* 3.8   CHLORIDE 103 101 104   CO2 25.0 25.0 22.2   ANION GAP 12.0 16.0* 13.8   BUN 10 11 8   CREATININE 0.51* 0.69 0.58   EGFR 129.0 119.9 125.0   GLUCOSE 101* 111* 115*   CALCIUM 8.7 9.7 9.7   MAGNESIUM 2.1  --   --          Lab 23  0406 238 237 23  1128   TOTAL PROTEIN 6.8 8.1  --  7.6   ALBUMIN 3.6 4.3  --  4.9   GLOBULIN 3.2 3.8  --  2.7   ALT (SGPT) 39* 54*  --  36*   AST (SGOT) 14 19  --  12   BILIRUBIN 0.5 0.4  --  0.4   ALK PHOS 90 114  --  107   LIPASE  --  35 28 29         Lab 23  1128   HSTROP T <6             Lab 23  0406   ABO TYPING O   RH TYPING Positive   ANTIBODY SCREEN Negative          Brief Urine Lab Results  (Last result in the past 365 days)        Color   Clarity   Blood   Leuk Est   Nitrite   Protein   CREAT   Urine HCG        09/08/23 2107               Negative               Microbiology Results Abnormal       None            US Gallbladder    Result Date: 9/8/2023  US GALLBLADDER Date of Exam: 9/8/2023 9:33 PM EDT Indication: RUQ pain. Comparison: No comparisons available. Technique: Grayscale and color Doppler ultrasound evaluation of the right upper quadrant was performed. Findings: Limited evaluation of the pancreatic head demonstrates no gross abnormality. The abdominal aorta inferior vena cava were not visualized on this exam. There are several hypodense lesions throughout the liver the appearance of which are consistent with hemangiomas. There is hepatopetal flow in the portal vein. There are multiple shadowing calculi within the gallbladder lumen. The gallbladder wall is thickened measuring 0.81 cm. There is small amount of pericholecystic fluid. The technologist reports a positive sonographic Plaza sign. The common bile duct measures 0.56 cm. The right kidney measures 11.1 x 5.7 x 1.7 cm without hydronephrosis or renal calculi.     Impression: Cholelithiasis with gallbladder wall thickening and pericholecystic fluid and a positive sonographic Plaza sign consistent with acute cholecystitis. Multiple hemangiomas in the liver. The abdominal aorta and inferior vena cava were not identified on this exam. Electronically Signed: Zander Velazquez MD  9/8/2023 10:14 PM EDT  Workstation ID: [id number]         Current medications:  Scheduled Meds:Pharmacy Consult, , Does not apply, Q12H  nicotine, 1 patch, Transdermal, Q24H  piperacillin-tazobactam, 3.375 g, Intravenous, Q8H  senna-docusate sodium, 2 tablet, Oral, BID  sertraline, 50 mg, Oral, Daily      Continuous Infusions:sodium chloride, 100 mL/hr, Last Rate: 100 mL/hr (09/09/23 0775)      PRN Meds:.  senna-docusate sodium **AND**  polyethylene glycol **AND** bisacodyl **AND** bisacodyl    Calcium Replacement - Follow Nurse / BPA Driven Protocol    Magnesium Standard Dose Replacement - Follow Nurse / BPA Driven Protocol    Morphine **AND** naloxone    nitroglycerin    ondansetron **OR** ondansetron    Phosphorus Replacement - Follow Nurse / BPA Driven Protocol    Potassium Replacement - Follow Nurse / BPA Driven Protocol    Sodium Chloride (PF)    [COMPLETED] Insert Peripheral IV **AND** sodium chloride    Assessment & Plan   Assessment & Plan     Active Hospital Problems    Diagnosis  POA    **Acute cholecystitis [K81.0]  Yes    Depression [F32.A]  Yes    Marijuana use [F12.90]  Yes    Tobacco use [Z72.0]  Yes    Hypokalemia [E87.6]  Yes      Resolved Hospital Problems   No resolved problems to display.        Brief Hospital Course to date:  Comfort Main is a 32 y.o. female     Acute cholecystitis  Leukocytosis  -zosyn, WBC better  -for CCY today  Expected Discharge Location and Transportation: Home  Expected Discharge   Expected Discharge Date: 9/10/2023; Expected Discharge Time:      DVT prophylaxis:  Mechanical DVT prophylaxis orders are present.     AM-PAC 6 Clicks Score (PT): 24 (09/09/23 0000)    CODE STATUS:   Code Status and Medical Interventions:   Ordered at: 09/08/23 0382     Level Of Support Discussed With:    Patient     Code Status (Patient has no pulse and is not breathing):    CPR (Attempt to Resuscitate)     Medical Interventions (Patient has pulse or is breathing):    Full Support       Saul Feng MD  09/09/23

## 2023-09-09 NOTE — OP NOTE
Operative Report    Patient Name:  Comfort Main  YOB: 1990  [id number]  9/9/2023      PREOPERATIVE DIAGNOSIS: Acute on chronic cholecystitis       POSTOPERATIVE DIAGNOSIS: Same        PROCEDURE PERFORMED:     Laparoscopic cholecystectomy        SURGEON: Alejandro Christine MD      ASSISTANT: None        SPECIMENS: Gallbladder and contents       ESTIMATED BLOOD LOSS: 50 mL       ANESTHESIA: General.        FINDINGS:     1. Critical view of safety established prior to ligation of cystic structures.  There was significant wall thickening of the gallbladder which made dissection more tedious       INDICATIONS:      The patient is a 32 y.o. female with a history of abdominal pain and a clinical diagnosis consistent with acute cholecystitis. Pre-operative imaging including U/S CT scan confirmed the diagnosis. The risks, benefits and alternatives of laparoscopic cholecystectomy were discussed with the patient and their family preoperatively and they agreed to proceed.        DESCRIPTION OF PROCEDURE:     The patient was taken to the operating room and positioned supine on the operating room table. General anesthesia was initiated. The patient was prepped and draped in the usual sterile fashion. Antibiotics were given preoperatively. SCDs were properly placed on the patient and turned on. An orogastric tube was placed by the anesthesiologist with return of gastric content prior to start of the case.     Local anesthetic was injected prior to all skin incisions. Using an 11 blade scalpel, a small stab incision was made in the patient's left upper quadrant at Medrano's point. A Veress needle was then inserted into the peritoneal cavity at this site, with aspiration and water-drop test reassuring. The abdomen was insufflated with carbon dioxide gas and pneumoperitoneum was established at 15 mmHg. A periumbilical skin incision was then made with an 11 blade scalpel. Next utilizing a 12 mm Optiview  Trocar with a 10 mm 0 degree laparoscope, the abdomen was then entered at the periumbilical site under direct laparoscopic visualization using an Optiview technique. The abdomen was then surveyed with special attention to the viscera underlying the entrance and Veress needle insertion site which were both found to be free of injury. The Veress needle was then removed. Next, under direct laparoscopic visualization three additional 5 mm trocars were placed in the right upper quadrant. The patient was then positioned in the head-up position with the table tilted to the left.     The gallbladder was noted to be quite distended and initially could not be grasped.  The gallbladder was incised at the dome and this was aspirated.  Following this the gallbladder could be manipulated.  There was significant gallbladder wall thickening and evidence of chronic inflammation which made dissection more tedious.  The fundus of the gallbladder was retracted cephalad while the infundibulum of the gallbladder was retracted laterally. Using a combination of hook cautery as well as a Maryland dissector, the peritoneum surrounding the cystic pedicle was stripped. Cystic structures were dissected. A critical view of safety was established, meanin and only 2 structures were visualized entering the gallbladder, all fibrous and fatty tissue between the cystic artery and cystic duct were cleared, and the posterior one-third of the gallbladder was removed from the cystic plate. Next 2 clips were placed on the distal cystic duct, 1 clip was placed on the proximal cystic duct, and the duct was transected with laparoscopic scissors.  A single PDS Endoloop was applied to the cystic duct stump below the clips.  Next 2 clips were placed on the proximal cystic artery, 1 clip was placed on the distal cystic artery and this was transected with laparoscopic scissors. Next the gallbladder was removed from the cystic plate using hook electrocautery,  this was quite tedious as the gallbladder was densely adherent to the cystic plate and there was significant posterior wall thickening.     A 5 mm 30 degree laparoscope was then inserted through the subxiphoid trocar site to visualize the placement of the gallbladder within an Endo Catch bag and then extraction of the gallbladder through the periumbilical trocar site utilizing the Endo Catch bag.  The fascia of the periumbilical trocar site had to be extended somewhat.  Instruments were returned to their native positions. Hemostasis of the cystic plate was confirmed using electrocautery. The clipped cystic structures were carefully examined and there was no sign of bilious or bloody drainage. The table was then leveled and limited irrigation of the right upper quadrant was performed with normal saline and hemostasis again confirmed. Next, the fascia of the periumbilical trocar site was closed using multiple figure-of-eight 0 Vicryl sutures with a Jared-Felton device under direct laparoscopic visualization.  The 5 mm trocars were removed under direct laparoscopic visualization and pneumoperitoneum was released.     All wound sites were irrigated and hemostasis confirmed. The skin incisions were then closed using a 4-0 Monocryl suture in the subcuticular layer. Mastisol and Steri-Strips were then applied to each incision site with a clean and dry dressing over this.    After the procedure, the patient was awakened, extubated, and taken to the postoperative anesthesia care unit for recovery. All needle, instrument, and lap counts were correct. I was personally present and performed all portions of the procedure. There were no immediate complications         Alejandro Christine MD  9/9/2023  11:01 EDT

## 2023-09-09 NOTE — PROGRESS NOTES
"Patient Name:  Comfort Main  YOB: 1990  [id number]    Surgery Post - Operative Note    Date of visit: 9/9/2023      Subjective: Resting in bed. Pain controlled. Feels better        Objective:    /93   Pulse 89   Temp 98.6 °F (37 °C) (Oral)   Resp 20   Ht 175.3 cm (69\")   Wt 117 kg (258 lb)   LMP 08/15/2023   SpO2 100%   Breastfeeding No   BMI 38.10 kg/m²     CV:  Regular rate and rhythm   L:  Clear to auscultation bilaterally. Not labored on O2 by NC   ABD:  Soft, appropriately tender. Dressings  clean, dry and intact   EXT:  No cyanosis, clubbing or edema        Assessment/ Plan:     Recovering well after cholecystectomy. Continue Pulmonary toilet        Alejandro Christine MD  9/9/2023  16:37 EDT    "

## 2023-09-09 NOTE — PLAN OF CARE
Problem: Adult Inpatient Plan of Care  Goal: Plan of Care Review  Outcome: Ongoing, Progressing  Goal: Patient-Specific Goal (Individualized)  Outcome: Ongoing, Progressing  Goal: Absence of Hospital-Acquired Illness or Injury  Outcome: Ongoing, Progressing  Intervention: Identify and Manage Fall Risk  Recent Flowsheet Documentation  Taken 9/9/2023 1400 by Blanquita Persaud RN  Safety Promotion/Fall Prevention:   activity supervised   assistive device/personal items within reach   clutter free environment maintained   room organization consistent   safety round/check completed   toileting scheduled  Taken 9/9/2023 1200 by Blanquita Persaud RN  Safety Promotion/Fall Prevention: patient off unit  Taken 9/9/2023 1000 by Blanquita Persaud RN  Safety Promotion/Fall Prevention: patient off unit  Taken 9/9/2023 0900 by Blanquita Persaud RN  Safety Promotion/Fall Prevention: patient off unit  Taken 9/9/2023 0800 by Blanquita Persaud RN  Safety Promotion/Fall Prevention:   activity supervised   clutter free environment maintained   assistive device/personal items within reach   room organization consistent   safety round/check completed   toileting scheduled  Intervention: Prevent Skin Injury  Recent Flowsheet Documentation  Taken 9/9/2023 1400 by Blanquita Persaud RN  Body Position: position changed independently  Taken 9/9/2023 0800 by Blanquita Persaud RN  Body Position: sitting up in bed  Intervention: Prevent and Manage VTE (Venous Thromboembolism) Risk  Recent Flowsheet Documentation  Taken 9/9/2023 1400 by Blanquita Persaud RN  Activity Management: up ad luke  Taken 9/9/2023 0800 by Blanquita Persaud RN  Activity Management: up ad luke  Goal: Optimal Comfort and Wellbeing  Outcome: Ongoing, Progressing  Intervention: Monitor Pain and Promote Comfort  Recent Flowsheet Documentation  Taken 9/9/2023 0800 by Blanquita Persaud RN  Pain Management Interventions:   care clustered   see MAR  Intervention: Provide Person-Centered  Care  Recent Flowsheet Documentation  Taken 9/9/2023 0800 by Blanquita Persaud RN  Trust Relationship/Rapport: care explained  Goal: Readiness for Transition of Care  Outcome: Ongoing, Progressing     Problem: Pain Acute  Goal: Acceptable Pain Control and Functional Ability  Outcome: Ongoing, Progressing  Intervention: Develop Pain Management Plan  Recent Flowsheet Documentation  Taken 9/9/2023 0800 by Blanquita Persaud RN  Pain Management Interventions:   care clustered   see MAR   Goal Outcome Evaluation:

## 2023-09-10 ENCOUNTER — READMISSION MANAGEMENT (OUTPATIENT)
Dept: CALL CENTER | Facility: HOSPITAL | Age: 33
End: 2023-09-10
Payer: COMMERCIAL

## 2023-09-10 VITALS
HEIGHT: 69 IN | OXYGEN SATURATION: 96 % | SYSTOLIC BLOOD PRESSURE: 163 MMHG | DIASTOLIC BLOOD PRESSURE: 106 MMHG | HEART RATE: 90 BPM | TEMPERATURE: 97.7 F | BODY MASS INDEX: 38.21 KG/M2 | RESPIRATION RATE: 18 BRPM | WEIGHT: 258 LBS

## 2023-09-10 LAB
ALBUMIN SERPL-MCNC: 3.5 G/DL (ref 3.5–5.2)
ALBUMIN/GLOB SERPL: 1.2 G/DL
ALP SERPL-CCNC: 86 U/L (ref 39–117)
ALT SERPL W P-5'-P-CCNC: 49 U/L (ref 1–33)
ANION GAP SERPL CALCULATED.3IONS-SCNC: 6 MMOL/L (ref 5–15)
AST SERPL-CCNC: 33 U/L (ref 1–32)
BASOPHILS # BLD AUTO: 0.02 10*3/MM3 (ref 0–0.2)
BASOPHILS NFR BLD AUTO: 0.2 % (ref 0–1.5)
BILIRUB SERPL-MCNC: 0.4 MG/DL (ref 0–1.2)
BUN SERPL-MCNC: 8 MG/DL (ref 6–20)
BUN/CREAT SERPL: 14 (ref 7–25)
CALCIUM SPEC-SCNC: 8.8 MG/DL (ref 8.6–10.5)
CHLORIDE SERPL-SCNC: 106 MMOL/L (ref 98–107)
CO2 SERPL-SCNC: 29 MMOL/L (ref 22–29)
CREAT SERPL-MCNC: 0.57 MG/DL (ref 0.57–1)
DEPRECATED RDW RBC AUTO: 43.8 FL (ref 37–54)
EGFRCR SERPLBLD CKD-EPI 2021: 125.6 ML/MIN/1.73
EOSINOPHIL # BLD AUTO: 0.04 10*3/MM3 (ref 0–0.4)
EOSINOPHIL NFR BLD AUTO: 0.4 % (ref 0.3–6.2)
ERYTHROCYTE [DISTWIDTH] IN BLOOD BY AUTOMATED COUNT: 13.6 % (ref 12.3–15.4)
GLOBULIN UR ELPH-MCNC: 3 GM/DL
GLUCOSE SERPL-MCNC: 109 MG/DL (ref 65–99)
HCT VFR BLD AUTO: 36.5 % (ref 34–46.6)
HGB BLD-MCNC: 11.4 G/DL (ref 12–15.9)
IMM GRANULOCYTES # BLD AUTO: 0.04 10*3/MM3 (ref 0–0.05)
IMM GRANULOCYTES NFR BLD AUTO: 0.4 % (ref 0–0.5)
LYMPHOCYTES # BLD AUTO: 1.69 10*3/MM3 (ref 0.7–3.1)
LYMPHOCYTES NFR BLD AUTO: 15.4 % (ref 19.6–45.3)
MCH RBC QN AUTO: 27.3 PG (ref 26.6–33)
MCHC RBC AUTO-ENTMCNC: 31.2 G/DL (ref 31.5–35.7)
MCV RBC AUTO: 87.3 FL (ref 79–97)
MONOCYTES # BLD AUTO: 0.92 10*3/MM3 (ref 0.1–0.9)
MONOCYTES NFR BLD AUTO: 8.4 % (ref 5–12)
NEUTROPHILS NFR BLD AUTO: 75.2 % (ref 42.7–76)
NEUTROPHILS NFR BLD AUTO: 8.24 10*3/MM3 (ref 1.7–7)
NRBC BLD AUTO-RTO: 0 /100 WBC (ref 0–0.2)
PLATELET # BLD AUTO: 306 10*3/MM3 (ref 140–450)
PMV BLD AUTO: 10 FL (ref 6–12)
POTASSIUM SERPL-SCNC: 4.1 MMOL/L (ref 3.5–5.2)
POTASSIUM SERPL-SCNC: 4.4 MMOL/L (ref 3.5–5.2)
PROT SERPL-MCNC: 6.5 G/DL (ref 6–8.5)
RBC # BLD AUTO: 4.18 10*6/MM3 (ref 3.77–5.28)
SODIUM SERPL-SCNC: 141 MMOL/L (ref 136–145)
WBC NRBC COR # BLD: 10.95 10*3/MM3 (ref 3.4–10.8)

## 2023-09-10 PROCEDURE — 80053 COMPREHEN METABOLIC PANEL: CPT | Performed by: SURGERY

## 2023-09-10 PROCEDURE — 85025 COMPLETE CBC W/AUTO DIFF WBC: CPT | Performed by: SURGERY

## 2023-09-10 PROCEDURE — 25010000002 HYDROMORPHONE PER 4 MG: Performed by: SURGERY

## 2023-09-10 PROCEDURE — G0378 HOSPITAL OBSERVATION PER HR: HCPCS

## 2023-09-10 PROCEDURE — 25010000002 PIPERACILLIN SOD-TAZOBACTAM PER 1 G: Performed by: SURGERY

## 2023-09-10 PROCEDURE — 25010000002 HEPARIN (PORCINE) PER 1000 UNITS: Performed by: SURGERY

## 2023-09-10 RX ORDER — AMOXICILLIN AND CLAVULANATE POTASSIUM 875; 125 MG/1; MG/1
1 TABLET, FILM COATED ORAL 2 TIMES DAILY
Qty: 10 TABLET | Refills: 0 | Status: CANCELLED | OUTPATIENT
Start: 2023-09-10 | End: 2023-09-15

## 2023-09-10 RX ORDER — AMOXICILLIN 250 MG
2 CAPSULE ORAL 2 TIMES DAILY PRN
Qty: 6 TABLET | Refills: 0 | Status: SHIPPED | OUTPATIENT
Start: 2023-09-10

## 2023-09-10 RX ORDER — OXYCODONE HYDROCHLORIDE AND ACETAMINOPHEN 5; 325 MG/1; MG/1
2 TABLET ORAL EVERY 8 HOURS PRN
Qty: 6 TABLET | Refills: 0 | Status: SHIPPED | OUTPATIENT
Start: 2023-09-10

## 2023-09-10 RX ADMIN — OXYCODONE HYDROCHLORIDE AND ACETAMINOPHEN 2 TABLET: 5; 325 TABLET ORAL at 08:44

## 2023-09-10 RX ADMIN — PIPERACILLIN SODIUM AND TAZOBACTAM SODIUM 3.38 G: 3; .375 INJECTION, SOLUTION INTRAVENOUS at 06:56

## 2023-09-10 RX ADMIN — SODIUM CHLORIDE, POTASSIUM CHLORIDE, SODIUM LACTATE AND CALCIUM CHLORIDE 125 ML/HR: 600; 310; 30; 20 INJECTION, SOLUTION INTRAVENOUS at 05:17

## 2023-09-10 RX ADMIN — HYDROMORPHONE HYDROCHLORIDE 0.5 MG: 1 INJECTION, SOLUTION INTRAMUSCULAR; INTRAVENOUS; SUBCUTANEOUS at 05:16

## 2023-09-10 RX ADMIN — HYDROMORPHONE HYDROCHLORIDE 0.5 MG: 1 INJECTION, SOLUTION INTRAMUSCULAR; INTRAVENOUS; SUBCUTANEOUS at 02:49

## 2023-09-10 RX ADMIN — HEPARIN SODIUM 5000 UNITS: 5000 INJECTION, SOLUTION INTRAVENOUS; SUBCUTANEOUS at 06:56

## 2023-09-10 NOTE — OUTREACH NOTE
Prep Survey      Flowsheet Row Responses   [facility] patient discharged from? [place]   Is LACE score < 7 ? Yes   Eligibility [facility]   Date of Admission 09/08/23   Date of Discharge 09/10/23   Discharge Disposition Home or Self Care   Discharge diagnosis Acute cholecystitisCHOLECYSTECTOMY LAPAROSCOPIC   Does the patient have one of the following disease processes/diagnoses(primary or secondary)? General Surgery   Is there a DME ordered? No   Prep survey completed? Yes            RICHY VAN - Registered Nurse

## 2023-09-10 NOTE — NURSING NOTE
Pt discharged at 0853, alert and ambulatory. Ambulated to personal transportation per patient request

## 2023-09-10 NOTE — PROGRESS NOTES
"Patient Name:  Comfort Main  YOB: 1990  [id number]    Surgery Progress Note    Date of visit: 9/10/2023      Subjective: Doing well.  Feels much better.  Asking to go home.  No nausea or vomiting.  Has tolerated diet.          Objective:     BP (!) 163/106 (BP Location: Left arm, Patient Position: Lying)   Pulse 90   Temp 97.7 °F (36.5 °C) (Oral)   Resp 18   Ht 175.3 cm (69\")   Wt 117 kg (258 lb)   LMP 08/15/2023   SpO2 96%   Breastfeeding No   BMI 38.10 kg/m²     Intake/Output Summary (Last 24 hours) at 9/10/2023 0857  Last data filed at 9/9/2023 1800  Gross per 24 hour   Intake 2292.08 ml   Output --   Net 2292.08 ml       GEN:   Awake, alert, in no acute distress, resting comfortably in bed   CV:   Regular rate and rhythm  L:  Symmetric expansion, not labored on room air  Abd:  Soft, appropriately tender palpation along incisions, incisions are clean dry and intact, nondistended, obese  Ext:  No cyanosis, clubbing, or edema    Recent labs that are back at this time have been reviewed.           Assessment/ Plan:    Mrs. Main is a 32-year-old lady with history of depression, tobacco abuse, and marijuana use with acute cholecystitis.     #Cholecystitis  -Postop day 1 following laparoscopic cholecystectomy  -Labs are without acute findings.  -Tolerating diet.  Feels significantly clinically improved  -Clear for discharge from my standpoint.  Should follow-up me in 2 weeks.  No further need for antibiotics    -Regular low fat diet and activity as tolerated.  -Please remind patient no lifting greater than 10 lbs for 2  weeks.   -Please remind patient to continue with daily stool softener.   -Ok to remove bandages 48 hrs after surgery. Leave steri strips in place. Steri strips will fall off on their own.  -Ok to shower in 24 hrs. Let warm soapy water run over wounds. Pat dry do not scrub. Do not soak in tub bathe for 2 weeks.   -Please instruct patient to call the [facility] " [facility] [phone] if fever greater than 101.5, increased redness or drainage from wound sites, or worsening nausea or vomiting.         Alejandro Christine MD  9/10/2023  08:57 EDT

## 2023-09-10 NOTE — DISCHARGE SUMMARY
[facility] Medicine Services  DISCHARGE SUMMARY    Patient Name: Comfort Main  : 1990  MRN: [id number]    Date of Admission: 2023 10:21 PM  Date of Discharge:  9/10/2023  Primary Care Physician: Mariella Jimenez APRN    Consults       Date and Time Order Name Status Description    2023 12:34 AM Inpatient General Surgery Consult Completed             Hospital Course     Presenting Problem: Cholecystitis    Active Hospital Problems    Diagnosis  POA    **Acute cholecystitis [K81.0]  Yes    Depression [F32.A]  Yes    Marijuana use [F12.90]  Yes    Tobacco use [Z72.0]  Yes    Hypokalemia [E87.6]  Yes      Resolved Hospital Problems   No resolved problems to display.          Hospital Course:  Comfort Main is a 32 y.o. female that presented with acute cholecystitis and underwent CCY on 23. Post-operatively she is tolerating PO. No n/v. No f/c/sweats. She is ready for discharge. She can follow up with surgery as written.       Discharge Follow Up Recommendations for outpatient labs/diagnostics:  As writen    Day of Discharge     HPI:   No f/c/sweats. No n/v. Tolerating PO. No dyspnea. Ready for home    Review of Systems  As above    Vital Signs:   Temp:  [97.7 °F (36.5 °C)-98.7 °F (37.1 °C)] 97.7 °F (36.5 °C)  Heart Rate:  [] 90  Resp:  [14-20] 18  BP: (140-182)/() 163/106  Flow (L/min):  [2] 2      Physical Exam:  NAD, alert and oriented  OP clear, MMM  Neck supple  No LAD  RRR  CTAB  +BS, soft  MILLIGAN  Normal affect  No rashes    Pertinent  and/or Most Recent Results     LAB RESULTS:      Lab 09/10/23  0411 23  0406 23  2058 23  1130 23  1128   WBC 10.95* 9.29 12.41*  --  13.89*   HEMOGLOBIN 11.4* 12.4 14.1  --  13.8   HEMATOCRIT 36.5 39.3 44.8  --  42.5   PLATELETS 306 321 394  --  331   NEUTROS ABS 8.24* 6.25 8.97*  --  10.71*   IMMATURE GRANS (ABS) 0.04 0.03 0.05  --  0.07*   LYMPHS ABS 1.69 1.98 2.23  --   2.08   MONOS ABS 0.92* 0.92* 1.06*  --  0.97*   EOS ABS 0.04 0.08 0.07  --  0.03   MCV 87.3 86.9 87.3  --  85.9   PROCALCITONIN  --   --   --   --  0.02   LACTATE  --   --  1.1 1.5  --          Lab 09/10/23  0411 09/09/23  2352 09/09/23  1224 09/09/23 0406 09/08/23 2058 09/05/23  1128   SODIUM 141  --   --  140 142 140   POTASSIUM 4.1 4.4 3.3* 3.5 3.3* 3.8   CHLORIDE 106  --   --  103 101 104   CO2 29.0  --   --  25.0 25.0 22.2   ANION GAP 6.0  --   --  12.0 16.0* 13.8   BUN 8  --   --  10 11 8   CREATININE 0.57  --   --  0.51* 0.69 0.58   EGFR 125.6  --   --  129.0 119.9 125.0   GLUCOSE 109*  --   --  101* 111* 115*   CALCIUM 8.8  --   --  8.7 9.7 9.7   MAGNESIUM  --   --   --  2.1  --   --          Lab 09/10/23  0411 09/09/23  0406 09/08/23  2058 09/07/23  0027 09/05/23  1128   TOTAL PROTEIN 6.5 6.8 8.1  --  7.6   ALBUMIN 3.5 3.6 4.3  --  4.9   GLOBULIN 3.0 3.2 3.8  --  2.7   ALT (SGPT) 49* 39* 54*  --  36*   AST (SGOT) 33* 14 19  --  12   BILIRUBIN 0.4 0.5 0.4  --  0.4   ALK PHOS 86 90 114  --  107   LIPASE  --   --  35 28 29         Lab 09/05/23  1128   HSTROP T <6             Lab 09/09/23 0406   ABO TYPING O   RH TYPING Positive   ANTIBODY SCREEN Negative         Brief Urine Lab Results  (Last result in the past 365 days)        Color   Clarity   Blood   Leuk Est   Nitrite   Protein   CREAT   Urine HCG        09/08/23 2107               Negative             Microbiology Results (last 10 days)       ** No results found for the last 240 hours. **            CT Abdomen Pelvis Without Contrast    Result Date: 9/5/2023  CT SCAN OF THE ABDOMEN AND PELVIS WITHOUT CONTRAST     9/5/2023 2:34 PM  HISTORY: ruq abd pain Abdominal pain.  PROCEDURE: Axial CT images were obtained from the lung bases to the pubic symphysis without IV contrast. Reformatted images were generated from the axial data set and provided for interpretation. This study was performed with techniques to keep radiation doses as low as reasonably  achievable (ALARA). Individualized dose reduction techniques using automated exposure control or adjustment of mA and/or kV according to the patient size were employed.   COMPARISON: None.  FINDINGS: Lack of IV contrast limits evaluation of the abdominal and pelvic solid organs.  LOWER CHEST: The heart is normal in size. Lung bases are clear.  ABDOMEN/PELVIS: Liver, gallbladder and bile ducts: Unenhanced liver is grossly unremarkable without suspicious focal abnormality. Distended gallbladder. There are few gallstones filling the entire gallbladder lumen. Hyperdense material within the gallbladder lumen. Mild diffuse gallbladder wall thickening. No surrounding inflammation. No definite biliary ductal dilatation.  Adrenal glands: The adrenal glands are morphologically unremarkable without suspicious lesion.  Kidneys, ureters and urinary bladder: No nephrolithiasis. Medullary nephrocalcinosis. No hydronephrosis. Diffuse urinary bladder wall thickening.  Spleen: The spleen is normal in size.  Pancreas: The pancreas is grossly unremarkable.  Gastrointestinal system and mesentery: There is no evidence of bowel obstruction. The appendix is visualized, long, and unremarkable. No significant mesenteric inflammation. Mild diffuse wall thickening of the cecum, ascending colon, transverse colon and proximal descending colon. No surrounding inflammation.  Lymph nodes: No definite pathologically enlarged abdominal or pelvic lymph nodes are present within the limits of a noncontrast enhanced examination.  Vessels: The abdominal aorta is normal in caliber. The inferior vena cava is unremarkable.  Peritoneum: No free intraperitoneal fluid or pneumoperitoneum.  Pelvic viscera: No acute findings. Fibroid uterus with multiple small fibroids, partially calcified fibroids.  Body wall: No acute findings. No significant body wall hernias.  Bones: No acute fracture.       1. Cholelithiasis. Distended gallbladder. Diffuse gallbladder wall  thickening. No surrounding inflammation. Findings are concerning for chronic cholecystitis. Superimposed early acute component cannot be excluded.  2. Medullary nephrocalcinosis. No hydronephrosis. No stones in the ureters or urinary bladder. Diffuse urinary bladder wall thickening, correlate for possible cystitis.  3. Fibroid uterus.  4. Mild diffuse colonic wall thickening without surrounding inflammatory fat stranding. Possible mild colitis. Correlate clinically.    Images personally reviewed, interpreted and dictated by LE Delgado.         This report was signed and finalized on 9/5/2023 2:08 PM by LE Delgado.      US Gallbladder    Result Date: 9/8/2023  US GALLBLADDER Date of Exam: 9/8/2023 9:33 PM EDT Indication: RUQ pain. Comparison: No comparisons available. Technique: Grayscale and color Doppler ultrasound evaluation of the right upper quadrant was performed. Findings: Limited evaluation of the pancreatic head demonstrates no gross abnormality. The abdominal aorta inferior vena cava were not visualized on this exam. There are several hypodense lesions throughout the liver the appearance of which are consistent with hemangiomas. There is hepatopetal flow in the portal vein. There are multiple shadowing calculi within the gallbladder lumen. The gallbladder wall is thickened measuring 0.81 cm. There is small amount of pericholecystic fluid. The technologist reports a positive sonographic Plaza sign. The common bile duct measures 0.56 cm. The right kidney measures 11.1 x 5.7 x 1.7 cm without hydronephrosis or renal calculi.     Cholelithiasis with gallbladder wall thickening and pericholecystic fluid and a positive sonographic Plaza sign consistent with acute cholecystitis. Multiple hemangiomas in the liver. The abdominal aorta and inferior vena cava were not identified on this exam. Electronically Signed: Zander Velazquez MD  9/8/2023 10:14 PM EDT  Workstation ID: [id number]                  Plan for Follow-up of Pending Labs/Results:   Pending Labs       Order Current Status    Tissue Pathology Exam Collected (09/09/23 1001)    Blood Culture - Blood, Arm, Right In process    Blood Culture - Blood, Arm, Right In process          Discharge Details        Discharge Medications        New Medications        Instructions Start Date   oxyCODONE-acetaminophen 5-325 MG per tablet  Commonly known as: PERCOCET   2 tablets, Oral, Every 8 Hours PRN      PHARMACY MEDS TO BED CONSULT   Does not apply, Daily      sennosides-docusate 8.6-50 MG per tablet  Commonly known as: PERICOLACE   2 tablets, Oral, 2 Times Daily PRN             Continue These Medications        Instructions Start Date   ondansetron ODT 4 MG disintegrating tablet  Commonly known as: ZOFRAN-ODT   4 mg, Translingual, Every 8 Hours PRN      sertraline 50 MG tablet  Commonly known as: ZOLOFT   50 mg, Oral, Daily             Stop These Medications      HYDROcodone-acetaminophen 5-325 MG per tablet  Commonly known as: NORCO              No Known Allergies      Discharge Disposition:  Home or Self Care    Diet:  Hospital:  Diet Order   Procedures    Diet: Regular/House Diet; Texture: Regular Texture (IDDSI 7); Fluid Consistency: Thin (IDDSI 0)       Activity:      Restrictions or Other Recommendations:  Per surgical restrictions       CODE STATUS:    Code Status and Medical Interventions:   Ordered at: 09/08/23 7661     Level Of Support Discussed With:    Patient     Code Status (Patient has no pulse and is not breathing):    CPR (Attempt to Resuscitate)     Medical Interventions (Patient has pulse or is breathing):    Full Support       Future Appointments   Date Time Provider Department Center   9/13/2023  2:00 PM Katina Asif MD [facility]       Additional Instructions for the Follow-ups that You Need to Schedule       Discharge Follow-up with PCP   As directed       Currently Documented PCP:    Mariella Jimenez APRN    PCP  Phone Number:    [phone]     Follow Up Details: 1-2 weeks        Discharge Follow-up with Specified Provider: Emmett 2 weeks   As directed      To: Emmett 2 weeks                      Saul Feng MD  09/10/23      Time Spent on Discharge:  I spent  40  minutes on this discharge activity which included: face-to-face encounter with the patient, reviewing the data in the system, coordination of the care with the nursing staff as well as consultants, documentation, and entering orders.

## 2023-09-11 ENCOUNTER — TRANSITIONAL CARE MANAGEMENT TELEPHONE ENCOUNTER (OUTPATIENT)
Dept: CALL CENTER | Facility: HOSPITAL | Age: 33
End: 2023-09-11
Payer: COMMERCIAL

## 2023-09-11 ENCOUNTER — TELEPHONE (OUTPATIENT)
Dept: FAMILY MEDICINE CLINIC | Facility: CLINIC | Age: 33
End: 2023-09-11
Payer: COMMERCIAL

## 2023-09-11 NOTE — TELEPHONE ENCOUNTER
Provider: DR. DUVAL    Caller: LOUIS HAWK      Phone Number: [phone]     Reason for Call: PATIENT CALLED STATED SHE HAD GALLBLADDER SURGERY ON 09/09/20. PATIENT WAS PRESCRIBED 6 TABLETS OF OXYCODONE FOR PAIN. PATIENT TOOK THE LAST OF THE MEDICATION THIS MORNING AND IS STILL IN PAIN. SHE IS WONDERED IF MORE MEDICATION COULD BE CALLED IN FOR HER.

## 2023-09-11 NOTE — OUTREACH NOTE
Call Center TCM Note      Flowsheet Row Responses   [facility] patient discharged from? [place]   Does the patient have one of the following disease processes/diagnoses(primary or secondary)? General Surgery   TCM attempt successful? Yes   Call start time 1422   Call end time 1427   Discharge diagnosis Acute cholecystitisCHOLECYSTECTOMY LAPAROSCOPIC   Is patient permission given to speak with other caregiver? Yes   List who call center can speak with mother--Leonie Lomeli   Person spoke with today (if not patient) and relationship patient   Meds reviewed with patient/caregiver? Yes   Is the patient having any side effects they believe may be caused by any medication additions or changes? No   Does the patient have all medications related to this admission filled (includes all antibiotics, pain medications, etc.) Yes   Is the patient taking all medications as directed (includes completed medication regime)? Yes   Comments Pt states she has a call out to PCP and will make her appt.   Does the patient have an appointment with their PCP within 7-14 days of discharge? No   Nursing Interventions Patient declined scheduling/rescheduling appointment at this time   Has home health visited the patient within 72 hours of discharge? N/A   Psychosocial issues? No   Comments pt states she is doing well. still has some discomfort in abdomen, mild. incisions have steri strips intact. no s/s of infection reported.   Did the patient receive a copy of their discharge instructions? Yes   Nursing interventions Reviewed instructions with patient   What is the patient's perception of their health status since discharge? Improving   Nursing interventions Nurse provided patient education   Is the patient /caregiver able to teach back basic post-op care? Continue use of incentive spirometry at least 1 week post discharge, Take showers only when approved by MD-sponge bathe until then, No tub bath, swimming, or hot tub until  instructed by MD, Keep incision areas clean,dry and protected, Do not remove steri-strips   Is the patient/caregiver able to teach back signs and symptoms of incisional infection? Increased redness, swelling or pain at the incisonal site, Increased drainage or bleeding, Incisional warmth, Pus or odor from incision, Fever   Is the patient/caregiver able to teach back steps to recovery at home? Set small, achievable goals for return to baseline health, Rest and rebuild strength, gradually increase activity, Eat a well-balance diet   Is the patient/caregiver able to teach back the hierarchy of who to call/visit for symptoms/problems? PCP, Specialist, Home health nurse, Urgent Care, ED, 911 Yes   TCM call completed? Yes   Wrap up additional comments pt denies any needs at this time.   Call end time 1427   Would this patient benefit from a Referral to [facility] Social Work? No   Is the patient interested in additional calls from an ambulatory ? No            Katina Ovalles RN    9/11/2023, 14:27 EDT

## 2023-09-12 LAB
CYTO UR: NORMAL
LAB AP CASE REPORT: NORMAL
LAB AP CLINICAL INFORMATION: NORMAL
PATH REPORT.FINAL DX SPEC: NORMAL
PATH REPORT.GROSS SPEC: NORMAL

## 2023-09-14 LAB
BACTERIA SPEC AEROBE CULT: NORMAL
BACTERIA SPEC AEROBE CULT: NORMAL

## 2023-12-04 ENCOUNTER — OFFICE VISIT (OUTPATIENT)
Dept: FAMILY MEDICINE CLINIC | Facility: CLINIC | Age: 33
End: 2023-12-04
Payer: COMMERCIAL

## 2023-12-04 VITALS
WEIGHT: 260 LBS | SYSTOLIC BLOOD PRESSURE: 128 MMHG | OXYGEN SATURATION: 99 % | HEIGHT: 69 IN | TEMPERATURE: 97.9 F | HEART RATE: 104 BPM | BODY MASS INDEX: 38.51 KG/M2 | DIASTOLIC BLOOD PRESSURE: 86 MMHG

## 2023-12-04 DIAGNOSIS — M54.42 CHRONIC LEFT-SIDED LOW BACK PAIN WITH LEFT-SIDED SCIATICA: ICD-10-CM

## 2023-12-04 DIAGNOSIS — G89.29 CHRONIC LEFT-SIDED LOW BACK PAIN WITH LEFT-SIDED SCIATICA: ICD-10-CM

## 2023-12-04 DIAGNOSIS — M54.16 LUMBAR RADICULOPATHY: Primary | ICD-10-CM

## 2023-12-04 PROCEDURE — 1159F MED LIST DOCD IN RCRD: CPT | Performed by: NURSE PRACTITIONER

## 2023-12-04 PROCEDURE — 1160F RVW MEDS BY RX/DR IN RCRD: CPT | Performed by: NURSE PRACTITIONER

## 2023-12-04 PROCEDURE — 99214 OFFICE O/P EST MOD 30 MIN: CPT | Performed by: NURSE PRACTITIONER

## 2023-12-04 RX ORDER — METHOCARBAMOL 750 MG/1
750 TABLET, FILM COATED ORAL 2 TIMES DAILY PRN
Qty: 30 TABLET | Refills: 0 | Status: SHIPPED | OUTPATIENT
Start: 2023-12-04

## 2023-12-04 RX ORDER — GABAPENTIN 100 MG/1
CAPSULE ORAL
Qty: 90 CAPSULE | Refills: 0 | Status: SHIPPED | OUTPATIENT
Start: 2023-12-04

## 2023-12-04 RX ORDER — METHYLPREDNISOLONE 4 MG/1
TABLET ORAL
Qty: 21 TABLET | Refills: 0 | Status: SHIPPED | OUTPATIENT
Start: 2023-12-04

## 2023-12-04 NOTE — PROGRESS NOTES
"      Subjective     Chief Complaint:    Chief Complaint   Patient presents with   • Leg Pain     Pt states pain has been constant for 4 months in her entire left leg       History of Present Illness:   Left glute pain that radiates to her left leg for the past several months, constant, has taken tylenol, motrin without improvement, no PT or xray, has been stretching, hurts in left ankle, wakes up in pain in the middle of the night, nothing helps the pain        Review of Systems  Gen- No fevers, chills  CV- No chest pain, palpitations  Resp- No cough, dyspnea  GI- No N/V/D, abd pain  Neuro-No dizziness, headaches      I have reviewed and/or updated the patient's past medical, surgical, family, social history and problem list as appropriate.     Medications:    Current Outpatient Medications:   •  methylPREDNISolone (MEDROL) 4 MG dose pack, Take as directed on package instructions., Disp: 21 tablet, Rfl: 0    Allergies:  No Known Allergies    Objective     Vital Signs:   Vitals:    12/04/23 1419   BP: 128/86   Pulse: 104   Temp: 97.9 °F (36.6 °C)   SpO2: 99%   Weight: 118 kg (260 lb)   Height: 175.3 cm (69.02\")     Body mass index is 38.38 kg/m².    Physical Exam:    Physical Exam  Vitals and nursing note reviewed.   Constitutional:       Appearance: She is well-developed.   HENT:      Head: Normocephalic and atraumatic.   Eyes:      Pupils: Pupils are equal, round, and reactive to light.   Cardiovascular:      Rate and Rhythm: Normal rate and regular rhythm.      Heart sounds: Normal heart sounds.   Pulmonary:      Effort: Pulmonary effort is normal.      Breath sounds: Normal breath sounds.   Abdominal:      General: Bowel sounds are normal. There is no distension.      Palpations: Abdomen is soft.      Tenderness: There is no abdominal tenderness.   Musculoskeletal:      Cervical back: Neck supple.      Thoracic back: No spasms or tenderness.      Lumbar back: No spasms or tenderness. Positive left straight leg " raise test. Negative right straight leg raise test.   Skin:     General: Skin is warm and dry.   Neurological:      General: No focal deficit present.      Mental Status: She is alert and oriented to person, place, and time.   Psychiatric:         Mood and Affect: Mood normal.         Behavior: Behavior normal.       Assessment / Plan     Assessment/Plan:   Problem List Items Addressed This Visit    None  Visit Diagnoses       Lumbar radiculopathy    -  Primary    Relevant Medications    methylPREDNISolone (MEDROL) 4 MG dose pack    Other Relevant Orders    XR Spine Lumbar Complete With Flex & Ext    Ambulatory Referral to Physical Therapy Evaluate and treat    Chronic left-sided low back pain with left-sided sciatica        Relevant Medications    methylPREDNISolone (MEDROL) 4 MG dose pack    Other Relevant Orders    XR Spine Lumbar Complete With Flex & Ext    Ambulatory Referral to Physical Therapy Evaluate and treat            -- xray, PT, really needs MRI due to radiculopathy nature of symptoms  -- steroid     Discussed plan of care in detail with pt today; pt verb understanding and agrees.    Follow up:  4 weeks    Electronically signed by CASSIUS Salgado   12/04/2023 14:46 EST      Please note that portions of this note were completed with a voice recognition program.

## 2024-01-19 DIAGNOSIS — G89.29 CHRONIC LEFT-SIDED LOW BACK PAIN WITH LEFT-SIDED SCIATICA: ICD-10-CM

## 2024-01-19 DIAGNOSIS — M54.16 LUMBAR RADICULOPATHY: Primary | ICD-10-CM

## 2024-01-19 DIAGNOSIS — M54.42 CHRONIC LEFT-SIDED LOW BACK PAIN WITH LEFT-SIDED SCIATICA: ICD-10-CM

## 2024-02-14 ENCOUNTER — OFFICE VISIT (OUTPATIENT)
Dept: FAMILY MEDICINE CLINIC | Facility: CLINIC | Age: 34
End: 2024-02-14
Payer: COMMERCIAL

## 2024-02-14 VITALS
WEIGHT: 265 LBS | HEIGHT: 69 IN | BODY MASS INDEX: 39.25 KG/M2 | OXYGEN SATURATION: 98 % | SYSTOLIC BLOOD PRESSURE: 90 MMHG | DIASTOLIC BLOOD PRESSURE: 55 MMHG | HEART RATE: 114 BPM

## 2024-02-14 DIAGNOSIS — R53.83 OTHER FATIGUE: ICD-10-CM

## 2024-02-14 DIAGNOSIS — Z34.90 PREGNANCY, UNSPECIFIED GESTATIONAL AGE: Primary | ICD-10-CM

## 2024-02-14 DIAGNOSIS — Z86.39 HISTORY OF IRON DEFICIENCY: ICD-10-CM

## 2024-02-16 DIAGNOSIS — Z34.90 PREGNANCY, UNSPECIFIED GESTATIONAL AGE: Primary | ICD-10-CM

## 2024-02-16 LAB
B-HCG UR QL: POSITIVE
EXPIRATION DATE: ABNORMAL
INTERNAL NEGATIVE CONTROL: ABNORMAL
INTERNAL POSITIVE CONTROL: ABNORMAL
Lab: ABNORMAL

## 2024-03-04 ENCOUNTER — INITIAL PRENATAL (OUTPATIENT)
Dept: OBSTETRICS AND GYNECOLOGY | Facility: CLINIC | Age: 34
End: 2024-03-04
Payer: COMMERCIAL

## 2024-03-04 VITALS — SYSTOLIC BLOOD PRESSURE: 122 MMHG | WEIGHT: 271 LBS | BODY MASS INDEX: 40.02 KG/M2 | DIASTOLIC BLOOD PRESSURE: 74 MMHG

## 2024-03-04 DIAGNOSIS — O36.80X0 ENCOUNTER TO DETERMINE FETAL VIABILITY OF PREGNANCY, SINGLE OR UNSPECIFIED FETUS: ICD-10-CM

## 2024-03-04 DIAGNOSIS — Z98.891 HX OF CESAREAN SECTION: ICD-10-CM

## 2024-03-04 DIAGNOSIS — Z34.82 ENCOUNTER FOR SUPERVISION OF OTHER NORMAL PREGNANCY IN SECOND TRIMESTER: Primary | ICD-10-CM

## 2024-03-04 PROBLEM — Z34.90 PREGNANCY: Status: RESOLVED | Noted: 2021-12-30 | Resolved: 2024-03-04

## 2024-03-04 PROBLEM — Z34.93 THIRD TRIMESTER PREGNANCY: Status: RESOLVED | Noted: 2021-12-30 | Resolved: 2024-03-04

## 2024-03-04 PROCEDURE — 99213 OFFICE O/P EST LOW 20 MIN: CPT | Performed by: MIDWIFE

## 2024-03-04 RX ORDER — PNV NO.95/FERROUS FUM/FOLIC AC 28MG-0.8MG
TABLET ORAL
COMMUNITY

## 2024-03-04 NOTE — PROGRESS NOTES
Subjective     Chief Complaint   Patient presents with    Initial Prenatal Visit     New OB, 14w 1d, MERCEDES 24       Comfort Main is a 33 y.o. .  Patient's last menstrual period was 08/15/2023..  She presents to be seen to initiate prenatal care with our practice. She has had uncomplicated pregnancies x 3.G1 , G2 CSection twins-malposition, G3 repeat CSection. She has had a lot of nausea and has been using THC. She has also had dizziness and fatigue, she feels her iron is low.    The following portions of the patient's history were reviewed and updated as appropriate:vital signs, allergies, current medications, past family history, past medical history, past social history, past surgical history, and problem list.    Review of Systems -   /74   Wt 123 kg (271 lb)   LMP 08/15/2023   BMI 40.02 kg/m²   Gastrointestinal: Nausea, denies constipation   Genitourinary: denies frequency, urgency, or burning with urination  All other systems were reviewed and are negative    Objective     Physical Exam  Constitutional   The patient is awake, alert, well developed, well nourished and well groomed.   EENT  The neck is supple and the trachea is midline. Thyroid without nodules  Respiratory  The patient is relaxed and breathes without effort.   Lungs CTAB  Cardiovascular  Heart normal rate and rhythm is regular without murmur    Gastrointestinal   The abdomen is soft and non tender.  No hepatosplenomegaly.  Skin  Normal color. No rashes or lesions  Musculoskeletal  Full ROM. Normal gait  Neuro  Speech is fluent and words are clear. Normal, appropriate behavior   Psychiatric :  Oriented to person, place, and time. Thought processes are coherent, insight is good.     Imaging   Pelvic ultrasound report  14w1d, + FHT    Assessment & Plan     ASSESSMENT  IUP at 14w1d   Hx of CSection x 2  3.   Discomforts of pregnancy.    PLAN  Tests ordered today:  Orders Placed This Encounter   Procedures     Chlamydia trachomatis, Neisseria gonorrhoeae, PCR w/ confirmation - , Urine, Clean Catch     Order Specific Question:   Release to patient     Answer:   Routine Release [[id number]]    US Ob Limited 1 + Fetuses     Order Specific Question:   Reason for Exam:     Answer:   NOB, dates, viability     Order Specific Question:   Release to patient     Answer:   Routine Release [[id number]]    [id number] PLUS Core+SCA - Blood,     Order Specific Question:   [facility] Date of last menstrual period or estimated date of delivery (corresponding to calculation method):     Answer:   9/1/2024     Order Specific Question:   [facility] Gestational age calculation method:     Answer:   MERCEDES,EDC     Order Specific Question:   Release to patient     Answer:   Routine Release [[id number]]    Urine Drug Screen - Urine, Clean Catch     Order Specific Question:   Release to patient     Answer:   Routine Release [[id number]]    OB Panel With HIV     Order Specific Question:   Release to patient     Answer:   Routine Release [[id number]]     Medications prescribed today:  No orders of the defined types were placed in this encounter.    Information reviewed: smoking THC, exercise in pregnancy, nutrition in pregnancy, weight gain in pregnancy, work and travel restrictions during pregnancy, list of OTC medications acceptable in pregnancy, and call coverage groups  The problem list for pregnancy was initiated today      Follow up: 4 week(s)         This note was electronically signed.    Suzy Lebron CNM  3/4/2024

## 2024-03-10 LAB
ABO GROUP BLD: ABNORMAL
AMPHETAMINES UR QL SCN: NEGATIVE NG/ML
BARBITURATES UR QL SCN: NEGATIVE NG/ML
BASOPHILS # BLD AUTO: 0 X10E3/UL (ref 0–0.2)
BASOPHILS NFR BLD AUTO: 0 %
BENZODIAZ UR QL SCN: NEGATIVE NG/ML
BLD GP AB SCN SERPL QL: NEGATIVE
BZE UR QL SCN: NEGATIVE NG/ML
C TRACH RRNA SPEC QL NAA+PROBE: NEGATIVE
CANNABINOIDS UR QL SCN: POSITIVE NG/ML
CFDNA.FET/CFDNA.TOTAL SFR FETUS: NORMAL %
CITATION REF LAB TEST: NORMAL
CREAT UR-MCNC: 144 MG/DL (ref 20–300)
EOSINOPHIL # BLD AUTO: 0.1 X10E3/UL (ref 0–0.4)
EOSINOPHIL NFR BLD AUTO: 1 %
ERYTHROCYTE [DISTWIDTH] IN BLOOD BY AUTOMATED COUNT: 13.9 % (ref 11.7–15.4)
FET 13+18+21+X+Y ANEUP PLAS.CFDNA: NEGATIVE
FET CHR 21 TS PLAS.CFDNA QL: NEGATIVE
FET MS X RISK WBC.DNA+CFDNA QL: NOT DETECTED
FET SEX PLAS.CFDNA DOSAGE CFDNA: NORMAL
FET TS 13 RISK PLAS.CFDNA QL: NEGATIVE
FET TS 18 RISK WBC.DNA+CFDNA QL: NEGATIVE
FET X + Y ANEUP RISK PLAS.CFDNA SEQ-IMP: NOT DETECTED
GA EST FROM CONCEPTION DATE: NORMAL D
GESTATIONAL AGE > 9:: YES
HBV SURFACE AG SERPL QL IA: NEGATIVE
HCT VFR BLD AUTO: 36.5 % (ref 34–46.6)
HCV IGG SERPL QL IA: NON REACTIVE
HGB BLD-MCNC: 11.7 G/DL (ref 11.1–15.9)
HIV 1+2 AB+HIV1 P24 AG SERPL QL IA: NON REACTIVE
IMM GRANULOCYTES # BLD AUTO: 0.1 X10E3/UL (ref 0–0.1)
IMM GRANULOCYTES NFR BLD AUTO: 1 %
LAB DIRECTOR NAME PROVIDER: NORMAL
LAB DIRECTOR NAME PROVIDER: NORMAL
LABORATORY COMMENT REPORT: ABNORMAL
LABORATORY COMMENT REPORT: NORMAL
LIMITATIONS OF THE TEST: NORMAL
LYMPHOCYTES # BLD AUTO: 2 X10E3/UL (ref 0.7–3.1)
LYMPHOCYTES NFR BLD AUTO: 20 %
MCH RBC QN AUTO: 27.5 PG (ref 26.6–33)
MCHC RBC AUTO-ENTMCNC: 32.1 G/DL (ref 31.5–35.7)
MCV RBC AUTO: 86 FL (ref 79–97)
METHADONE UR QL SCN: NEGATIVE NG/ML
MONOCYTES # BLD AUTO: 0.5 X10E3/UL (ref 0.1–0.9)
MONOCYTES NFR BLD AUTO: 5 %
N GONORRHOEA RRNA SPEC QL NAA+PROBE: NEGATIVE
NEGATIVE PREDICTIVE VALUE: NORMAL
NEUTROPHILS # BLD AUTO: 7.2 X10E3/UL (ref 1.4–7)
NEUTROPHILS NFR BLD AUTO: 73 %
NOTE: NORMAL
OPIATES UR QL SCN: NEGATIVE NG/ML
OXYCODONE+OXYMORPHONE UR QL SCN: NEGATIVE NG/ML
PCP UR QL: NEGATIVE NG/ML
PERFORMANCE CHARACTERISTICS: NORMAL
PH UR: 6.8 [PH] (ref 4.5–8.9)
PLATELET # BLD AUTO: 333 X10E3/UL (ref 150–450)
POSITIVE PREDICTIVE VALUE: NORMAL
PROPOXYPH UR QL SCN: NEGATIVE NG/ML
RBC # BLD AUTO: 4.26 X10E6/UL (ref 3.77–5.28)
REF LAB TEST METHOD: NORMAL
RH BLD: POSITIVE
RPR SER QL: NON REACTIVE
RUBV IGG SERPL IA-ACNC: 3.09 INDEX
TEST PERFORMANCE INFO SPEC: NORMAL
WBC # BLD AUTO: 9.9 X10E3/UL (ref 3.4–10.8)

## 2024-04-01 ENCOUNTER — ROUTINE PRENATAL (OUTPATIENT)
Dept: OBSTETRICS AND GYNECOLOGY | Facility: CLINIC | Age: 34
End: 2024-04-01
Payer: COMMERCIAL

## 2024-04-01 VITALS — DIASTOLIC BLOOD PRESSURE: 72 MMHG | SYSTOLIC BLOOD PRESSURE: 122 MMHG

## 2024-04-01 DIAGNOSIS — Z36.89 ENCOUNTER FOR FETAL ANATOMIC SURVEY: Primary | ICD-10-CM

## 2024-04-01 DIAGNOSIS — O22.42 HEMORRHOIDS DURING PREGNANCY IN SECOND TRIMESTER: ICD-10-CM

## 2024-04-01 DIAGNOSIS — Z98.891 PREVIOUS CESAREAN SECTION: ICD-10-CM

## 2024-04-01 PROCEDURE — 99213 OFFICE O/P EST LOW 20 MIN: CPT | Performed by: STUDENT IN AN ORGANIZED HEALTH CARE EDUCATION/TRAINING PROGRAM

## 2024-04-01 RX ORDER — DOCUSATE SODIUM 100 MG/1
100 CAPSULE, LIQUID FILLED ORAL 2 TIMES DAILY
Qty: 60 CAPSULE | Refills: 1 | Status: SHIPPED | OUTPATIENT
Start: 2024-04-01

## 2024-04-01 NOTE — PROGRESS NOTES
Prenatal Care Visit    Subjective   Chief Complaint   Patient presents with    Routine Prenatal Visit     Anatomy scan. Patient complains of having blood in her stool, felling dizzy and weak     History:   Comfort is a  currently at 18w1d who presents for a prenatal care visit today.    Denies abdominal pain/ CTX, VB, LOF. Reports (+) flutters. Has had rectal bleeding with stools - she reports a history of hemorrhoids. Has not had significant constipation. Reports weakness, dizziness.      Objective   /72   LMP 08/15/2023   Physical Exam:  Normal, gestational age-appropriate exam today      Assessment & Plan     IUP @ 18w1d  Routine care: I have reviewed the prenatal labs and ultrasound(s) today. I have reviewed the most recent prenatal progress note(s). Anatomy sono today - all anatomy seen and cleared as normal. EFW 54%, AC 57%, vertex, anterior placenta.  Prior x 2: G2 due to malpresentation w/ twins, G3 scheduled repeat. Plan repeat CS. Does not plan BTL.  Hemorrhoids: advised tucks pads, stool softener. Check CBC given signs/symptoms of anemia - reported at initial OB visit as well with normal H/H.     Diagnosis Plan   1. Encounter for fetal anatomic survey  US Ob 14 + Weeks Single or First Gestation      2. Previous  section        3. Hemorrhoids during pregnancy in second trimester  CBC (No Diff)    docusate sodium (Colace) 100 MG capsule        Medication Management: continue  PNV. Start colace.    Topics discussed: Prenatal care milestones  Kick counts and fetal movement   labor signs and symptoms   Tests next visit: none   Next visit: 4 week(s)     Kenyetta Fitzgerald MD  Obstetrics and Gynecology  [facility]

## 2024-06-03 ENCOUNTER — ROUTINE PRENATAL (OUTPATIENT)
Dept: OBSTETRICS AND GYNECOLOGY | Facility: CLINIC | Age: 34
End: 2024-06-03
Payer: COMMERCIAL

## 2024-06-03 VITALS — WEIGHT: 280.6 LBS | BODY MASS INDEX: 41.44 KG/M2 | SYSTOLIC BLOOD PRESSURE: 130 MMHG | DIASTOLIC BLOOD PRESSURE: 84 MMHG

## 2024-06-03 DIAGNOSIS — Z34.92 SECOND TRIMESTER PREGNANCY: Primary | ICD-10-CM

## 2024-06-03 PROCEDURE — 99213 OFFICE O/P EST LOW 20 MIN: CPT | Performed by: OBSTETRICS & GYNECOLOGY

## 2024-06-03 NOTE — PROGRESS NOTES
Chief Complaint   Patient presents with    Routine Prenatal Visit     Prenatal visit with no problems or concerns        HPI:   , 27w1d gestation reports doing well    ROS:  See Prenatal Episode/Flowsheet  /84   Wt 127 kg (280 lb 9.6 oz)   LMP 08/15/2023   BMI 41.44 kg/m²      EXAM:  EXTREMITIES:  No swelling-See Prenatal Episode/Flowsheet    ABDOMEN:  FHTs/Movement noted-See Prenatal Episode/Flowsheet    URINE GLUCOSE/PROTEIN:  See Prenatal Episode/Flowsheet    PELVIC EXAM:  See Prenatal Episode/Flowsheet  CV:  Lungs:  GYN:    MDM:    Lab Results   Component Value Date    HGB 11.7 2024    RUBELLAIGGIN Immune 2015    RUBELLAABIGG 3.09 2024    HEPBSAG Negative 2024    LABRPR Non-reactive 2015    ABORH O Rh Positive 10/18/2015    ABO O 2024    RH Positive 2024    ABSCRN Negative 2024    LABANTI Negative 2015    [id number] NonReactive 2015    KVP9WVS2 Non Reactive 2024    HEPCVIRUSABY Non Reactive 2024    STREPGPB Negative 01/10/2022    URINECX No growth 2019       U/S:US Ob 14 + Weeks Single or First Gestation (2024 15:57)     1. IUP 27w1d  2. Routine care   3. Prior C/Sx 2  4. Needs gluocla

## 2024-06-28 ENCOUNTER — ROUTINE PRENATAL (OUTPATIENT)
Dept: OBSTETRICS AND GYNECOLOGY | Facility: CLINIC | Age: 34
End: 2024-06-28
Payer: COMMERCIAL

## 2024-06-28 VITALS — DIASTOLIC BLOOD PRESSURE: 78 MMHG | SYSTOLIC BLOOD PRESSURE: 134 MMHG | WEIGHT: 282 LBS | BODY MASS INDEX: 41.64 KG/M2

## 2024-06-28 DIAGNOSIS — Z34.83 ENCOUNTER FOR SUPERVISION OF OTHER NORMAL PREGNANCY, THIRD TRIMESTER: Primary | ICD-10-CM

## 2024-06-28 DIAGNOSIS — O22.43 HEMORRHOIDS DURING PREGNANCY IN THIRD TRIMESTER: ICD-10-CM

## 2024-06-28 DIAGNOSIS — K59.00 CONSTIPATION, UNSPECIFIED CONSTIPATION TYPE: ICD-10-CM

## 2024-06-28 DIAGNOSIS — Z98.891 PREVIOUS CESAREAN SECTION: ICD-10-CM

## 2024-06-28 DIAGNOSIS — K62.5 RECTAL BLEEDING: ICD-10-CM

## 2024-06-28 LAB
BASOPHILS # BLD AUTO: 0.02 10*3/MM3 (ref 0–0.2)
BASOPHILS NFR BLD AUTO: 0.2 % (ref 0–1.5)
EOSINOPHIL # BLD AUTO: 0.12 10*3/MM3 (ref 0–0.4)
EOSINOPHIL NFR BLD AUTO: 1.4 % (ref 0.3–6.2)
ERYTHROCYTE [DISTWIDTH] IN BLOOD BY AUTOMATED COUNT: 12.7 % (ref 12.3–15.4)
GLUCOSE 1H P 50 G GLC PO SERPL-MCNC: 90 MG/DL (ref 65–139)
HCT VFR BLD AUTO: 34.1 % (ref 34–46.6)
HGB BLD-MCNC: 11 G/DL (ref 12–15.9)
IMM GRANULOCYTES # BLD AUTO: 0.03 10*3/MM3 (ref 0–0.05)
IMM GRANULOCYTES NFR BLD AUTO: 0.4 % (ref 0–0.5)
LYMPHOCYTES # BLD AUTO: 1.49 10*3/MM3 (ref 0.7–3.1)
LYMPHOCYTES NFR BLD AUTO: 18 % (ref 19.6–45.3)
MCH RBC QN AUTO: 28.6 PG (ref 26.6–33)
MCHC RBC AUTO-ENTMCNC: 32.3 G/DL (ref 31.5–35.7)
MCV RBC AUTO: 88.8 FL (ref 79–97)
MONOCYTES # BLD AUTO: 0.49 10*3/MM3 (ref 0.1–0.9)
MONOCYTES NFR BLD AUTO: 5.9 % (ref 5–12)
NEUTROPHILS # BLD AUTO: 6.15 10*3/MM3 (ref 1.7–7)
NEUTROPHILS NFR BLD AUTO: 74.1 % (ref 42.7–76)
NRBC BLD AUTO-RTO: 0 /100 WBC (ref 0–0.2)
PLATELET # BLD AUTO: 324 10*3/MM3 (ref 140–450)
RBC # BLD AUTO: 3.84 10*6/MM3 (ref 3.77–5.28)
WBC # BLD AUTO: 8.3 10*3/MM3 (ref 3.4–10.8)

## 2024-06-28 RX ORDER — HYDROCORTISONE 25 MG/G
CREAM TOPICAL
Qty: 30 G | Refills: 3 | Status: SHIPPED | OUTPATIENT
Start: 2024-06-28

## 2024-06-28 RX ORDER — DOCUSATE SODIUM 100 MG/1
100 CAPSULE, LIQUID FILLED ORAL 2 TIMES DAILY
Qty: 60 CAPSULE | Refills: 6 | Status: SHIPPED | OUTPATIENT
Start: 2024-06-28

## 2024-06-28 NOTE — PROGRESS NOTES
Chief Complaint  Routine Prenatal Visit (Glucola done today, no complaints. )    History of Present Illness:  Comfort is a  currently at 30w5d who presents today with no significant complaints other than continued issues with her hemorrhoids.  Patient has been having active bleeding as well.  She does report constipation.  She has only been using her stool softeners once daily.  She does report good fetal movement.  She denies any significant cramping or contractions.    Exam:  Vitals:  See prenatal flowsheet as noted and reviewed  General: Alert, cooperative, and does not appear in any distress  Abdomen:   See prenatal flowsheet as noted and reviewed    Uterus gravid, non-tender; no palpable masses    No guarding or rebound tenderness  Pelvic:  See prenatal flowsheet as noted and reviewed  Ext:  See prenatal flowsheet as noted and reviewed    Moves extremities well, no cyanosis and no redness  Urine:  See prenatal flowsheet as noted and reviewed    Data Review:  The following data was reviewed by: Tatyana Cornejo MD on 2024:  Prenatal Labs:  Lab Results   Component Value Date    HGB 11.7 2024    RUBELLAIGGIN Immune 2015    RUBELLAABIGG 3.09 2024    HEPBSAG Negative 2024    LABRPR Non-reactive 2015    ABORH O Rh Positive 10/18/2015    ABO O 2024    RH Positive 2024    ABSCRN Negative 2024    LABANTI Negative 2015    [id number] NonReactive 2015    SHV3BBM1 Non Reactive 2024    HEPCVIRUSABY Non Reactive 2024    STREPGPB Negative 01/10/2022    URINECX No growth 2019       No visits with results within 1 Month(s) from this visit.   Latest known visit with results is:   Initial Prenatal on 2024   Component Date Value    Gestation 2024 Shafer     Fetal Fraction 2024 6%     Gestational Age >9: 2024 Yes     Result 2024 Negative      Comments 2024 Comment     Approved By 2024  Comment     TRISOMY 21 (DOWN SYNDROM* 03/04/2024 Negative     TRISOMY 18 (GROVER SYND* 03/04/2024 Negative     TRISOMY 13 (PATAU SYNDRO* 03/04/2024 Negative     FETAL SEX 03/04/2024 Comment     MONOSOMY X (SOLIZ SYNDR* 03/04/2024 Not Detected     XYY (PEREA SYNDROME) 03/04/2024 Not Detected     XXY (KLINEFELTER SYNDROM* 03/04/2024 Not Detected     XXX (TRIPLE X SYNDROME) 03/04/2024 Not Detected     NEGATIVE PREDICTIVE VALUE 03/04/2024 Note     POSITIVE PREDICTIVE VALUE 03/04/2024 N/A     About The Test 03/04/2024 Comment     Test Method 03/04/2024 Comment     Performance 03/04/2024 Comment     PERFORMANCE CHARACTERIST* 03/04/2024 Note     Limitations of the Test 03/04/2024 Comment     Note 03/04/2024 Comment     References 03/04/2024 Comment     Chlamydia trachomatis, N* 03/04/2024 Negative     Neisseria gonorrhoeae, N* 03/04/2024 Negative     Amphetamine, Urine Qual 03/04/2024 Negative     Barbiturates Screen, Uri* 03/04/2024 Negative     Benzodiazepine Screen, U* 03/04/2024 Negative     THC Screen, Urine 03/04/2024 Positive (A)     Cocaine Screen, Urine 03/04/2024 Negative     Opiate Screen, Urine 03/04/2024 Negative     Oxycodone/Oxymorphone, U* 03/04/2024 Negative     Phencyclidine (PCP), Uri* 03/04/2024 Negative     Methadone Screen, Urine 03/04/2024 Negative     Propoxyphene Screen 03/04/2024 Negative     Creatinine, Urine 03/04/2024 144.0     pH, UA 03/04/2024 6.8     Please note 03/04/2024 Comment     Hepatitis B Surface Ag 03/04/2024 Negative     Hep C Virus Ab 03/04/2024 Non Reactive     RPR 03/04/2024 Non Reactive     Rubella Antibodies, IgG 03/04/2024 3.09     ABO Type 03/04/2024 O     Rh Factor 03/04/2024 Positive     Antibody Screen 03/04/2024 Negative     HIV Screen 4th Gen w/RFX* 03/04/2024 Non Reactive     WBC 03/04/2024 9.9     RBC 03/04/2024 4.26     Hemoglobin 03/04/2024 11.7     Hematocrit 03/04/2024 36.5     MCV 03/04/2024 86     MCH 03/04/2024 27.5     MCHC 03/04/2024 32.1     RDW  2024 13.9     Platelets 2024 333     Neutrophil Rel % 2024 73     Lymphocyte Rel % 2024 20     Monocyte Rel % 2024 5     Eosinophil Rel % 2024 1     Basophil Rel % 2024 0     Neutrophils Absolute 2024 7.2 (H)     Lymphocytes Absolute 2024 2.0     Monocytes Absolute 2024 0.5     Eosinophils Absolute 2024 0.1     Basophils Absolute 2024 0.0     Immature Granulocyte Rel* 2024 1     Immature Grans Absolute 2024 0.1      Imaging:  US Ob 14 + Weeks Single or First Gestation  Impression:   IUP at 18w1d. Anatomy was completely cleared today and all organ systems   were found to be normal in appearance. EFW 233g (54%) AC 57%. Cephalic   presentation. Placenta is anterior. Amniotic fluid and fetal heart rate   are normal.    Kenyetta Fitzgerald MD   Obstetrics and Gynecology  [facility]       Medical Records:  None    Assessment and Plan:  Problem List Items Addressed This Visit    None  Visit Diagnoses       Encounter for supervision of other normal pregnancy, third trimester    -  Primary  Topics discussed:     kick counts and fetal movement  PIH precautions   labor signs and symptoms  Labs today as noted    Relevant Orders    Gestational Screen 1 Hr ([facility])    CBC & Differential    US Ob Follow Up Transabdominal Approach    Rectal bleeding      Rx  given as noted.  Patient is to call if no improvement and/or changes in her symptoms.    Relevant Medications    Hydrocortisone, Perianal, (Anusol-HC) 2.5 % rectal cream    docusate sodium (Colace) 100 MG capsule    Hemorrhoids during pregnancy in third trimester        Relevant Medications    Hydrocortisone, Perianal, (Anusol-HC) 2.5 % rectal cream    docusate sodium (Colace) 100 MG capsule    Previous  section        Constipation, unspecified constipation type      Patient is instructed to take stool softeners twice daily.    Relevant Medications    Hydrocortisone,  Perianal, (Anusol-HC) 2.5 % rectal cream    docusate sodium (Colace) 100 MG capsule          Follow Up/Instructions:  Follow up as scheduled.  Patient was given instructions and counseling regarding her condition or for health maintenance advice. Please see specific information pulled into the AVS if appropriate.     Note: Speech recognition transcription software may have been used to dictate portions of this document.  An attempt at proofreading has been made though minor errors in transcription may still be present.    This note was electronically signed.  Tatyana Cornejo M.D.

## 2024-07-08 RX ORDER — FERROUS SULFATE 325(65) MG
325 TABLET, DELAYED RELEASE (ENTERIC COATED) ORAL
Qty: 30 TABLET | Refills: 2 | Status: SHIPPED | OUTPATIENT
Start: 2024-07-08 | End: 2024-10-06

## 2024-07-15 ENCOUNTER — REFERRAL TRIAGE (OUTPATIENT)
Dept: LABOR AND DELIVERY | Facility: HOSPITAL | Age: 34
End: 2024-07-15
Payer: COMMERCIAL

## 2024-07-15 ENCOUNTER — PREP FOR SURGERY (OUTPATIENT)
Dept: OTHER | Facility: HOSPITAL | Age: 34
End: 2024-07-15
Payer: COMMERCIAL

## 2024-07-15 ENCOUNTER — ROUTINE PRENATAL (OUTPATIENT)
Dept: OBSTETRICS AND GYNECOLOGY | Facility: CLINIC | Age: 34
End: 2024-07-15
Payer: COMMERCIAL

## 2024-07-15 VITALS — SYSTOLIC BLOOD PRESSURE: 120 MMHG | DIASTOLIC BLOOD PRESSURE: 78 MMHG | BODY MASS INDEX: 42.38 KG/M2 | WEIGHT: 287 LBS

## 2024-07-15 DIAGNOSIS — Z34.93 PRENATAL CARE, THIRD TRIMESTER: Primary | ICD-10-CM

## 2024-07-15 DIAGNOSIS — Z98.891 PREVIOUS CESAREAN SECTION: ICD-10-CM

## 2024-07-15 DIAGNOSIS — Z98.891 PREVIOUS CESAREAN SECTION: Primary | ICD-10-CM

## 2024-07-15 PROBLEM — Z34.90 PREGNANCY: Status: ACTIVE | Noted: 2024-07-15

## 2024-07-15 PROCEDURE — 99213 OFFICE O/P EST LOW 20 MIN: CPT | Performed by: STUDENT IN AN ORGANIZED HEALTH CARE EDUCATION/TRAINING PROGRAM

## 2024-07-15 RX ORDER — FAMOTIDINE 10 MG/ML
20 INJECTION, SOLUTION INTRAVENOUS ONCE AS NEEDED
OUTPATIENT
Start: 2024-07-15

## 2024-07-15 RX ORDER — SODIUM CHLORIDE 9 MG/ML
40 INJECTION, SOLUTION INTRAVENOUS AS NEEDED
OUTPATIENT
Start: 2024-07-15

## 2024-07-15 RX ORDER — ONDANSETRON 4 MG/1
4 TABLET, ORALLY DISINTEGRATING ORAL EVERY 6 HOURS PRN
OUTPATIENT
Start: 2024-07-15

## 2024-07-15 RX ORDER — DIPHENHYDRAMINE HCL 25 MG
25 CAPSULE ORAL NIGHTLY PRN
OUTPATIENT
Start: 2024-07-15

## 2024-07-15 RX ORDER — PROMETHAZINE HYDROCHLORIDE 12.5 MG/1
12.5 TABLET ORAL EVERY 6 HOURS PRN
OUTPATIENT
Start: 2024-07-15

## 2024-07-15 RX ORDER — OXYTOCIN/0.9 % SODIUM CHLORIDE 30/500 ML
333 PLASTIC BAG, INJECTION (ML) INTRAVENOUS ONCE
OUTPATIENT
Start: 2024-07-15

## 2024-07-15 RX ORDER — MORPHINE SULFATE 2 MG/ML
2 INJECTION, SOLUTION INTRAMUSCULAR; INTRAVENOUS
OUTPATIENT
Start: 2024-07-15

## 2024-07-15 RX ORDER — SODIUM CHLORIDE 0.9 % (FLUSH) 0.9 %
10 SYRINGE (ML) INJECTION EVERY 12 HOURS SCHEDULED
OUTPATIENT
Start: 2024-07-15

## 2024-07-15 RX ORDER — CARBOPROST TROMETHAMINE 250 UG/ML
250 INJECTION, SOLUTION INTRAMUSCULAR AS NEEDED
OUTPATIENT
Start: 2024-07-15

## 2024-07-15 RX ORDER — TRANEXAMIC ACID 10 MG/ML
1000 INJECTION, SOLUTION INTRAVENOUS ONCE AS NEEDED
OUTPATIENT
Start: 2024-07-15

## 2024-07-15 RX ORDER — ACETAMINOPHEN 325 MG/1
650 TABLET ORAL EVERY 6 HOURS
OUTPATIENT
Start: 2024-07-15

## 2024-07-15 RX ORDER — SODIUM CHLORIDE 0.9 % (FLUSH) 0.9 %
10 SYRINGE (ML) INJECTION AS NEEDED
OUTPATIENT
Start: 2024-07-15

## 2024-07-15 RX ORDER — IBUPROFEN 600 MG/1
600 TABLET ORAL EVERY 6 HOURS SCHEDULED
OUTPATIENT
Start: 2024-07-15

## 2024-07-15 RX ORDER — KETOROLAC TROMETHAMINE 30 MG/ML
30 INJECTION, SOLUTION INTRAMUSCULAR; INTRAVENOUS ONCE
OUTPATIENT
Start: 2024-07-15 | End: 2024-07-15

## 2024-07-15 RX ORDER — OXYTOCIN/0.9 % SODIUM CHLORIDE 30/500 ML
42 PLASTIC BAG, INJECTION (ML) INTRAVENOUS AS NEEDED
OUTPATIENT
Start: 2024-07-15 | End: 2024-07-16

## 2024-07-15 RX ORDER — MISOPROSTOL 200 UG/1
800 TABLET ORAL AS NEEDED
OUTPATIENT
Start: 2024-07-15

## 2024-07-15 RX ORDER — LIDOCAINE HYDROCHLORIDE 10 MG/ML
0.5 INJECTION, SOLUTION INFILTRATION; PERINEURAL ONCE AS NEEDED
OUTPATIENT
Start: 2024-07-15

## 2024-07-15 RX ORDER — SODIUM CHLORIDE, SODIUM LACTATE, POTASSIUM CHLORIDE, CALCIUM CHLORIDE 600; 310; 30; 20 MG/100ML; MG/100ML; MG/100ML; MG/100ML
125 INJECTION, SOLUTION INTRAVENOUS CONTINUOUS
OUTPATIENT
Start: 2024-07-15

## 2024-07-15 RX ORDER — METHYLERGONOVINE MALEATE 0.2 MG/ML
200 INJECTION INTRAVENOUS ONCE AS NEEDED
OUTPATIENT
Start: 2024-07-15

## 2024-07-15 RX ORDER — ACETAMINOPHEN 500 MG
1000 TABLET ORAL ONCE
OUTPATIENT
Start: 2024-07-15 | End: 2024-07-15

## 2024-07-15 RX ORDER — FERROUS SULFATE 325(65) MG
TABLET ORAL
COMMUNITY
Start: 2024-07-08

## 2024-07-15 RX ORDER — FAMOTIDINE 10 MG/ML
20 INJECTION, SOLUTION INTRAVENOUS ONCE
OUTPATIENT
Start: 2024-07-15 | End: 2024-07-15

## 2024-07-15 RX ORDER — ONDANSETRON 2 MG/ML
4 INJECTION INTRAMUSCULAR; INTRAVENOUS EVERY 6 HOURS PRN
OUTPATIENT
Start: 2024-07-15

## 2024-07-15 RX ORDER — FAMOTIDINE 20 MG/1
20 TABLET, FILM COATED ORAL ONCE AS NEEDED
OUTPATIENT
Start: 2024-07-15

## 2024-07-15 RX ORDER — CITRIC ACID/SODIUM CITRATE 334-500MG
30 SOLUTION, ORAL ORAL ONCE
OUTPATIENT
Start: 2024-07-15 | End: 2024-07-15

## 2024-07-15 RX ORDER — DIPHENHYDRAMINE HYDROCHLORIDE 50 MG/ML
25 INJECTION INTRAMUSCULAR; INTRAVENOUS NIGHTLY PRN
OUTPATIENT
Start: 2024-07-15

## 2024-07-15 NOTE — PROGRESS NOTES
Prenatal Care Visit    Subjective   Chief Complaint   Patient presents with    Routine Prenatal Visit     Growth scan. No concerns.     History:   Comfort is a  currently at 33w1d who presents for a prenatal care visit today.    Reports some pelvic pain but denies regular CTX, VB, LOF. Reports (+) FM.     Objective   /78   Wt 130 kg (287 lb)   LMP 08/15/2023   BMI 42.38 kg/m²   Physical Exam:  Normal, gestational age-appropriate exam today      Assessment & Plan     IUP @ 33w1d  Routine care: I have reviewed the prenatal labs and ultrasound(s) today. I have reviewed the most recent prenatal progress note(s). Growth US today - EFW 1979g (23%), AC 34%, vertex presentation, NONA 20.1 cm.  Prior x 2: G2 due to malpresentation w/ twins, G3 scheduled repeat. Plans rCS, does not plan BTL. Prep for case placed today.     Diagnosis Plan   1. Prenatal care, third trimester        2. Previous  section           Medication Management: continue PNV    Topics discussed: Prenatal care milestones  TDAP vaccination  Kick counts and fetal movement   labor signs and symptoms    Tests next visit: none   Next visit: 2 week(s)     Kenyetta Fitzgerald MD  Obstetrics and Gynecology  [facility]

## 2024-07-16 PROBLEM — Z98.891 PREVIOUS CESAREAN SECTION: Status: ACTIVE | Noted: 2024-07-15

## 2024-08-05 ENCOUNTER — ROUTINE PRENATAL (OUTPATIENT)
Dept: OBSTETRICS AND GYNECOLOGY | Facility: CLINIC | Age: 34
End: 2024-08-05
Payer: COMMERCIAL

## 2024-08-05 VITALS — DIASTOLIC BLOOD PRESSURE: 82 MMHG | SYSTOLIC BLOOD PRESSURE: 122 MMHG | WEIGHT: 288 LBS | BODY MASS INDEX: 42.53 KG/M2

## 2024-08-05 DIAGNOSIS — Z34.93 PRENATAL CARE, THIRD TRIMESTER: Primary | ICD-10-CM

## 2024-08-05 DIAGNOSIS — Z98.891 PREVIOUS CESAREAN SECTION: ICD-10-CM

## 2024-08-05 PROCEDURE — 99213 OFFICE O/P EST LOW 20 MIN: CPT | Performed by: STUDENT IN AN ORGANIZED HEALTH CARE EDUCATION/TRAINING PROGRAM

## 2024-08-05 NOTE — PROGRESS NOTES
Prenatal Care Visit    Subjective   Chief Complaint   Patient presents with    Routine Prenatal Visit     No concerns. GBS.     History:   Comfort is a  currently at 36w1d who presents for a prenatal care visit today.    Doing well. Reports some tightening of her upper abdomen. Denies regular CTX, VB, LOF. Reports (+) FM.     Objective   /82   Wt 131 kg (288 lb)   LMP 08/15/2023   BMI 42.53 kg/m²   Physical Exam:  Normal, gestational age-appropriate exam today      Assessment & Plan     IUP @ 36w1d  Routine care: I have reviewed the prenatal labs and ultrasound(s) today. I have reviewed the most recent prenatal progress note(s). GBS today.  Prior x 2: G2 due to malpresentation w/ twins, G3 scheduled repeat. Scheduled for rCS 24.     Diagnosis Plan   1. Prenatal care, third trimester  Group B Streptococcus Culture - Swab, Vaginal/Rectum      2. Previous  section           Medication Management: continue PNV    Topics discussed: Prenatal care milestones  Kick counts and fetal movement   labor signs and symptoms   Tests next visit: none   Next visit: 1 week(s)     Kenyetta Fitzgerald MD  Obstetrics and Gynecology  [facility]

## 2024-08-08 LAB — B-HEM STREP SPEC QL CULT: NEGATIVE

## 2024-08-12 ENCOUNTER — ROUTINE PRENATAL (OUTPATIENT)
Dept: OBSTETRICS AND GYNECOLOGY | Facility: CLINIC | Age: 34
End: 2024-08-12
Payer: COMMERCIAL

## 2024-08-12 VITALS — SYSTOLIC BLOOD PRESSURE: 128 MMHG | BODY MASS INDEX: 42.38 KG/M2 | DIASTOLIC BLOOD PRESSURE: 86 MMHG | WEIGHT: 287 LBS

## 2024-08-12 DIAGNOSIS — Z34.93 PRENATAL CARE IN THIRD TRIMESTER: Primary | ICD-10-CM

## 2024-08-12 DIAGNOSIS — Z98.891 HX OF CESAREAN SECTION: ICD-10-CM

## 2024-08-12 PROCEDURE — 99213 OFFICE O/P EST LOW 20 MIN: CPT | Performed by: OBSTETRICS & GYNECOLOGY

## 2024-08-12 NOTE — PROGRESS NOTES
Prenatal Care Visit    Subjective   Chief Complaint   Patient presents with    Routine Prenatal Visit     No complaints       History:   Comfort is a  currently at 37w1d who presents for a prenatal care visit today.    No major issues.    Social History    Tobacco Use      Smoking status: Every Day        Packs/day: 0.50        Years: 0.5 packs/day for 8.0 years (4.0 ttl pk-yrs)        Types: Cigarettes      Smokeless tobacco: Never       Objective   /86   Wt 130 kg (287 lb)   LMP 08/15/2023   BMI 42.38 kg/m²   Physical Exam:  Normal, gestational age-appropriate exam today        Plan   Medical Decision Making:    I have reviewed the prenatal labs and ultrasound(s) today. I have reviewed the most recent prenatal progress note(s).    Diagnosis: Supervision of low risk pregnancy  Previous C/S with planned repeat C/S  BMI 42   Tests/Orders/Rx today: No orders of the defined types were placed in this encounter.      Medication Management: none     Topics discussed: Prenatal care milestones   section risks, benefits  kick counts and fetal movement  labor signs and symptoms  PIH precautions   Tests next visit: none   Next visit: 1 week(s)     Bebo Hinojosa MD  Obstetrics and Gynecology  [facility]

## 2024-08-19 ENCOUNTER — ROUTINE PRENATAL (OUTPATIENT)
Dept: OBSTETRICS AND GYNECOLOGY | Facility: CLINIC | Age: 34
End: 2024-08-19
Payer: COMMERCIAL

## 2024-08-19 VITALS — DIASTOLIC BLOOD PRESSURE: 82 MMHG | SYSTOLIC BLOOD PRESSURE: 144 MMHG | WEIGHT: 293 LBS | BODY MASS INDEX: 43.5 KG/M2

## 2024-08-19 DIAGNOSIS — Z34.93 THIRD TRIMESTER PREGNANCY: Primary | ICD-10-CM

## 2024-08-19 PROCEDURE — 99213 OFFICE O/P EST LOW 20 MIN: CPT | Performed by: OBSTETRICS & GYNECOLOGY

## 2024-08-19 NOTE — PROGRESS NOTES
Chief Complaint   Patient presents with    Routine Prenatal Visit     Prenatal visit with no problems or concerns        HPI:   , 38w1d gestation reports doing well    ROS:  See Prenatal Episode/Flowsheet  /82   Wt 134 kg (294 lb 9.6 oz)   LMP 08/15/2023   BMI 43.50 kg/m²      EXAM:  EXTREMITIES:  No swelling-See Prenatal Episode/Flowsheet    ABDOMEN:  FHTs/Movement noted-See Prenatal Episode/Flowsheet    URINE GLUCOSE/PROTEIN:  See Prenatal Episode/Flowsheet    PELVIC EXAM:  See Prenatal Episode/Flowsheet  CV:  Lungs:  GYN:    MDM:    Lab Results   Component Value Date    HGB 11.0 (L) 2024    RUBELLAIGGIN Immune 2015    RUBELLAABIGG 3.09 2024    HEPBSAG Negative 2024    LABRPR Non-reactive 2015    ABORH O Rh Positive 10/18/2015    ABO O 2024    RH Positive 2024    ABSCRN Negative 2024    LABANTI Negative 2015    LFIDHIX80 NonReactive 2015    [id number] Non Reactive 2024    HEPCVIRUSABY Non Reactive 2024    STREPGPB Negative 2024    URINECX No growth 2019       U/S:US Ob Follow Up Transabdominal Approach (07/15/2024 15:37)     1. IUP 38w1d  2. Routine care   3. Prior C/S x 2    R C/S next week

## 2024-08-25 NOTE — H&P (VIEW-ONLY)
Anoop Main  : 1990  MRN: [id number]  CSN: [id number]    History and Physical    Subjective   Comfort Main is a 33 y.o. year old  who present for surgery due to intrauterine pregnancy at 39+ weeks, prior  x 2..    Past Medical History:   Diagnosis Date    Asthma     as a child    Depression     undiagnosed    Multiple gestation 2020    twins    Urinary tract infection      Past Surgical History:   Procedure Laterality Date     SECTION N/A 2020    Procedure:  SECTION PRIMARY;  Surgeon: Mahesh Ramon MD;  Location:  [facility];  Service: Obstetrics/Gynecology;  Laterality: N/A;     SECTION WITH TUBAL N/A 2022    CHOLECYSTECTOMY N/A 2023    Procedure: CHOLECYSTECTOMY LAPAROSCOPIC;  Surgeon: Alejandro Christine MD;  Location:  [facility];  Service: General;  Laterality: N/A;    FOOT SURGERY Right      Social History    Tobacco Use      Smoking status: Every Day        Packs/day: 0.50        Years: 0.5 packs/day for 8.0 years (4.0 ttl pk-yrs)        Types: Cigarettes      Smokeless tobacco: Never    No current facility-administered medications for this encounter.    Current Outpatient Medications:     docusate sodium (Colace) 100 MG capsule, Take 1 capsule by mouth 2 (Two) Times a Day., Disp: 60 capsule, Rfl: 6    FeroSul 325 (65 Fe) MG tablet, , Disp: , Rfl:     ferrous sulfate 325 (65 FE) MG EC tablet, Take 1 tablet by mouth Daily With Breakfast for 90 days., Disp: 30 tablet, Rfl: 2    Hydrocort-Pramoxine, Perianal, (PROCTOFOAM-HS) 1-1 % rectal foam, Insert 1 Application into the rectum 3 (Three) Times a Day., Disp: 1 each, Rfl: 12    Hydrocortisone, Perianal, (Anusol-HC) 2.5 % rectal cream, Apply rectally 2 times daily, Disp: 30 g, Rfl: 3    Prenatal Vit-Fe Fumarate-FA (Prenatal Vitamin) 27-0.8 MG tablet, Take  by mouth., Disp: , Rfl:     No Known Allergies    Review of Systems    Constitutional: Negative.    HENT: Negative.     Respiratory: Negative.     Cardiovascular: Negative.          Objective   LMP 08/15/2023   General: well developed; well nourished  no acute distress   Heart: regular rate and rhythm, S1, S2 normal, no murmur, click, rub or gallop   Lungs: breathing is unlabored  clear to auscultation bilaterally   Abdomen: soft, non-tender; no masses  no umbilical or inguinal hernias are present  no hepato-splenomegaly   Pelvis:: External genitalia:  normal appearance of the external genitalia including Bartholin's and Ranshaw's glands.   Labs  Lab Results   Component Value Date     06/28/2024    HGB 11.0 (L) 06/28/2024    HCT 34.1 06/28/2024    WBC 8.30 06/28/2024     09/10/2023    K 4.1 09/10/2023     09/10/2023    CO2 29.0 09/10/2023    BUN 8 09/10/2023    CREATININE 0.57 09/10/2023    GLUCOSE 109 (H) 09/10/2023    ALBUMIN 3.5 09/10/2023    CALCIUM 8.8 09/10/2023    AST 33 (H) 09/10/2023    ALT 49 (H) 09/10/2023    BILITOT 0.4 09/10/2023        Assessment    Intrauterine pregnancy 39+ week  Prior C/S x 2     Plan   R C/S   2. Risks, complications, benefits, and other alternatives discussed.    Johny Craven MD  8/25/2024  08:10 EDT

## 2024-08-26 ENCOUNTER — ANESTHESIA (OUTPATIENT)
Dept: PERIOP | Facility: HOSPITAL | Age: 34
End: 2024-08-26
Payer: COMMERCIAL

## 2024-08-26 ENCOUNTER — ANESTHESIA EVENT (OUTPATIENT)
Dept: PERIOP | Facility: HOSPITAL | Age: 34
End: 2024-08-26
Payer: COMMERCIAL

## 2024-08-26 ENCOUNTER — HOSPITAL ENCOUNTER (INPATIENT)
Facility: HOSPITAL | Age: 34
LOS: 2 days | Discharge: HOME | End: 2024-08-28
Attending: OBSTETRICS & GYNECOLOGY | Admitting: OBSTETRICS & GYNECOLOGY
Payer: COMMERCIAL

## 2024-08-26 DIAGNOSIS — Z98.891 S/P REPEAT LOW TRANSVERSE C-SECTION: Primary | ICD-10-CM

## 2024-08-26 DIAGNOSIS — Z98.891 PREVIOUS CESAREAN SECTION: ICD-10-CM

## 2024-08-26 LAB
ABO GROUP BLD: NORMAL
AMPHET+METHAMPHET UR QL: NEGATIVE
AMPHETAMINES UR QL: NEGATIVE
BARBITURATES UR QL SCN: NEGATIVE
BASOPHILS # BLD AUTO: 0.02 10*3/MM3 (ref 0–0.2)
BASOPHILS NFR BLD AUTO: 0.2 % (ref 0–1.5)
BENZODIAZ UR QL SCN: NEGATIVE
BILIRUB UR QL STRIP: NEGATIVE
BLD GP AB SCN SERPL QL: NEGATIVE
BUPRENORPHINE SERPL-MCNC: NEGATIVE NG/ML
CANNABINOIDS SERPL QL: NEGATIVE
CLARITY UR: CLEAR
COCAINE UR QL: NEGATIVE
COLOR UR: YELLOW
DEPRECATED RDW RBC AUTO: 45.8 FL (ref 37–54)
EOSINOPHIL # BLD AUTO: 0.1 10*3/MM3 (ref 0–0.4)
EOSINOPHIL NFR BLD AUTO: 1.2 % (ref 0.3–6.2)
ERYTHROCYTE [DISTWIDTH] IN BLOOD BY AUTOMATED COUNT: 14.2 % (ref 12.3–15.4)
FENTANYL UR-MCNC: NEGATIVE NG/ML
GLUCOSE UR STRIP-MCNC: NEGATIVE MG/DL
HCT VFR BLD AUTO: 34.7 % (ref 34–46.6)
HGB BLD-MCNC: 11.2 G/DL (ref 12–15.9)
HGB UR QL STRIP.AUTO: NEGATIVE
IMM GRANULOCYTES # BLD AUTO: 0.04 10*3/MM3 (ref 0–0.05)
IMM GRANULOCYTES NFR BLD AUTO: 0.5 % (ref 0–0.5)
KETONES UR QL STRIP: NEGATIVE
LEUKOCYTE ESTERASE UR QL STRIP.AUTO: NEGATIVE
LYMPHOCYTES # BLD AUTO: 1.81 10*3/MM3 (ref 0.7–3.1)
LYMPHOCYTES NFR BLD AUTO: 21.8 % (ref 19.6–45.3)
MCH RBC QN AUTO: 28.6 PG (ref 26.6–33)
MCHC RBC AUTO-ENTMCNC: 32.3 G/DL (ref 31.5–35.7)
MCV RBC AUTO: 88.5 FL (ref 79–97)
METHADONE UR QL SCN: NEGATIVE
MONOCYTES # BLD AUTO: 0.54 10*3/MM3 (ref 0.1–0.9)
MONOCYTES NFR BLD AUTO: 6.5 % (ref 5–12)
NEUTROPHILS NFR BLD AUTO: 5.79 10*3/MM3 (ref 1.7–7)
NEUTROPHILS NFR BLD AUTO: 69.8 % (ref 42.7–76)
NITRITE UR QL STRIP: NEGATIVE
NRBC BLD AUTO-RTO: 0 /100 WBC (ref 0–0.2)
OPIATES UR QL: NEGATIVE
OXYCODONE UR QL SCN: NEGATIVE
PCP UR QL SCN: NEGATIVE
PH UR STRIP.AUTO: 6 [PH] (ref 5–8)
PLATELET # BLD AUTO: 305 10*3/MM3 (ref 140–450)
PMV BLD AUTO: 10.5 FL (ref 6–12)
PROT UR QL STRIP: NEGATIVE
RBC # BLD AUTO: 3.92 10*6/MM3 (ref 3.77–5.28)
RH BLD: POSITIVE
SP GR UR STRIP: 1.02 (ref 1–1.03)
T&S EXPIRATION DATE: NORMAL
TREPONEMA PALLIDUM IGG+IGM AB [PRESENCE] IN SERUM OR PLASMA BY IMMUNOASSAY: NORMAL
TRICYCLICS UR QL SCN: NEGATIVE
UROBILINOGEN UR QL STRIP: NORMAL
WBC NRBC COR # BLD AUTO: 8.3 10*3/MM3 (ref 3.4–10.8)

## 2024-08-26 PROCEDURE — 51703 INSERT BLADDER CATH COMPLEX: CPT

## 2024-08-26 PROCEDURE — 86780 TREPONEMA PALLIDUM: CPT | Performed by: STUDENT IN AN ORGANIZED HEALTH CARE EDUCATION/TRAINING PROGRAM

## 2024-08-26 PROCEDURE — 86900 BLOOD TYPING SEROLOGIC ABO: CPT | Performed by: STUDENT IN AN ORGANIZED HEALTH CARE EDUCATION/TRAINING PROGRAM

## 2024-08-26 PROCEDURE — 85025 COMPLETE CBC W/AUTO DIFF WBC: CPT | Performed by: STUDENT IN AN ORGANIZED HEALTH CARE EDUCATION/TRAINING PROGRAM

## 2024-08-26 PROCEDURE — 25010000002 MORPHINE PER 10 MG: Performed by: NURSE ANESTHETIST, CERTIFIED REGISTERED

## 2024-08-26 PROCEDURE — G0463 HOSPITAL OUTPT CLINIC VISIT: HCPCS

## 2024-08-26 PROCEDURE — 59025 FETAL NON-STRESS TEST: CPT

## 2024-08-26 PROCEDURE — 25010000002 BUPIVACAINE PF 0.75 % SOLUTION: Performed by: NURSE ANESTHETIST, CERTIFIED REGISTERED

## 2024-08-26 PROCEDURE — 80307 DRUG TEST PRSMV CHEM ANLYZR: CPT | Performed by: OBSTETRICS & GYNECOLOGY

## 2024-08-26 PROCEDURE — 25010000002 ONDANSETRON PER 1 MG: Performed by: NURSE ANESTHETIST, CERTIFIED REGISTERED

## 2024-08-26 PROCEDURE — 81003 URINALYSIS AUTO W/O SCOPE: CPT | Performed by: STUDENT IN AN ORGANIZED HEALTH CARE EDUCATION/TRAINING PROGRAM

## 2024-08-26 PROCEDURE — 25010000002 CEFAZOLIN 3 G RECONSTITUTED SOLUTION 1 EACH VIAL: Performed by: STUDENT IN AN ORGANIZED HEALTH CARE EDUCATION/TRAINING PROGRAM

## 2024-08-26 PROCEDURE — 25810000003 LACTATED RINGERS PER 1000 ML: Performed by: NURSE ANESTHETIST, CERTIFIED REGISTERED

## 2024-08-26 PROCEDURE — 86850 RBC ANTIBODY SCREEN: CPT | Performed by: STUDENT IN AN ORGANIZED HEALTH CARE EDUCATION/TRAINING PROGRAM

## 2024-08-26 PROCEDURE — 88305 TISSUE EXAM BY PATHOLOGIST: CPT

## 2024-08-26 PROCEDURE — 25010000002 OXYTOCIN PER 10 UNITS: Performed by: NURSE ANESTHETIST, CERTIFIED REGISTERED

## 2024-08-26 PROCEDURE — 86901 BLOOD TYPING SEROLOGIC RH(D): CPT | Performed by: STUDENT IN AN ORGANIZED HEALTH CARE EDUCATION/TRAINING PROGRAM

## 2024-08-26 PROCEDURE — 25810000003 LACTATED RINGERS SOLUTION: Performed by: STUDENT IN AN ORGANIZED HEALTH CARE EDUCATION/TRAINING PROGRAM

## 2024-08-26 PROCEDURE — 59515 CESAREAN DELIVERY: CPT | Performed by: OBSTETRICS & GYNECOLOGY

## 2024-08-26 PROCEDURE — 25010000002 FENTANYL CITRATE (PF) 50 MCG/ML SOLUTION PREFILLED SYRINGE: Performed by: NURSE ANESTHETIST, CERTIFIED REGISTERED

## 2024-08-26 RX ORDER — CITRIC ACID/SODIUM CITRATE 334-500MG
30 SOLUTION, ORAL ORAL ONCE
Status: COMPLETED | OUTPATIENT
Start: 2024-08-26 | End: 2024-08-26

## 2024-08-26 RX ORDER — OXYTOCIN/0.9 % SODIUM CHLORIDE 30/500 ML
333 PLASTIC BAG, INJECTION (ML) INTRAVENOUS ONCE
Status: DISCONTINUED | OUTPATIENT
Start: 2024-08-26 | End: 2024-08-26 | Stop reason: HOSPADM

## 2024-08-26 RX ORDER — FAMOTIDINE 10 MG/ML
20 INJECTION, SOLUTION INTRAVENOUS ONCE
Status: COMPLETED | OUTPATIENT
Start: 2024-08-26 | End: 2024-08-26

## 2024-08-26 RX ORDER — ONDANSETRON 2 MG/ML
4 INJECTION INTRAMUSCULAR; INTRAVENOUS ONCE AS NEEDED
Status: ACTIVE | OUTPATIENT
Start: 2024-08-26 | End: 2024-08-27

## 2024-08-26 RX ORDER — NALOXONE HCL 0.4 MG/ML
0.4 VIAL (ML) INJECTION
Status: ACTIVE | OUTPATIENT
Start: 2024-08-26 | End: 2024-08-27

## 2024-08-26 RX ORDER — BUPIVACAINE HYDROCHLORIDE 7.5 MG/ML
INJECTION, SOLUTION EPIDURAL; RETROBULBAR
Status: COMPLETED | OUTPATIENT
Start: 2024-08-26 | End: 2024-08-26

## 2024-08-26 RX ORDER — ACETAMINOPHEN 325 MG/1
650 TABLET ORAL EVERY 6 HOURS
Status: DISCONTINUED | OUTPATIENT
Start: 2024-08-26 | End: 2024-08-26 | Stop reason: HOSPADM

## 2024-08-26 RX ORDER — NICOTINE 21 MG/24HR
1 PATCH, TRANSDERMAL 24 HOURS TRANSDERMAL
Status: DISCONTINUED | OUTPATIENT
Start: 2024-08-26 | End: 2024-08-28 | Stop reason: HOSPADM

## 2024-08-26 RX ORDER — MORPHINE SULFATE 2 MG/ML
2 INJECTION, SOLUTION INTRAMUSCULAR; INTRAVENOUS EVERY 4 HOURS PRN
Status: DISCONTINUED | OUTPATIENT
Start: 2024-08-26 | End: 2024-08-28 | Stop reason: HOSPADM

## 2024-08-26 RX ORDER — ONDANSETRON 2 MG/ML
4 INJECTION INTRAMUSCULAR; INTRAVENOUS EVERY 6 HOURS PRN
Status: DISCONTINUED | OUTPATIENT
Start: 2024-08-26 | End: 2024-08-28 | Stop reason: HOSPADM

## 2024-08-26 RX ORDER — SODIUM CHLORIDE 0.9 % (FLUSH) 0.9 %
10 SYRINGE (ML) INJECTION AS NEEDED
Status: DISCONTINUED | OUTPATIENT
Start: 2024-08-26 | End: 2024-08-26 | Stop reason: HOSPADM

## 2024-08-26 RX ORDER — PROCHLORPERAZINE EDISYLATE 5 MG/ML
10 INJECTION INTRAMUSCULAR; INTRAVENOUS ONCE AS NEEDED
Status: DISCONTINUED | OUTPATIENT
Start: 2024-08-26 | End: 2024-08-26 | Stop reason: HOSPADM

## 2024-08-26 RX ORDER — PHENYLEPHRINE HCL IN 0.9% NACL 1 MG/10 ML
SYRINGE (ML) INTRAVENOUS AS NEEDED
Status: DISCONTINUED | OUTPATIENT
Start: 2024-08-26 | End: 2024-08-26 | Stop reason: SURG

## 2024-08-26 RX ORDER — KETOROLAC TROMETHAMINE 30 MG/ML
30 INJECTION, SOLUTION INTRAMUSCULAR; INTRAVENOUS ONCE
Status: DISCONTINUED | OUTPATIENT
Start: 2024-08-26 | End: 2024-08-26 | Stop reason: HOSPADM

## 2024-08-26 RX ORDER — NALOXONE HCL 0.4 MG/ML
0.4 VIAL (ML) INJECTION
Status: DISCONTINUED | OUTPATIENT
Start: 2024-08-26 | End: 2024-08-28 | Stop reason: HOSPADM

## 2024-08-26 RX ORDER — FAMOTIDINE 10 MG/ML
20 INJECTION, SOLUTION INTRAVENOUS ONCE AS NEEDED
Status: DISCONTINUED | OUTPATIENT
Start: 2024-08-26 | End: 2024-08-26 | Stop reason: HOSPADM

## 2024-08-26 RX ORDER — SODIUM CHLORIDE 9 MG/ML
40 INJECTION, SOLUTION INTRAVENOUS AS NEEDED
Status: DISCONTINUED | OUTPATIENT
Start: 2024-08-26 | End: 2024-08-26 | Stop reason: HOSPADM

## 2024-08-26 RX ORDER — IBUPROFEN 800 MG/1
800 TABLET, FILM COATED ORAL EVERY 6 HOURS SCHEDULED
Status: DISCONTINUED | OUTPATIENT
Start: 2024-08-26 | End: 2024-08-28 | Stop reason: HOSPADM

## 2024-08-26 RX ORDER — PROMETHAZINE HYDROCHLORIDE 12.5 MG/1
12.5 TABLET ORAL EVERY 6 HOURS PRN
Status: DISCONTINUED | OUTPATIENT
Start: 2024-08-26 | End: 2024-08-26 | Stop reason: HOSPADM

## 2024-08-26 RX ORDER — FENTANYL CITRATE 50 UG/ML
INJECTION, SOLUTION INTRAMUSCULAR; INTRAVENOUS AS NEEDED
Status: DISCONTINUED | OUTPATIENT
Start: 2024-08-26 | End: 2024-08-26 | Stop reason: SURG

## 2024-08-26 RX ORDER — ACETAMINOPHEN 325 MG/1
650 TABLET ORAL EVERY 6 HOURS
Status: DISCONTINUED | OUTPATIENT
Start: 2024-08-27 | End: 2024-08-28 | Stop reason: HOSPADM

## 2024-08-26 RX ORDER — OXYTOCIN/0.9 % SODIUM CHLORIDE 30/500 ML
125 PLASTIC BAG, INJECTION (ML) INTRAVENOUS ONCE AS NEEDED
Status: DISCONTINUED | OUTPATIENT
Start: 2024-08-26 | End: 2024-08-28 | Stop reason: HOSPADM

## 2024-08-26 RX ORDER — ENOXAPARIN SODIUM 100 MG/ML
40 INJECTION SUBCUTANEOUS EVERY 12 HOURS
Status: DISCONTINUED | OUTPATIENT
Start: 2024-08-27 | End: 2024-08-28 | Stop reason: HOSPADM

## 2024-08-26 RX ORDER — METHYLERGONOVINE MALEATE 0.2 MG/ML
200 INJECTION INTRAVENOUS ONCE AS NEEDED
Status: DISCONTINUED | OUTPATIENT
Start: 2024-08-26 | End: 2024-08-26 | Stop reason: HOSPADM

## 2024-08-26 RX ORDER — OXYTOCIN 10 [USP'U]/ML
INJECTION, SOLUTION INTRAMUSCULAR; INTRAVENOUS AS NEEDED
Status: DISCONTINUED | OUTPATIENT
Start: 2024-08-26 | End: 2024-08-26 | Stop reason: SURG

## 2024-08-26 RX ORDER — SIMETHICONE 80 MG
80 TABLET,CHEWABLE ORAL 4 TIMES DAILY PRN
Status: DISCONTINUED | OUTPATIENT
Start: 2024-08-26 | End: 2024-08-28 | Stop reason: HOSPADM

## 2024-08-26 RX ORDER — MORPHINE SULFATE 1 MG/ML
INJECTION, SOLUTION EPIDURAL; INTRATHECAL; INTRAVENOUS AS NEEDED
Status: DISCONTINUED | OUTPATIENT
Start: 2024-08-26 | End: 2024-08-26 | Stop reason: SURG

## 2024-08-26 RX ORDER — DOCUSATE SODIUM 100 MG/1
100 CAPSULE, LIQUID FILLED ORAL 2 TIMES DAILY PRN
Status: DISCONTINUED | OUTPATIENT
Start: 2024-08-26 | End: 2024-08-28 | Stop reason: HOSPADM

## 2024-08-26 RX ORDER — SODIUM CHLORIDE, SODIUM LACTATE, POTASSIUM CHLORIDE, CALCIUM CHLORIDE 600; 310; 30; 20 MG/100ML; MG/100ML; MG/100ML; MG/100ML
INJECTION, SOLUTION INTRAVENOUS CONTINUOUS PRN
Status: DISCONTINUED | OUTPATIENT
Start: 2024-08-26 | End: 2024-08-26 | Stop reason: SURG

## 2024-08-26 RX ORDER — ONDANSETRON 2 MG/ML
4 INJECTION INTRAMUSCULAR; INTRAVENOUS ONCE AS NEEDED
Status: DISCONTINUED | OUTPATIENT
Start: 2024-08-26 | End: 2024-08-26 | Stop reason: HOSPADM

## 2024-08-26 RX ORDER — SODIUM CHLORIDE, SODIUM LACTATE, POTASSIUM CHLORIDE, CALCIUM CHLORIDE 600; 310; 30; 20 MG/100ML; MG/100ML; MG/100ML; MG/100ML
125 INJECTION, SOLUTION INTRAVENOUS CONTINUOUS
Status: DISCONTINUED | OUTPATIENT
Start: 2024-08-26 | End: 2024-08-26

## 2024-08-26 RX ORDER — CARBOPROST TROMETHAMINE 250 UG/ML
250 INJECTION, SOLUTION INTRAMUSCULAR AS NEEDED
Status: DISCONTINUED | OUTPATIENT
Start: 2024-08-26 | End: 2024-08-26 | Stop reason: HOSPADM

## 2024-08-26 RX ORDER — IBUPROFEN 600 MG/1
600 TABLET, FILM COATED ORAL EVERY 6 HOURS SCHEDULED
Status: DISCONTINUED | OUTPATIENT
Start: 2024-08-26 | End: 2024-08-26 | Stop reason: HOSPADM

## 2024-08-26 RX ORDER — ALUMINA, MAGNESIA, AND SIMETHICONE 2400; 2400; 240 MG/30ML; MG/30ML; MG/30ML
15 SUSPENSION ORAL EVERY 4 HOURS PRN
Status: DISCONTINUED | OUTPATIENT
Start: 2024-08-26 | End: 2024-08-28 | Stop reason: HOSPADM

## 2024-08-26 RX ORDER — OXYCODONE HYDROCHLORIDE 5 MG/1
10 TABLET ORAL EVERY 4 HOURS PRN
Status: DISCONTINUED | OUTPATIENT
Start: 2024-08-26 | End: 2024-08-28 | Stop reason: HOSPADM

## 2024-08-26 RX ORDER — LIDOCAINE HYDROCHLORIDE 10 MG/ML
0.5 INJECTION, SOLUTION INFILTRATION; PERINEURAL ONCE AS NEEDED
Status: DISCONTINUED | OUTPATIENT
Start: 2024-08-26 | End: 2024-08-26 | Stop reason: HOSPADM

## 2024-08-26 RX ORDER — DIPHENHYDRAMINE HCL 25 MG
25 CAPSULE ORAL NIGHTLY PRN
Status: DISCONTINUED | OUTPATIENT
Start: 2024-08-26 | End: 2024-08-26 | Stop reason: HOSPADM

## 2024-08-26 RX ORDER — ONDANSETRON 2 MG/ML
INJECTION INTRAMUSCULAR; INTRAVENOUS AS NEEDED
Status: DISCONTINUED | OUTPATIENT
Start: 2024-08-26 | End: 2024-08-26 | Stop reason: SURG

## 2024-08-26 RX ORDER — MORPHINE SULFATE 2 MG/ML
2 INJECTION, SOLUTION INTRAMUSCULAR; INTRAVENOUS
Status: DISCONTINUED | OUTPATIENT
Start: 2024-08-26 | End: 2024-08-26 | Stop reason: HOSPADM

## 2024-08-26 RX ORDER — OXYTOCIN/0.9 % SODIUM CHLORIDE 30/500 ML
42 PLASTIC BAG, INJECTION (ML) INTRAVENOUS AS NEEDED
Status: DISCONTINUED | OUTPATIENT
Start: 2024-08-26 | End: 2024-08-26 | Stop reason: HOSPADM

## 2024-08-26 RX ORDER — OXYCODONE HYDROCHLORIDE 5 MG/1
5 TABLET ORAL EVERY 4 HOURS PRN
Status: DISCONTINUED | OUTPATIENT
Start: 2024-08-26 | End: 2024-08-28 | Stop reason: HOSPADM

## 2024-08-26 RX ORDER — PRENATAL VIT/IRON FUM/FOLIC AC 27MG-0.8MG
1 TABLET ORAL DAILY
Status: DISCONTINUED | OUTPATIENT
Start: 2024-08-26 | End: 2024-08-28 | Stop reason: HOSPADM

## 2024-08-26 RX ORDER — CALCIUM CARBONATE 500 MG/1
1 TABLET, CHEWABLE ORAL EVERY 4 HOURS PRN
Status: DISCONTINUED | OUTPATIENT
Start: 2024-08-26 | End: 2024-08-28 | Stop reason: HOSPADM

## 2024-08-26 RX ORDER — SODIUM CHLORIDE 0.9 % (FLUSH) 0.9 %
10 SYRINGE (ML) INJECTION EVERY 12 HOURS SCHEDULED
Status: DISCONTINUED | OUTPATIENT
Start: 2024-08-26 | End: 2024-08-26 | Stop reason: HOSPADM

## 2024-08-26 RX ORDER — TRANEXAMIC ACID 10 MG/ML
1000 INJECTION, SOLUTION INTRAVENOUS ONCE AS NEEDED
Status: DISCONTINUED | OUTPATIENT
Start: 2024-08-26 | End: 2024-08-26 | Stop reason: HOSPADM

## 2024-08-26 RX ORDER — FAMOTIDINE 20 MG/1
20 TABLET, FILM COATED ORAL ONCE AS NEEDED
Status: DISCONTINUED | OUTPATIENT
Start: 2024-08-26 | End: 2024-08-26 | Stop reason: HOSPADM

## 2024-08-26 RX ORDER — ACETAMINOPHEN 500 MG
1000 TABLET ORAL EVERY 6 HOURS
Status: COMPLETED | OUTPATIENT
Start: 2024-08-26 | End: 2024-08-27

## 2024-08-26 RX ORDER — NALBUPHINE HYDROCHLORIDE 10 MG/ML
2.5 INJECTION, SOLUTION INTRAMUSCULAR; INTRAVENOUS; SUBCUTANEOUS ONCE AS NEEDED
Status: DISCONTINUED | OUTPATIENT
Start: 2024-08-26 | End: 2024-08-26 | Stop reason: HOSPADM

## 2024-08-26 RX ORDER — NALBUPHINE HYDROCHLORIDE 10 MG/ML
2.5 INJECTION, SOLUTION INTRAMUSCULAR; INTRAVENOUS; SUBCUTANEOUS EVERY 4 HOURS PRN
Status: ACTIVE | OUTPATIENT
Start: 2024-08-26 | End: 2024-08-27

## 2024-08-26 RX ORDER — ONDANSETRON 2 MG/ML
4 INJECTION INTRAMUSCULAR; INTRAVENOUS EVERY 6 HOURS PRN
Status: DISCONTINUED | OUTPATIENT
Start: 2024-08-26 | End: 2024-08-26 | Stop reason: HOSPADM

## 2024-08-26 RX ORDER — DIPHENHYDRAMINE HYDROCHLORIDE 50 MG/ML
25 INJECTION INTRAMUSCULAR; INTRAVENOUS NIGHTLY PRN
Status: DISCONTINUED | OUTPATIENT
Start: 2024-08-26 | End: 2024-08-26 | Stop reason: HOSPADM

## 2024-08-26 RX ORDER — DIPHENHYDRAMINE HCL 25 MG
25 CAPSULE ORAL EVERY 4 HOURS PRN
Status: DISCONTINUED | OUTPATIENT
Start: 2024-08-26 | End: 2024-08-28 | Stop reason: HOSPADM

## 2024-08-26 RX ORDER — MISOPROSTOL 200 UG/1
800 TABLET ORAL AS NEEDED
Status: DISCONTINUED | OUTPATIENT
Start: 2024-08-26 | End: 2024-08-26 | Stop reason: HOSPADM

## 2024-08-26 RX ORDER — ONDANSETRON 4 MG/1
4 TABLET, ORALLY DISINTEGRATING ORAL EVERY 6 HOURS PRN
Status: DISCONTINUED | OUTPATIENT
Start: 2024-08-26 | End: 2024-08-26 | Stop reason: HOSPADM

## 2024-08-26 RX ORDER — ACETAMINOPHEN 500 MG
1000 TABLET ORAL ONCE
Status: COMPLETED | OUTPATIENT
Start: 2024-08-26 | End: 2024-08-26

## 2024-08-26 RX ORDER — IPRATROPIUM BROMIDE AND ALBUTEROL SULFATE 2.5; .5 MG/3ML; MG/3ML
3 SOLUTION RESPIRATORY (INHALATION) ONCE AS NEEDED
Status: DISCONTINUED | OUTPATIENT
Start: 2024-08-26 | End: 2024-08-26 | Stop reason: HOSPADM

## 2024-08-26 RX ORDER — MEPERIDINE HYDROCHLORIDE 25 MG/ML
12.5 INJECTION INTRAMUSCULAR; INTRAVENOUS; SUBCUTANEOUS
Status: DISCONTINUED | OUTPATIENT
Start: 2024-08-26 | End: 2024-08-26 | Stop reason: HOSPADM

## 2024-08-26 RX ADMIN — FAMOTIDINE 20 MG: 10 INJECTION INTRAVENOUS at 08:53

## 2024-08-26 RX ADMIN — OXYCODONE HYDROCHLORIDE 10 MG: 5 TABLET ORAL at 19:42

## 2024-08-26 RX ADMIN — OXYTOCIN 20 UNITS: 10 INJECTION INTRAVENOUS at 11:51

## 2024-08-26 RX ADMIN — SODIUM CHLORIDE, POTASSIUM CHLORIDE, SODIUM LACTATE AND CALCIUM CHLORIDE: 600; 310; 30; 20 INJECTION, SOLUTION INTRAVENOUS at 11:15

## 2024-08-26 RX ADMIN — SODIUM CHLORIDE 3 G: 9 INJECTION, SOLUTION INTRAVENOUS at 11:33

## 2024-08-26 RX ADMIN — Medication 100 MCG: at 11:56

## 2024-08-26 RX ADMIN — OXYCODONE HYDROCHLORIDE 10 MG: 5 TABLET ORAL at 14:58

## 2024-08-26 RX ADMIN — Medication 100 MCG: at 12:13

## 2024-08-26 RX ADMIN — Medication 100 MCG: at 11:40

## 2024-08-26 RX ADMIN — Medication 100 MCG: at 11:43

## 2024-08-26 RX ADMIN — FENTANYL CITRATE 10 MCG: 50 INJECTION, SOLUTION INTRAMUSCULAR; INTRAVENOUS at 11:32

## 2024-08-26 RX ADMIN — MORPHINE SULFATE 2 MG: 1 INJECTION, SOLUTION EPIDURAL; INTRATHECAL; INTRAVENOUS at 11:32

## 2024-08-26 RX ADMIN — SODIUM CHLORIDE, POTASSIUM CHLORIDE, SODIUM LACTATE AND CALCIUM CHLORIDE: 600; 310; 30; 20 INJECTION, SOLUTION INTRAVENOUS at 12:11

## 2024-08-26 RX ADMIN — SODIUM CITRATE AND CITRIC ACID MONOHYDRATE 30 ML: 500; 334 SOLUTION ORAL at 08:52

## 2024-08-26 RX ADMIN — SODIUM CHLORIDE, POTASSIUM CHLORIDE, SODIUM LACTATE AND CALCIUM CHLORIDE 2000 ML: 600; 310; 30; 20 INJECTION, SOLUTION INTRAVENOUS at 08:40

## 2024-08-26 RX ADMIN — IBUPROFEN 800 MG: 800 TABLET, FILM COATED ORAL at 23:34

## 2024-08-26 RX ADMIN — ACETAMINOPHEN 1000 MG: 500 TABLET, FILM COATED ORAL at 08:52

## 2024-08-26 RX ADMIN — NICOTINE 1 PATCH: 21 PATCH TRANSDERMAL at 15:00

## 2024-08-26 RX ADMIN — BUPIVACAINE HYDROCHLORIDE 1.6 ML: 7.5 INJECTION, SOLUTION EPIDURAL; RETROBULBAR at 11:32

## 2024-08-26 RX ADMIN — OXYTOCIN 20 UNITS: 10 INJECTION INTRAVENOUS at 12:11

## 2024-08-26 RX ADMIN — ONDANSETRON 4 MG: 2 INJECTION INTRAMUSCULAR; INTRAVENOUS at 11:20

## 2024-08-26 RX ADMIN — ACETAMINOPHEN 1000 MG: 500 TABLET, FILM COATED ORAL at 20:45

## 2024-08-26 RX ADMIN — SIMETHICONE 80 MG: 80 TABLET, CHEWABLE ORAL at 14:43

## 2024-08-26 RX ADMIN — IBUPROFEN 800 MG: 800 TABLET, FILM COATED ORAL at 17:58

## 2024-08-26 RX ADMIN — ACETAMINOPHEN 1000 MG: 500 TABLET, FILM COATED ORAL at 14:43

## 2024-08-26 NOTE — ANESTHESIA POSTPROCEDURE EVALUATION
Patient: Comfort Main    Procedure Summary       Date: 24 Room / Location: [facility] OR 1 /  [facility] OR    Anesthesia Start: 1115 Anesthesia Stop: 1236    Procedure:  SECTION REPEAT (Abdomen) Diagnosis:       Previous  section      (Previous  section [Z98.891])    Surgeons: Johny Craven MD Provider: Meghann Nguyen CRNA    Anesthesia Type: spinal, ITN ASA Status: 3            Anesthesia Type: spinal, ITN    Vitals  Vitals Value Taken Time   /70 24 1247   Temp 97.5 °F (36.4 °C) 24 1247   Pulse 73 24 1247   Resp 16 24 1247   SpO2 100 % 24 1247           Post Anesthesia Care and Evaluation    Patient location during evaluation: bedside  Patient participation: complete - patient participated  Level of consciousness: awake and alert  Pain score: 0  Pain management: satisfactory to patient    Airway patency: patent  Anesthetic complications: No anesthetic complications  PONV Status: none  Cardiovascular status: acceptable and stable  Respiratory status: acceptable  Hydration status: acceptable    Comments: Vitals signs as noted in nursing documentation as per protocol.

## 2024-08-26 NOTE — PLAN OF CARE
Goal Outcome Evaluation:  Plan of Care Reviewed With: patient        Progress: improving  Outcome Evaluation: Pt had a C\S today. VSS.  Pt c/o pain but pain medicine doing OK.  Ice pack given for pain.  Bonding well with baby.  Pumping using manual pump.  11 ml colostrum obtained.  Baby nursing well.

## 2024-08-26 NOTE — ANESTHESIA PREPROCEDURE EVALUATION
Anesthesia Evaluation     Patient summary reviewed and Nursing notes reviewed   NPO Solid Status: > 8 hours  NPO Liquid Status: > 8 hours           Airway   Mallampati: II  TM distance: >3 FB  Neck ROM: full  Possible difficult intubation  Dental - normal exam     Pulmonary - normal exam   (+) a smoker Current, asthma (as a child),  Cardiovascular - normal exam        Neuro/Psych  (+) psychiatric history Depression  GI/Hepatic/Renal/Endo    (+) obesity, morbid obesity    Musculoskeletal     Abdominal    Substance History   (+) drug use ([place])     OB/GYN          Other        ROS/Med Hx Other: 11.2/34.7  Plts 305              Anesthesia Plan    ASA 3     spinal and ITN     (Risk and benefits discussed, discussed risk of nausea, vomiting, itching, low blood pressure, post-procedural back pain, PDPH, and infection. Patient reports understanding. Continue with POC    Risks and benefits discussed including risk of aspiration, recall and dental damage. All patient questions answered. Will continue with POC.)  intravenous induction     Anesthetic plan, risks, benefits, and alternatives have been provided, discussed and informed consent has been obtained with: patient.    Plan discussed with CRNA.    CODE STATUS:    Level Of Support Discussed With: Patient  Code Status (Patient has no pulse and is not breathing): CPR (Attempt to Resuscitate)  Medical Interventions (Patient has pulse or is breathing): Full Support

## 2024-08-26 NOTE — LACTATION NOTE
Lactation Consult Note    Evaluation Completed: 2024 14:51 EDT  Patient Name: Comfort Main  :  1990  MRN:  [id number]     REFERRAL  INFORMATION:                          Date of Referral: 24   Person Making Referral: lactation consultant  Maternal Reason for Referral: breastfeeding currently  Infant Reason for Referral:  ([place])    DELIVERY HISTORY:RC/S,   Infant First Feeding: breastfeeding      Skin to skin initiation date/time: 2024  12:45 PM   Skin to skin end date/time: 2024  2:15 PM        MATERNAL ASSESSMENT:  Breast Size Issue: none (24 1400)  Breast Shape: Bilateral:, round (24)  Breast Density: Bilateral:, filling, full, soft (24)  Areola: Bilateral:, elastic (24)  Nipples: Bilateral:, bulbous, long, other (see comments) (Large) (24 1400)     Left Nipple Symptoms: intact, nontender (24 1400)  Right Nipple Symptoms: intact, nontender (24 1400)     Comfort fourth time Mom second time breastfeeding. Nursed her last baby for 6 months with no problems. She has very large nipples but with a wide open mouth, baby can latch on and stay latched without pain to Mom.      INFANT ASSESSMENT:  Information for the patient's :  Cat Main [[id number]]   History reviewed. No pertinent past medical history.       Baby Boy has good rooting and opens mouth wide for good latch. Good jaw excursions with suck.       MATERNAL INFANT FEEDING:     Maternal Emotional State: receptive (24 1400)  Infant Positioning: clutch/football (24)   Signs of Milk Transfer: deep jaw excursions noted, suck/swallow ratio (24)  Pain with Feeding: no (24)     Comfort Measures Before/During Feeding: infant position adjusted, latch adjusted (24)      EQUIPMENT TYPE:  Breast Pump Type: manual pump, double electric, personal (24)  Breast Pump Flange Type: hard  (08/26/24 1400)  Breast Pump Flange Size: 28 mm (08/26/24 1400)    Breast Care: Breastfeeding: open to air (08/26/24 1400)  Breastfeeding Assistance: assisted with positioning, hand expression verified, feeding cue recognition promoted, feeding on demand promoted, infant latch-on verified, infant suck/swallow verified, support offered (08/26/24 1400)     Breastfeeding Support: diary/feeding log utilized, encouragement provided, infant-mother separation minimized, lactation counseling provided, maternal hydration promoted, maternal nutrition promoted, maternal rest encouraged (08/26/24 1400)          BREAST PUMPING:  Breast Pumping Interventions: early pumping promoted, post-feed pumping encouraged (08/26/24 1400)       LACTATION REFERRALS:  Lactation Referrals: outpatient lactation program (as needed) (08/26/24 1400)

## 2024-08-26 NOTE — PLAN OF CARE
Goal Outcome Evaluation:   Patient scheduled for repeat .

## 2024-08-26 NOTE — ANESTHESIA PROCEDURE NOTES
Spinal Block    Pre-sedation assessment completed: 8/26/2024 11:15 AM    Patient reassessed immediately prior to procedure    Patient location during procedure: OR  Indication:at surgeon's request and procedure for pain  Performed By  CRNA/CAA: Meghann Nguyen CRNA  Preanesthetic Checklist  Completed: patient identified, IV checked, site marked, risks and benefits discussed, surgical consent, monitors and equipment checked, pre-op evaluation and timeout performed  Spinal Block Prep:  Patient Position:sitting  Sterile Tech:cap, gloves, mask and sterile barriers  Prep:Chloraprep  Patient Monitoring:blood pressure monitoring, continuous pulse oximetry and EKG    Spinal Block Procedure  Approach:midline  Guidance:landmark technique and palpation technique  Location:L4-L5  Needle Type:Ck  Needle Gauge:25 G  Placement of Spinal needle event:cerebrospinal fluid aspirated  Paresthesia: no  Fluid Appearance:clear  Medications: bupivacaine PF (MARCAINE) injection 0.75% - Intrathecal   1.6 mL - 8/26/2024 11:32:00 AM   Post Assessment  Patient Tolerance:patient tolerated the procedure well with no apparent complications  Complications no  Additional Notes  Risks discussed , including but not limited to:  Headache, itching, n&v, infection, failure, decreased blood pressure, permanent/chronic back pain, nerve damage, paralysis, etc. All questions answered and informed consent obtained. Skin localized with lidocaine skin wheel. 1.6 ml 0.75% Marcaine with dextrose intrathecal    Attempt #1: +CSF mixed with small amount of heme, unable determine swirl with aspiration, opted to move up one level and SAB easily obtained.

## 2024-08-26 NOTE — PAYOR COMM NOTE
"TO:[facility]  FROM:GARY GROVER, RN PHONE [phone]  CLINICALS    Comfort Hawk (33 y.o. Female)       Date of Birth   1990    Social Security Number       Address   [address] [place], [place] [address]    Home Phone   [phone]    MRN   [id number]       Episcopalian   [place]ai    Marital Status   Single                            Admission Date   24    Admission Type   Elective    Admitting Provider   Johny Craven MD    Attending Provider   Johny Craven MD    Department, Room/Bed   [facility] OB GYN, W2       Discharge Date       Discharge Disposition       Discharge Destination                                 Attending Provider: Johny Craven MD    Allergies: No Known Allergies    Isolation: None   Infection: None   Code Status: CPR    Ht: 175.3 cm (69\")   Wt: 133 kg (294 lb)    Admission Cmt: None   Principal Problem: Previous  section [Z98.891]                   Active Insurance as of 2024       Primary Coverage       Payor Plan Insurance Group Employer/Plan Group    [employer] [place] [employer]ICA[place]        Payor Plan Address Payor Plan Phone Number Payor Plan Fax Number Effective Dates    [address] [address] [phone]  2023 - None Entered    [facility] [id number]         Subscriber Name Subscriber Birth Date Member ID       COMFORT HAWK 1990 [id number]                     Emergency Contacts        (Rel.) Home Phone Work Phone Mobile Phone    Bridget,Trenton (Significant Other) -- -- [phone]    LACHELLE GLOVER (Mother) [phone] -- --    DYLLAN ROBISON (Friend) [phone] -- --                 History & Physical        Johny rCaven MD at 24 1114          H&P reviewed. The patient was examined and there are no changes to the H&P.    Electronically signed by Johny Craven MD at 24 1117   Source Note: H&P " (View-Only)           Anoop Main  : 1990  MRN: [id number]  CSN: [id number]    History and Physical    Subjective  Comfort Main is a 33 y.o. year old  who present for surgery due to intrauterine pregnancy at 39+ weeks, prior  x 2..    Past Medical History:   Diagnosis Date    Asthma     as a child    Depression     undiagnosed    Multiple gestation 2020    twins    Urinary tract infection      Past Surgical History:   Procedure Laterality Date     SECTION N/A 2020    Procedure:  SECTION PRIMARY;  Surgeon: Mahesh Ramon MD;  Location:  [facility];  Service: Obstetrics/Gynecology;  Laterality: N/A;     SECTION WITH TUBAL N/A 2022    CHOLECYSTECTOMY N/A 2023    Procedure: CHOLECYSTECTOMY LAPAROSCOPIC;  Surgeon: Alejandro Christine MD;  Location: [facility];  Service: General;  Laterality: N/A;    FOOT SURGERY Right      Social History    Tobacco Use      Smoking status: Every Day        Packs/day: 0.50        Years: 0.5 packs/day for 8.0 years (4.0 ttl pk-yrs)        Types: Cigarettes      Smokeless tobacco: Never    No current facility-administered medications for this encounter.    Current Outpatient Medications:     docusate sodium (Colace) 100 MG capsule, Take 1 capsule by mouth 2 (Two) Times a Day., Disp: 60 capsule, Rfl: 6    FeroSul 325 (65 Fe) MG tablet, , Disp: , Rfl:     ferrous sulfate 325 (65 FE) MG EC tablet, Take 1 tablet by mouth Daily With Breakfast for 90 days., Disp: 30 tablet, Rfl: 2    Hydrocort-Pramoxine, Perianal, (PROCTOFOAM-HS) 1-1 % rectal foam, Insert 1 Application into the rectum 3 (Three) Times a Day., Disp: 1 each, Rfl: 12    Hydrocortisone, Perianal, (Anusol-HC) 2.5 % rectal cream, Apply rectally 2 times daily, Disp: 30 g, Rfl: 3    Prenatal Vit-Fe Fumarate-FA (Prenatal Vitamin) 27-0.8 MG tablet, Take  by mouth., Disp: , Rfl:     No Known Allergies    Review of  Systems   Constitutional: Negative.    HENT: Negative.     Respiratory: Negative.     Cardiovascular: Negative.          Objective  LMP 08/15/2023   General: well developed; well nourished  no acute distress   Heart: regular rate and rhythm, S1, S2 normal, no murmur, click, rub or gallop   Lungs: breathing is unlabored  clear to auscultation bilaterally   Abdomen: soft, non-tender; no masses  no umbilical or inguinal hernias are present  no hepato-splenomegaly   Pelvis:: External genitalia:  normal appearance of the external genitalia including Bartholin's and [place]'s glands.   Labs  Lab Results   Component Value Date     2024    HGB 11.0 (L) 2024    HCT 34.1 2024    WBC 8.30 2024     09/10/2023    K 4.1 09/10/2023     09/10/2023    CO2 29.0 09/10/2023    BUN 8 09/10/2023    CREATININE 0.57 09/10/2023    GLUCOSE 109 (H) 09/10/2023    ALBUMIN 3.5 09/10/2023    CALCIUM 8.8 09/10/2023    AST 33 (H) 09/10/2023    ALT 49 (H) 09/10/2023    BILITOT 0.4 09/10/2023        Assessment   Intrauterine pregnancy 39+ week  Prior C/S x 2     Plan  R C/S   2. Risks, complications, benefits, and other alternatives discussed.    Johny Craven MD  2024  08:10 EDT              Electronically signed by Johny Craven MD at 24 0812                 Johny Craven MD at 24 0810          [facility]  Comfort Main  : 1990  MRN: [id number]  CSN: [id number]    History and Physical    Subjective  Comfort Main is a 33 y.o. year old  who present for surgery due to intrauterine pregnancy at 39+ weeks, prior  x 2..    Past Medical History:   Diagnosis Date    Asthma     as a child    Depression     undiagnosed    Multiple gestation 2020    twins    Urinary tract infection      Past Surgical History:   Procedure Laterality Date     SECTION N/A 2020    Procedure:  SECTION  PRIMARY;  Surgeon: Mahesh Ramon MD;  Location: [facility] DELIVERY;  Service: Obstetrics/Gynecology;  Laterality: N/A;     SECTION WITH TUBAL N/A 2022    CHOLECYSTECTOMY N/A 2023    Procedure: CHOLECYSTECTOMY LAPAROSCOPIC;  Surgeon: Alejandro Christine MD;  Location: [facility] OR;  Service: General;  Laterality: N/A;    FOOT SURGERY Right      Social History    Tobacco Use      Smoking status: Every Day        Packs/day: 0.50        Years: 0.5 packs/day for 8.0 years (4.0 ttl pk-yrs)        Types: Cigarettes      Smokeless tobacco: Never    No current facility-administered medications for this encounter.    Current Outpatient Medications:     docusate sodium (Colace) 100 MG capsule, Take 1 capsule by mouth 2 (Two) Times a Day., Disp: 60 capsule, Rfl: 6    FeroSul 325 (65 Fe) MG tablet, , Disp: , Rfl:     ferrous sulfate 325 (65 FE) MG EC tablet, Take 1 tablet by mouth Daily With Breakfast for 90 days., Disp: 30 tablet, Rfl: 2    Hydrocort-Pramoxine, Perianal, (PROCTOFOAM-HS) 1-1 % rectal foam, Insert 1 Application into the rectum 3 (Three) Times a Day., Disp: 1 each, Rfl: 12    Hydrocortisone, Perianal, (Anusol-HC) 2.5 % rectal cream, Apply rectally 2 times daily, Disp: 30 g, Rfl: 3    Prenatal Vit-Fe Fumarate-FA (Prenatal Vitamin) 27-0.8 MG tablet, Take  by mouth., Disp: , Rfl:     No Known Allergies    Review of Systems   Constitutional: Negative.    HENT: Negative.     Respiratory: Negative.     Cardiovascular: Negative.          Objective  LMP 08/15/2023   General: well developed; well nourished  no acute distress   Heart: regular rate and rhythm, S1, S2 normal, no murmur, click, rub or gallop   Lungs: breathing is unlabored  clear to auscultation bilaterally   Abdomen: soft, non-tender; no masses  no umbilical or inguinal hernias are present  no hepato-splenomegaly   Pelvis:: External genitalia:  normal appearance of the external genitalia including Bartholin's and [place]'s glands.    Labs  Lab Results   Component Value Date     06/28/2024    HGB 11.0 (L) 06/28/2024    HCT 34.1 06/28/2024    WBC 8.30 06/28/2024     09/10/2023    K 4.1 09/10/2023     09/10/2023    CO2 29.0 09/10/2023    BUN 8 09/10/2023    CREATININE 0.57 09/10/2023    GLUCOSE 109 (H) 09/10/2023    ALBUMIN 3.5 09/10/2023    CALCIUM 8.8 09/10/2023    AST 33 (H) 09/10/2023    ALT 49 (H) 09/10/2023    BILITOT 0.4 09/10/2023        Assessment   Intrauterine pregnancy 39+ week  Prior C/S x 2     Plan  R C/S   2. Risks, complications, benefits, and other alternatives discussed.    Johny Craven MD  8/25/2024  08:10 EDT              Electronically signed by Johny Craven MD at 08/25/24 0812       Vital Signs (last day)       Date/Time Temp Temp src Pulse Resp BP Patient Position SpO2    08/26/24 1525 -- -- 69 16 111/77 Sitting 100    08/26/24 1455 -- -- 61 16 106/69 Sitting 99    08/26/24 1425 97.1 (36.2) Axillary 63 16 134/84 Sitting 100    08/26/24 1330 97.6 (36.4) Oral 63 17 102/68 -- 97    08/26/24 1315 -- -- 69 -- 112/67 -- 96    08/26/24 1300 -- -- 69 -- 106/72 -- 99    08/26/24 1250 -- -- 77 18 114/63 -- 99    08/26/24 1247 97.5 (36.4) -- 73 16 110/70 -- 100    08/26/24 1245 -- -- 76 -- 97/62 -- 98    08/26/24 1240 -- -- 77 -- 108/72 -- 99    08/26/24 1235 97.5 (36.4) Oral 76 18 110/70 -- 100    08/26/24 0901 -- -- 90 -- 107/75 -- --    08/26/24 0846 -- -- 102 -- 103/90 -- --    08/26/24 0820 -- -- 101 -- -- -- 99    08/26/24 0816 -- -- 93 -- 108/81 -- --    08/26/24 0801 -- -- 135 -- 93/53 -- --    08/26/24 0756 -- -- -- -- -- Lying --          Current Facility-Administered Medications   Medication Dose Route Frequency Provider Last Rate Last Admin    acetaminophen (TYLENOL) tablet 1,000 mg  1,000 mg Oral Q6H Johny Craven MD   1,000 mg at 08/26/24 1443    Followed by    [START ON 8/27/2024] acetaminophen (TYLENOL) tablet 650 mg  650 mg Oral Q6H Johny Craven MD         aluminum-magnesium hydroxide-simethicone (MAALOX MAX) 400-400-40 MG/5ML suspension 15 mL  15 mL Oral Q4H PRN Johny Craven MD        Or    calcium carbonate (TUMS) chewable tablet 500 mg (200 mg elemental)  1 tablet Oral Q4H PRN Johny Craven MD        diphenhydrAMINE (BENADRYL) capsule 25 mg  25 mg Oral Q4H PRN Johny Craven MD        docusate sodium (COLACE) capsule 100 mg  100 mg Oral BID PRN Johny Craven MD        [START ON 8/27/2024] Enoxaparin Sodium (LOVENOX) syringe 40 mg  40 mg Subcutaneous Q12H Johny Craven MD        ibuprofen (ADVIL,MOTRIN) tablet 800 mg  800 mg Oral Q6H Johny Craven MD        lanolin topical 1 Application  1 Application Topical Q1H PRN Johny Craven MD        Morphine sulfate (PF) injection 2 mg  2 mg Intravenous Q4H PRN Johny Craven MD        And    naloxone (NARCAN) injection 0.4 mg  0.4 mg Intravenous Q5 Min PRN Johny Craven MD        nalbuphine (NUBAIN) injection 2.5 mg  2.5 mg Intravenous Q4H PRN Meghann Nguyen CRNA        naloxone (NARCAN) injection 0.4 mg  0.4 mg Intravenous Q1H PRN Meghann Nguyen CRNA        nicotine (NICODERM CQ) 21 MG/24HR patch 1 patch  1 patch Transdermal Q24H Johny Craven MD   1 patch at 08/26/24 1500    ondansetron (ZOFRAN) injection 4 mg  4 mg Intravenous Once PRN Meghann Nguyen CRNA        ondansetron (ZOFRAN) injection 4 mg  4 mg Intravenous Q6H PRN Johny Craven MD        oxyCODONE (ROXICODONE) immediate release tablet 5 mg  5 mg Oral Q4H PRN Johny Craven MD        Or    oxyCODONE (ROXICODONE) immediate release tablet 10 mg  10 mg Oral Q4H PRN Johny Craven MD   10 mg at 08/26/24 1458    oxytocin (PITOCIN) 30 units in 0.9% sodium chloride 500 mL (premix)  125 mL/hr Intravenous Once PRN Johny Craven MD        prenatal vitamin tablet 1 tablet  1 tablet  Oral Daily Johny Craven MD        simethicone (MYLICON) chewable tablet 80 mg  80 mg Oral 4x Daily PRN Johny Craven MD   80 mg at 08/26/24 1443     Lab Results (last 24 hours)       Procedure Component Value Units Date/Time    TISSUE EXAM, [facility] ([facility]) [[id number]] Collected: 08/26/24 1218    Specimen: Tissue from Fibroid, Uterus Updated: 08/26/24 1531    CBC & Differential [[id number]]  (Abnormal) Collected: 08/26/24 0832    Specimen: Blood Updated: 08/26/24 0959    Narrative:      The following orders were created for panel order CBC & Differential.  Procedure                               Abnormality         Status                     ---------                               -----------         ------                     CBC Auto Differential[[id number]]        Abnormal            Final result                 Please view results for these tests on the individual orders.    CBC Auto Differential [[id number]]  (Abnormal) Collected: 08/26/24 0832    Specimen: Blood Updated: 08/26/24 0959     WBC 8.30 10*3/mm3      RBC 3.92 10*6/mm3      Hemoglobin 11.2 g/dL      Hematocrit 34.7 %      MCV 88.5 fL      MCH 28.6 pg      MCHC 32.3 g/dL      RDW 14.2 %      RDW-SD 45.8 fl      MPV 10.5 fL      Platelets 305 10*3/mm3      Neutrophil % 69.8 %      Lymphocyte % 21.8 %      Monocyte % 6.5 %      Eosinophil % 1.2 %      Basophil % 0.2 %      Immature Grans % 0.5 %      Neutrophils, Absolute 5.79 10*3/mm3      Lymphocytes, Absolute 1.81 10*3/mm3      Monocytes, Absolute 0.54 10*3/mm3      Eosinophils, Absolute 0.10 10*3/mm3      Basophils, Absolute 0.02 10*3/mm3      Immature Grans, Absolute 0.04 10*3/mm3      nRBC 0.0 /100 WBC     Fentanyl, Urine - Urine, Clean Catch [[id number]]  (Normal) Collected: 08/26/24 0821    Specimen: Urine, Clean Catch Updated: 08/26/24 0945     Fentanyl, Urine Negative    Narrative:      Negative Threshold:      Fentanyl 5 ng/mL     The normal value for the drug  tested is negative. This report includes final unconfirmed screening results to be used for medical treatment purposes only. Unconfirmed results must not be used for non-medical purposes such as employment or legal testing. Clinical consideration should be applied to any drug of abuse test, particularly when unconfirmed results are used.           Urine Drug Screen - Urine, Clean Catch [[id number]]  (Normal) Collected: 08/26/24 0821    Specimen: Urine, Clean Catch Updated: 08/26/24 0941     THC, Screen, Urine Negative     Phencyclidine (PCP), Urine Negative     Cocaine Screen, Urine Negative     Methamphetamine, Ur Negative     Opiate Screen Negative     Amphetamine Screen, Urine Negative     Benzodiazepine Screen, Urine Negative     Tricyclic Antidepressants Screen Negative     Methadone Screen, Urine Negative     Barbiturates Screen, Urine Negative     Oxycodone Screen, Urine Negative     Buprenorphine, Screen, Urine Negative    Narrative:      Cutoff For Drugs Screened:    Amphetamines               500 ng/ml  Barbiturates               200 ng/ml  Benzodiazepines            150 ng/ml  Cocaine                    150 ng/ml  Methadone                  200 ng/ml  Opiates                    100 ng/ml  Phencyclidine               25 ng/ml  THC                         50 ng/ml  Methamphetamine            500 ng/ml  Tricyclic Antidepressants  300 ng/ml  Oxycodone                  100 ng/ml  Buprenorphine               10 ng/ml    The normal value for all drugs tested is negative. This report includes unconfirmed screening results, with the cutoff values listed, to be used for medical treatment purposes only.  Unconfirmed results must not be used for non-medical purposes such as employment or legal testing.  Clinical consideration should be applied to any drug of abuse test, particularly when unconfirmed results are used.      Urinalysis With Culture If Indicated - Urine, Clean Catch [[id number]]  (Normal) Collected:  24    Specimen: Urine, Clean Catch Updated: 24     Color, UA Yellow     Appearance, UA Clear     pH, UA 6.0     Specific Gravity, UA 1.021     Glucose, UA Negative     Ketones, UA Negative     Bilirubin, UA Negative     Blood, UA Negative     Protein, UA Negative     Leuk Esterase, UA Negative     Nitrite, UA Negative     Urobilinogen, UA 1.0 E.U./dL    Narrative:      In absence of clinical symptoms, the presence of pyuria, bacteria, and/or nitrites on the urinalysis result does not correlate with infection.  Urine microscopic not indicated.    Treponema pallidum AB w/Reflex RPR [[id number]] Collected: 24    Specimen: Blood Updated: 24             Operative/Procedure Notes (last 24 hours)        Johny Craven MD at 24 1143           Anoop Moyer Victoriano Main  : 1990  MRN: [id number]  CSN: [id number]    Operative Report    Pre-Operative Dx:   IUP at 39w1d weeks   Previous  section - not a  candidate      Postoperative dx:    Same as above     Procedure: Repeat  (LTCS)       Surgeon: Johny Craven MD   Assistant: Staff  was responsible for performing the following activities: Retraction and their skilled assistance was necessary for the success of this case.         Anesthesia: Spinal        EBL: <950 mls.       Antibiotics: cefazolin 3 gms     Drains: Sidhu     Findings: VFI, 3VC, intact placenta, normal adnexa, small intramural fibroid 1cm       Indications: 33-year-old prior  x 2 presenting for repeat at term.  Risks benefits and alternatives discussed all questions answered.           Procedure Details:   After the appropriate time out, the patient was prepped and draped in the usual sterile fashion.  She had been placed in the dorsal supine left lateral tilt position.  A sidhu catheter had been placed in the bladder for drainage during the procedure. A pfannenstiel skin incision was made  with a knife and carried down to the fascia.  The fascia was nicked with a scalpel and the incision was extended bilaterally with curved Garces scissors. The superior aspect of the fascia was grasped with Kocher clamps x 2 and bluntly as well as sharply dissected away from the underlying rectus muscles.  A similar process was repeated inferiorly. The rectus muscles were  and the peritoneal cavity was entered sharply without complications. The bladder flap was created with Metzenbaum scissors and pickups with teeth.  The  retractor was placed and after checking for no entrapment, was retracted down.  A transverse uterine incision was made with the knife and extended bilaterally.  The infant was delivered from the vertex presentation.  The mouth and nose were bulb suctioned.  The cord was doubly clamped and cut and the infant handed to the pediatric nurse in attendance.  Cord blood was obtained.  The placenta was manually extracted from the uterus.  The uterus was then elevated from the abdomen and wiped free of remaining membranes.  The uterine incision was closed with #1 chromic in a running locking fashion with a second imbricating stitch.  A small fibroid had been removed that was within the incision site intramural.  1 cm.  This was done with Bovie electrocautery.  The uterus had been returned to the abdomen.  The lateral gutters were wiped free of remaining clots.  [place] suture 2-0 chromic placed in the left aspect to complete hemostatic process.  The site of surgical incision was inspected and noted to be hemostatic.  The  retractor was removed.  The subfascial tissue was inspected and noted to be hemostatic.  Peritoneum was closed with 3-0 Vicryl suture in a running fashion.  The fascia was closed with 0 PDS in a running non-locking fashion.  Subcutaneous tissue was inspected and noted to be hemostatic with minimal bovie electrocautery.  Kelsie's fascia was closed with 3-0 Vicryl in a  running non-locking fashion.  The skin was approximated with 4-0 Vicryl on a William needle in a running subcuticular fashion.  Glue and Steris placed.. Dressings were placed.  All instrument and sponge counts were correct at the end of the procedure. The patient tolerated the procedure well.  There were no complications.  She was taken to postoperative recovery room in stable condition.          Complications:   None      Disposition:   Mother to Mother Baby/Postpartum  in stable condition currently.   Baby to NBN  in stable condition currently.     Johny Craven MD  8/26/2024  12:25 EDT      Electronically signed by Johny Craven MD at 08/26/24 1227       Physician Progress Notes (last 24 hours)  Notes from 08/25/24 1546 through 08/26/24 1546   No notes of this type exist for this encounter.

## 2024-08-26 NOTE — OP NOTE
Anoop Main  : 1990  MRN: [id number]  CSN: [id number]    Operative Report    Pre-Operative Dx:   IUP at 39w1d weeks   Previous  section - not a  candidate      Postoperative dx:    Same as above     Procedure: Repeat  (LTCS)       Surgeon: Johny Craven MD   Assistant: Staff  was responsible for performing the following activities: Retraction and their skilled assistance was necessary for the success of this case.         Anesthesia: Spinal        EBL: <950 mls.       Antibiotics: cefazolin 3 gms     Drains: Sidhu     Findings: VFI, 3VC, intact placenta, normal adnexa, small intramural fibroid 1cm       Indications: 33-year-old prior  x 2 presenting for repeat at term.  Risks benefits and alternatives discussed all questions answered.           Procedure Details:   After the appropriate time out, the patient was prepped and draped in the usual sterile fashion.  She had been placed in the dorsal supine left lateral tilt position.  A sidhu catheter had been placed in the bladder for drainage during the procedure. A pfannenstiel skin incision was made with a knife and carried down to the fascia.  The fascia was nicked with a scalpel and the incision was extended bilaterally with curved Garces scissors. The superior aspect of the fascia was grasped with Kocher clamps x 2 and bluntly as well as sharply dissected away from the underlying rectus muscles.  A similar process was repeated inferiorly. The rectus muscles were  and the peritoneal cavity was entered sharply without complications. The bladder flap was created with Metzenbaum scissors and pickups with teeth.  The  retractor was placed and after checking for no entrapment, was retracted down.  A transverse uterine incision was made with the knife and extended bilaterally.  The infant was delivered from the vertex presentation.  The mouth and nose were bulb suctioned.  The  cord was doubly clamped and cut and the infant handed to the pediatric nurse in attendance.  Cord blood was obtained.  The placenta was manually extracted from the uterus.  The uterus was then elevated from the abdomen and wiped free of remaining membranes.  The uterine incision was closed with #1 chromic in a running locking fashion with a second imbricating stitch.  A small fibroid had been removed that was within the incision site intramural.  1 cm.  This was done with Bovie electrocautery.  The uterus had been returned to the abdomen.  The lateral gutters were wiped free of remaining clots.  [place] suture 2-0 chromic placed in the left aspect to complete hemostatic process.  The site of surgical incision was inspected and noted to be hemostatic.  The  retractor was removed.  The subfascial tissue was inspected and noted to be hemostatic.  Peritoneum was closed with 3-0 Vicryl suture in a running fashion.  The fascia was closed with 0 PDS in a running non-locking fashion.  Subcutaneous tissue was inspected and noted to be hemostatic with minimal bovie electrocautery.  Kelsie's fascia was closed with 3-0 Vicryl in a running non-locking fashion.  The skin was approximated with 4-0 Vicryl on a Wililam needle in a running subcuticular fashion.  Glue and Steris placed.. Dressings were placed.  All instrument and sponge counts were correct at the end of the procedure. The patient tolerated the procedure well.  There were no complications.  She was taken to postoperative recovery room in stable condition.          Complications:   None      Disposition:   Mother to Mother Baby/Postpartum  in stable condition currently.   Baby to NBN  in stable condition currently.     Johny Craven MD  2024  12:25 EDT

## 2024-08-26 NOTE — NON STRESS TEST
Comfort Main, a  at 39w1d with an MERCEDES of 2024, by Ultrasound, was seen at [facility] for a nonstress test.    Chief Complaint   Patient presents with    Scheduled      Denies any leaking or bleeding at this time. States she is feeling baby move good. Scheduled for a repeat        Patient Active Problem List   Diagnosis    Obesity (BMI 30-39.9)    Acute cholecystitis    Depression    Marijuana use    Tobacco use    Hypokalemia    Hx of  section    Pregnancy    Previous  section       Start Time: 0800  Stop Time: 829       Interpretation B  Nonstress Test Interpretation B: Reactive

## 2024-08-27 LAB
BASOPHILS # BLD AUTO: 0.02 10*3/MM3 (ref 0–0.2)
BASOPHILS NFR BLD AUTO: 0.2 % (ref 0–1.5)
DEPRECATED RDW RBC AUTO: 46.1 FL (ref 37–54)
EOSINOPHIL # BLD AUTO: 0.11 10*3/MM3 (ref 0–0.4)
EOSINOPHIL NFR BLD AUTO: 1.3 % (ref 0.3–6.2)
ERYTHROCYTE [DISTWIDTH] IN BLOOD BY AUTOMATED COUNT: 14.2 % (ref 12.3–15.4)
HCT VFR BLD AUTO: 31.8 % (ref 34–46.6)
HGB BLD-MCNC: 10.1 G/DL (ref 12–15.9)
IMM GRANULOCYTES # BLD AUTO: 0.03 10*3/MM3 (ref 0–0.05)
IMM GRANULOCYTES NFR BLD AUTO: 0.3 % (ref 0–0.5)
LYMPHOCYTES # BLD AUTO: 1.97 10*3/MM3 (ref 0.7–3.1)
LYMPHOCYTES NFR BLD AUTO: 22.6 % (ref 19.6–45.3)
MCH RBC QN AUTO: 28.5 PG (ref 26.6–33)
MCHC RBC AUTO-ENTMCNC: 31.8 G/DL (ref 31.5–35.7)
MCV RBC AUTO: 89.6 FL (ref 79–97)
MONOCYTES # BLD AUTO: 0.53 10*3/MM3 (ref 0.1–0.9)
MONOCYTES NFR BLD AUTO: 6.1 % (ref 5–12)
NEUTROPHILS NFR BLD AUTO: 6.07 10*3/MM3 (ref 1.7–7)
NEUTROPHILS NFR BLD AUTO: 69.5 % (ref 42.7–76)
NRBC BLD AUTO-RTO: 0 /100 WBC (ref 0–0.2)
PLATELET # BLD AUTO: 289 10*3/MM3 (ref 140–450)
PMV BLD AUTO: 10.7 FL (ref 6–12)
RBC # BLD AUTO: 3.55 10*6/MM3 (ref 3.77–5.28)
WBC NRBC COR # BLD AUTO: 8.73 10*3/MM3 (ref 3.4–10.8)

## 2024-08-27 PROCEDURE — 85025 COMPLETE CBC W/AUTO DIFF WBC: CPT | Performed by: OBSTETRICS & GYNECOLOGY

## 2024-08-27 PROCEDURE — 25010000002 ENOXAPARIN PER 10 MG: Performed by: OBSTETRICS & GYNECOLOGY

## 2024-08-27 RX ORDER — FERROUS SULFATE 324(65)MG
324 TABLET, DELAYED RELEASE (ENTERIC COATED) ORAL 2 TIMES DAILY WITH MEALS
Status: DISCONTINUED | OUTPATIENT
Start: 2024-08-27 | End: 2024-08-28 | Stop reason: HOSPADM

## 2024-08-27 RX ADMIN — ACETAMINOPHEN 1000 MG: 500 TABLET, FILM COATED ORAL at 02:26

## 2024-08-27 RX ADMIN — OXYCODONE HYDROCHLORIDE 10 MG: 5 TABLET ORAL at 20:45

## 2024-08-27 RX ADMIN — NICOTINE 1 PATCH: 21 PATCH TRANSDERMAL at 08:03

## 2024-08-27 RX ADMIN — ENOXAPARIN SODIUM 40 MG: 100 INJECTION SUBCUTANEOUS at 12:31

## 2024-08-27 RX ADMIN — OXYCODONE HYDROCHLORIDE 10 MG: 5 TABLET ORAL at 12:31

## 2024-08-27 RX ADMIN — IBUPROFEN 800 MG: 800 TABLET, FILM COATED ORAL at 05:55

## 2024-08-27 RX ADMIN — ENOXAPARIN SODIUM 40 MG: 100 INJECTION SUBCUTANEOUS at 23:50

## 2024-08-27 RX ADMIN — PRENATAL VITAMINS-IRON FUMARATE 27 MG IRON-FOLIC ACID 0.8 MG TABLET 1 TABLET: at 08:02

## 2024-08-27 RX ADMIN — IBUPROFEN 800 MG: 800 TABLET, FILM COATED ORAL at 17:03

## 2024-08-27 RX ADMIN — OXYCODONE HYDROCHLORIDE 10 MG: 5 TABLET ORAL at 08:01

## 2024-08-27 RX ADMIN — OXYCODONE HYDROCHLORIDE 5 MG: 5 TABLET ORAL at 02:25

## 2024-08-27 RX ADMIN — IBUPROFEN 800 MG: 800 TABLET, FILM COATED ORAL at 23:50

## 2024-08-27 RX ADMIN — ACETAMINOPHEN 1000 MG: 500 TABLET, FILM COATED ORAL at 08:01

## 2024-08-27 RX ADMIN — IBUPROFEN 800 MG: 800 TABLET, FILM COATED ORAL at 12:31

## 2024-08-27 RX ADMIN — OXYCODONE HYDROCHLORIDE 10 MG: 5 TABLET ORAL at 17:03

## 2024-08-27 RX ADMIN — FERROUS SULFATE TAB EC 324 MG (65 MG FE EQUIVALENT) 324 MG: 324 (65 FE) TABLET DELAYED RESPONSE at 17:06

## 2024-08-27 RX ADMIN — ACETAMINOPHEN 650 MG: 325 TABLET, FILM COATED ORAL at 20:46

## 2024-08-27 NOTE — CONSULTS
Patient: Comfort Main  Procedure(s):   SECTION REPEAT  Anesthesia type: spinal, ITN    Patient location: [facility] Floor  Last vitals:   Vitals:    24 0325   BP: 100/69   Pulse: 79   Resp: 18   Temp: 97.7 °F (36.5 °C)   SpO2:      Level of consciousness: awake, alert, and oriented    Post-anesthesia pain: adequate analgesia  Airway patency: patent  Respiratory: unassisted, spontaneous ventilation, room air  Cardiovascular: stable and blood pressure at baseline  Hydration: euvolemic    Anesthetic complications: no

## 2024-08-27 NOTE — PLAN OF CARE
Goal Outcome Evaluation:              Outcome Evaluation: VSS, bonding well with infant. Breastfeeding and hand expressing well. C/O increased pain throughout night. Pain controlled with rx meds. Gabriel barraza. Continue plan of care.

## 2024-08-27 NOTE — PLAN OF CARE
Problem: Adult Inpatient Plan of Care  Goal: Plan of Care Review  Outcome: Ongoing, Progressing  Flowsheets (Taken 8/27/2024 1740)  Outcome Evaluation: VSS, positive bonding observed with infant, breastfeeding going well and patient is pumping, pain is controlled with current pain medication regimen  Goal: Patient-Specific Goal (Individualized)  Outcome: Ongoing, Progressing  Goal: Absence of Hospital-Acquired Illness or Injury  Outcome: Ongoing, Progressing  Intervention: Identify and Manage Fall Risk  Recent Flowsheet Documentation  Taken 8/27/2024 1600 by Hetal Zimmer, RN  Safety Promotion/Fall Prevention:   safety round/check completed   room organization consistent   nonskid shoes/slippers when out of bed   fall prevention program maintained   clutter free environment maintained   assistive device/personal items within reach   activity supervised  Taken 8/27/2024 1400 by Hetla Zimmer, RN  Safety Promotion/Fall Prevention:   safety round/check completed   room organization consistent   nonskid shoes/slippers when out of bed   fall prevention program maintained   clutter free environment maintained   assistive device/personal items within reach   activity supervised  Taken 8/27/2024 0801 by Hetal Zimmer, RN  Safety Promotion/Fall Prevention:   safety round/check completed   room organization consistent   nonskid shoes/slippers when out of bed   fall prevention program maintained   clutter free environment maintained   assistive device/personal items within reach   activity supervised  Intervention: Prevent and Manage VTE (Venous Thromboembolism) Risk  Recent Flowsheet Documentation  Taken 8/27/2024 0801 by Hetal Zimmer, RN  Activity Management: up ad luke  Goal: Optimal Comfort and Wellbeing  Outcome: Ongoing, Progressing  Intervention: Monitor Pain and Promote Comfort  Recent Flowsheet Documentation  Taken 8/27/2024 1703 by Hetal Zimmer, RN  Pain Management Interventions:   see MAR   around-the-clock dosing  utilized  Taken 2024 1231 by Hetal Zimmer RN  Pain Management Interventions:   see MAR   around-the-clock dosing utilized  Intervention: Provide Person-Centered Care  Recent Flowsheet Documentation  Taken 2024 08 by Hetal Zimmer RN  Trust Relationship/Rapport:   care explained   choices provided   empathic listening provided   emotional support provided   questions answered   questions encouraged   reassurance provided   thoughts/feelings acknowledged  Goal: Readiness for Transition of Care  Outcome: Ongoing, Progressing     Problem: Breastfeeding  Goal: Effective Breastfeeding  Outcome: Ongoing, Progressing  Intervention: Promote Breast Care and Comfort  Recent Flowsheet Documentation  Taken 2024 08 by Hetal Zimmer RN  Breast Care: Breastfeeding: open to air  Intervention: Support Exclusive Breastfeeding Success  Recent Flowsheet Documentation  Taken 2024 08 by Hetal Zimmer RN  Supportive Measures:   active listening utilized   decision-making supported   goal-setting facilitated   positive reinforcement provided   problem-solving facilitated   self-reflection promoted   self-care encouraged   relaxation techniques promoted   self-responsibility promoted   verbalization of feelings encouraged     Problem: Adjustment to Role Transition (Postpartum  Delivery)  Goal: Successful Maternal Role Transition  Outcome: Ongoing, Progressing  Intervention: Support Maternal Role Transition  Recent Flowsheet Documentation  Taken 2024 08 by Hetal Zimmer RN  Supportive Measures:   active listening utilized   decision-making supported   goal-setting facilitated   positive reinforcement provided   problem-solving facilitated   self-reflection promoted   self-care encouraged   relaxation techniques promoted   self-responsibility promoted   verbalization of feelings encouraged     Problem: Bleeding (Postpartum  Delivery)  Goal: Hemostasis  Outcome: Ongoing, Progressing     Problem:  Infection (Postpartum  Delivery)  Goal: Absence of Infection Signs and Symptoms  Outcome: Ongoing, Progressing     Problem: Pain (Postpartum  Delivery)  Goal: Acceptable Pain Control  Outcome: Ongoing, Progressing  Intervention: Prevent or Manage Pain  Recent Flowsheet Documentation  Taken 2024 1703 by Hetal Zimmer, RN  Pain Management Interventions:   see MAR   around-the-clock dosing utilized  Taken 2024 1231 by Hetal Zimmer, RN  Pain Management Interventions:   see MAR   around-the-clock dosing utilized     Problem: Postoperative Nausea and Vomiting (Postpartum  Delivery)  Goal: Nausea and Vomiting Relief  Outcome: Ongoing, Progressing     Problem: Postoperative Urinary Retention (Postpartum  Delivery)  Goal: Effective Urinary Elimination  Outcome: Ongoing, Progressing   Goal Outcome Evaluation:              Outcome Evaluation: VSS, positive bonding observed with infant, breastfeeding going well and patient is pumping, pain is controlled with current pain medication regimen

## 2024-08-27 NOTE — PROGRESS NOTES
GERBER Anoop Main  : 1990  MRN: [id number]  CSN: [id number]    Post-operative Day #1  Subjective   Her pain is well controlled.  Vaginal bleeding is appropriate amount. She is voiding without difficulty. She is passing gas and has not had a bowel movement.  Upon review of her respiratory system, she has no respiratory symptoms and and denies SOB, cough, wheezing, chest pain. She is breast feeding       Objective     Min/max vitals past 24 hours:   Temp  Min: 97.1 °F (36.2 °C)  Max: 97.8 °F (36.6 °C)  BP  Min: 97/62  Max: 134/84  Pulse  Min: 61  Max: 79  Resp  Min: 16  Max: 18        General: well developed; well nourished  no acute distress   Resp: breathing is unlabored   CV: Not performed.   Abdomen: soft, non-tender; no masses  incision is covered by bandage   Pelvic: Not performed   Ext: normal appearance with no cyanosis or edema and no calf tenderness     Last 3 values   Results from last 7 days   Lab Units 24  0607 24  0832   WBC 10*3/mm3 8.73 8.30   HEMOGLOBIN g/dL 10.1* 11.2*   HEMATOCRIT % 31.8* 34.7   PLATELETS 10*3/mm3 289 305     Lab Results   Component Value Date    ABO O 2024    RH Positive 2024    RUBELLAABIGG 3.09 2024    HEPBSAG Negative 2024          Assessment   POD #1 S/P Repeat  (LTCS)  Anemia     Plan   Continue routine post-operative care  Ferrous Sulfate BID    Suzy Lebron, CASSIUS  2024  09:14 EDT

## 2024-08-28 VITALS
HEART RATE: 99 BPM | TEMPERATURE: 98.6 F | HEIGHT: 69 IN | OXYGEN SATURATION: 99 % | BODY MASS INDEX: 43.4 KG/M2 | RESPIRATION RATE: 16 BRPM | SYSTOLIC BLOOD PRESSURE: 129 MMHG | DIASTOLIC BLOOD PRESSURE: 87 MMHG | WEIGHT: 293 LBS

## 2024-08-28 LAB — REF LAB TEST METHOD: NORMAL

## 2024-08-28 PROCEDURE — 25010000002 ENOXAPARIN PER 10 MG: Performed by: OBSTETRICS & GYNECOLOGY

## 2024-08-28 PROCEDURE — 0503F POSTPARTUM CARE VISIT: CPT | Performed by: OBSTETRICS & GYNECOLOGY

## 2024-08-28 RX ORDER — OXYCODONE HYDROCHLORIDE 5 MG/1
5 TABLET ORAL EVERY 8 HOURS PRN
Qty: 15 TABLET | Refills: 0 | Status: SHIPPED | OUTPATIENT
Start: 2024-08-28

## 2024-08-28 RX ORDER — FERROUS SULFATE 325(65) MG
325 TABLET ORAL
Qty: 60 TABLET | Refills: 0 | Status: SHIPPED | OUTPATIENT
Start: 2024-08-28

## 2024-08-28 RX ORDER — IBUPROFEN 600 MG/1
600 TABLET, FILM COATED ORAL EVERY 6 HOURS PRN
Qty: 60 TABLET | Refills: 0 | Status: SHIPPED | OUTPATIENT
Start: 2024-08-28

## 2024-08-28 RX ORDER — DOCUSATE SODIUM 100 MG/1
100 CAPSULE, LIQUID FILLED ORAL 2 TIMES DAILY
Qty: 60 CAPSULE | Refills: 0 | Status: SHIPPED | OUTPATIENT
Start: 2024-08-28

## 2024-08-28 RX ADMIN — SIMETHICONE 80 MG: 80 TABLET, CHEWABLE ORAL at 12:58

## 2024-08-28 RX ADMIN — SIMETHICONE 80 MG: 80 TABLET, CHEWABLE ORAL at 08:58

## 2024-08-28 RX ADMIN — IBUPROFEN 800 MG: 800 TABLET, FILM COATED ORAL at 12:19

## 2024-08-28 RX ADMIN — OXYCODONE HYDROCHLORIDE 10 MG: 5 TABLET ORAL at 02:41

## 2024-08-28 RX ADMIN — ACETAMINOPHEN 650 MG: 325 TABLET, FILM COATED ORAL at 02:42

## 2024-08-28 RX ADMIN — OXYCODONE HYDROCHLORIDE 10 MG: 5 TABLET ORAL at 09:11

## 2024-08-28 RX ADMIN — ACETAMINOPHEN 650 MG: 325 TABLET, FILM COATED ORAL at 08:56

## 2024-08-28 RX ADMIN — PRENATAL VITAMINS-IRON FUMARATE 27 MG IRON-FOLIC ACID 0.8 MG TABLET 1 TABLET: at 08:58

## 2024-08-28 RX ADMIN — DOCUSATE SODIUM 100 MG: 100 CAPSULE, LIQUID FILLED ORAL at 08:57

## 2024-08-28 RX ADMIN — OXYCODONE HYDROCHLORIDE 10 MG: 5 TABLET ORAL at 12:58

## 2024-08-28 RX ADMIN — FERROUS SULFATE TAB EC 324 MG (65 MG FE EQUIVALENT) 324 MG: 324 (65 FE) TABLET DELAYED RESPONSE at 08:57

## 2024-08-28 RX ADMIN — IBUPROFEN 800 MG: 800 TABLET, FILM COATED ORAL at 05:15

## 2024-08-28 RX ADMIN — ENOXAPARIN SODIUM 40 MG: 100 INJECTION SUBCUTANEOUS at 12:19

## 2024-08-28 NOTE — PLAN OF CARE
Goal Outcome Evaluation:  Plan of Care Reviewed With: patient        Progress: improving  Outcome Evaluation: VSS.  Pt c/o intense cramping pain with breatwfeeding, but tolerating it ok with meds and ice packs.  Lochia WNL.  Incision with staples clean dry and intact. Telfa given and placed on her incision to keep incision dry.  Pt encouraged to change it daily with her daily shower. Bonding well with baby. Pt going home with her friend with baby.

## 2024-08-28 NOTE — DISCHARGE SUMMARY
Discharge Summary     Anoop Main  : 1990  MRN: [id number]  CSN: [id number]    Date of Admission: 2024   Date of Discharge:  2024   Delivering Physician: Johny Craven MD       Admission Diagnosis: Pregnancy [Z34.90]  Previous  section x 2   Discharge Diagnosis: Same as above plus  Pregnancy at 39w1d - delivered  Postpartum anemia       Procedures: Repeat  (LTCS)     Hospital Course  See the completed operative report for details regarding antepartum course and delivery.    Her post-operative course was unremarkable.  On POD # 2 she felt ready for discharge.  She was evaluated and it was determined she was able to be discharged to home.  She had no febrile morbidity.  She had normal bowel and bladder function and was hemodynamically stable.  Her wound was healing well without obvious signs of infection.  Discharge instructions and precautions were given.    Vitals  Min/max vitals past 24 hours:   Temp  Min: 97.5 °F (36.4 °C)  Max: 98.4 °F (36.9 °C)  BP  Min: 93/67  Max: 120/87  Pulse  Min: 78  Max: 105  Resp  Min: 16  Max: 18    Fluid balance past 24 hours:  I/O last 3 completed shifts:  In: 480 [P.O.:480]  Out: 5570 [Urine:5570]           Physical Exam  General Appearance: well developed, well nourished, not in any acute distress   Respiratory: breathing is unlabored, clear to auscultation bilaterally   CV: regular rate and rhythm, S1, S2 normal, no murmur, click, rub or gallop   Abdomen: soft, non-tender, no palpable masses; fundus firm and non-tender  incision is clean, dry, intact, and without drainage   Pelvic: not performed   Extremities: moves extremities well; normal appearance with no cyanosis or edema and no calf tenderness     Infant  male  fetus weighing 3204 g (7 lb 1 oz)   Apgars -  8 @ 1 minute /  9 @ 5 minutes.    Discharge labs  Lab Results   Component Value Date    WBC 8.73 2024    HGB 10.1 (L) 2024    HCT 31.8  (L) 08/27/2024     08/27/2024       Discharge Medications     Discharge Medications        New Medications        Instructions Start Date   ibuprofen 600 MG tablet  Commonly known as: ADVIL,MOTRIN   600 mg, Oral, Every 6 Hours PRN      oxyCODONE 5 MG immediate release tablet  Commonly known as: ROXICODONE   5 mg, Oral, Every 8 Hours PRN             Changes to Medications        Instructions Start Date   ferrous sulfate 325 (65 FE) MG tablet  What changed:   medication strength  See the new instructions.  Another medication with the same name was removed. Continue taking this medication, and follow the directions you see here.   325 mg, Oral, 2 Times Daily Before Meals             Continue These Medications        Instructions Start Date   docusate sodium 100 MG capsule  Commonly known as: Colace   100 mg, Oral, 2 Times Daily             Stop These Medications      Hydrocort-Pramoxine (Perianal) 1-1 % rectal foam  Commonly known as: PROCTOFOAM-HS     Hydrocortisone (Perianal) 2.5 % rectal cream  Commonly known as: Anusol-HC     Prenatal Vitamin 27-0.8 MG tablet              Discharge Disposition: Home   Condition on Discharge: Stable   Follow-up: 1 week with [facility].   Time spent: 20  minutes  Tatyana Cornejo M.D.  8/28/2024

## 2024-08-28 NOTE — CASE MANAGEMENT/SOCIAL WORK
Case Management/Social Work    Patient Name:  Comfort Main  YOB: 1990  MRN: [id number]  Admit Date:  8/26/2024      Consult received for positive THC    Chart review performed by this Sw.  UDS completed for mom and baby on 8/26/24 was normal.  Positive THC was from a UDS completed on 3/4/24.  Cord blood has been sent out for further evaluation.  Consult will be removed, as no further social work interventions are warranted.      Electronically signed by:  AJAY Huff  08/28/24 09:08 EDT

## 2024-08-29 NOTE — PAYOR COMM NOTE
"To:  [facility]  From: Briana Craft RN  Phone: [phone]  Fax: [phone]  NPI: [id number]  TIN: [id number]  Member ID: [id number]   MRN: [id number]    Comfort Hawk (33 y.o. Female)       Date of Birth   1990    Social Security Number       Address   [address] [place] [address]    Home Phone   [phone]    MRN   [id number]       Alevism   Jain    Marital Status   Single                            Admission Date   24    Admission Type   Elective    Admitting Provider   Johny Craven MD    Attending Provider       Department, Room/Bed   [facility], W2       Discharge Date   2024    Discharge Disposition   Home or Self Care    Discharge Destination                                 Attending Provider: (none)   Allergies: No Known Allergies    Isolation: None   Infection: None   Code Status: Prior    Ht: 175.3 cm (69\")   Wt: 133 kg (294 lb)    Admission Cmt: None   Principal Problem: Previous  section [Z98.891]                   Active Insurance as of 2024       Primary Coverage       Payor Plan Insurance Group Employer/Plan Group    [facility] MEDICAID        Payor Plan Address Payor Plan Phone Number Payor Plan Fax Number Effective Dates     [address] [address] [phone]  2023 - None Entered    [facility] [address]         Subscriber Name Subscriber Birth Date Member ID       COMFORT HAWK 1990 [id number]                     Emergency Contacts        (Rel.) Home Phone Work Phone Mobile Phone    Trenton Gill (Significant Other) -- -- [phone]    LACHELLE GLOVER (Mother) [phone] -- --    DYLLAN ROBISON (Friend) [phone] -- --                 Discharge Summary        Tatyana Cornejo MD at 24 0823          Discharge Summary     Anoop Hawk  : 1990  MRN: [id number]  CSN: [id number]    Date of Admission: 2024 "   Date of Discharge:  2024   Delivering Physician: Johny Craven MD       Admission Diagnosis: Pregnancy [Z34.90]  Previous  section x 2   Discharge Diagnosis: Same as above plus  Pregnancy at 39w1d - delivered  Postpartum anemia       Procedures: Repeat  (LTCS)     Hospital Course  See the completed operative report for details regarding antepartum course and delivery.    Her post-operative course was unremarkable.  On POD # 2 she felt ready for discharge.  She was evaluated and it was determined she was able to be discharged to home.  She had no febrile morbidity.  She had normal bowel and bladder function and was hemodynamically stable.  Her wound was healing well without obvious signs of infection.  Discharge instructions and precautions were given.    Vitals  Min/max vitals past 24 hours:   Temp  Min: 97.5 °F (36.4 °C)  Max: 98.4 °F (36.9 °C)  BP  Min: 93/67  Max: 120/87  Pulse  Min: 78  Max: 105  Resp  Min: 16  Max: 18    Fluid balance past 24 hours:  I/O last 3 completed shifts:  In: 480 [P.O.:480]  Out: 5570 [Urine:5570]           Physical Exam  General Appearance: well developed, well nourished, not in any acute distress   Respiratory: breathing is unlabored, clear to auscultation bilaterally   CV: regular rate and rhythm, S1, S2 normal, no murmur, click, rub or gallop   Abdomen: soft, non-tender, no palpable masses; fundus firm and non-tender  incision is clean, dry, intact, and without drainage   Pelvic: not performed   Extremities: moves extremities well; normal appearance with no cyanosis or edema and no calf tenderness     Infant  male  fetus weighing 3204 g (7 lb 1 oz)   Apgars -  8 @ 1 minute /  9 @ 5 minutes.    Discharge labs  Lab Results   Component Value Date    WBC 8.73 2024    HGB 10.1 (L) 2024    HCT 31.8 (L) 2024     2024       Discharge Medications     Discharge Medications        New Medications        Instructions Start Date    ibuprofen 600 MG tablet  Commonly known as: ADVIL,MOTRIN   600 mg, Oral, Every 6 Hours PRN      oxyCODONE 5 MG immediate release tablet  Commonly known as: ROXICODONE   5 mg, Oral, Every 8 Hours PRN             Changes to Medications        Instructions Start Date   ferrous sulfate 325 (65 FE) MG tablet  What changed:   medication strength  See the new instructions.  Another medication with the same name was removed. Continue taking this medication, and follow the directions you see here.   325 mg, Oral, 2 Times Daily Before Meals             Continue These Medications        Instructions Start Date   docusate sodium 100 MG capsule  Commonly known as: Colace   100 mg, Oral, 2 Times Daily             Stop These Medications      Hydrocort-Pramoxine (Perianal) 1-1 % rectal foam  Commonly known as: PROCTOFOAM-HS     Hydrocortisone (Perianal) 2.5 % rectal cream  Commonly known as: Anusol-HC     Prenatal Vitamin 27-0.8 MG tablet              Discharge Disposition: Home   Condition on Discharge: Stable   Follow-up: 1 week with [facility].   Time spent: 20  minutes  Tatyana Cornejo M.D.  8/28/2024     Electronically signed by Tatyana Cornejo MD at 08/28/24 5933

## 2024-09-05 ENCOUNTER — OFFICE VISIT (OUTPATIENT)
Dept: OBSTETRICS AND GYNECOLOGY | Facility: CLINIC | Age: 34
End: 2024-09-05
Payer: COMMERCIAL

## 2024-09-05 VITALS
DIASTOLIC BLOOD PRESSURE: 68 MMHG | WEIGHT: 287 LBS | BODY MASS INDEX: 42.51 KG/M2 | HEIGHT: 69 IN | SYSTOLIC BLOOD PRESSURE: 130 MMHG

## 2024-09-05 DIAGNOSIS — Z98.891 STATUS POST CESAREAN SECTION ROUTINE POSTPARTUM FOLLOW-UP: Primary | ICD-10-CM

## 2024-09-05 NOTE — PROGRESS NOTES
Subjective   Chief Complaint   Patient presents with    Post-op     10 post-op repeat C/S, C/O headache x 3 months       Comfort Main is a 33 y.o. year old  presenting to be seen for post op check.  Reports doing well postpartum. Normal bowel and bladder function  Lochia light   Breastfeeding  Has had some mild headaches off and on for several days. Relieved with ibuprofen. No visual changes  No increase in LE edema  Not getting much sleep and tired-does have help at home with baby and other children but is not resting much and wants to take care of baby        Past Medical History:   Diagnosis Date    Asthma     as a child    Depression     undiagnosed    Multiple gestation 2020    twins    Urinary tract infection         Current Outpatient Medications:     docusate sodium (Colace) 100 MG capsule, Take 1 capsule by mouth 2 (Two) Times a Day., Disp: 60 capsule, Rfl: 0    ferrous sulfate 325 (65 FE) MG tablet, Take 1 tablet by mouth 2 (Two) Times a Day Before Meals., Disp: 60 tablet, Rfl: 0    ibuprofen (ADVIL,MOTRIN) 600 MG tablet, Take 1 tablet by mouth Every 6 (Six) Hours As Needed for Mild Pain., Disp: 60 tablet, Rfl: 0    oxyCODONE (ROXICODONE) 5 MG immediate release tablet, Take 1 tablet by mouth Every 8 (Eight) Hours As Needed for Moderate Pain or Severe Pain., Disp: 15 tablet, Rfl: 0   No Known Allergies   Past Surgical History:   Procedure Laterality Date     SECTION N/A 2020    Procedure:  SECTION PRIMARY;  Surgeon: Mahesh Ramon MD;  Location:  [facility];  Service: Obstetrics/Gynecology;  Laterality: N/A;     SECTION N/A 2024    Procedure:  SECTION REPEAT;  Surgeon: Johny Craven MD;  Location: [facility] OR;  Service: Obstetrics/Gynecology;  Laterality: N/A;     SECTION WITH TUBAL N/A 2022    CHOLECYSTECTOMY N/A 2023    Procedure: CHOLECYSTECTOMY LAPAROSCOPIC;  Surgeon: Alejandro Christine MD;   "Location: [facility] OR;  Service: General;  Laterality: N/A;    FOOT SURGERY Right 2015      Social History     Socioeconomic History    Marital status: Single    Number of children: 4    Highest education level: Associate degree: academic program   Tobacco Use    Smoking status: Every Day     Current packs/day: 0.50     Average packs/day: 0.5 packs/day for 8.0 years (4.0 ttl pk-yrs)     Types: Cigarettes    Smokeless tobacco: Never   Vaping Use    Vaping status: Never Used   Substance and Sexual Activity    Alcohol use: Not Currently     Comment: SOC    Drug use: Yes     Frequency: 7.0 times per week     Types: Marijuana    Sexual activity: Yes     Partners: Female, Male      Family History   Problem Relation Age of Onset    Hypertension Maternal Grandmother     Diabetes Maternal Grandmother     Hypertension Mother        Review of Systems   Constitutional:  Positive for fatigue. Negative for chills and fever.   Gastrointestinal:  Negative for constipation, diarrhea, nausea and vomiting.   Genitourinary:  Negative for difficulty urinating and dysuria.           Objective   /68   Ht 175.3 cm (69\")   Wt 130 kg (287 lb)   LMP 08/15/2023   Breastfeeding Yes   BMI 42.38 kg/m²     Physical Exam  Constitutional:       Appearance: Normal appearance. She is well-developed and well-groomed.   Eyes:      General: Lids are normal.      Extraocular Movements: Extraocular movements intact.      Conjunctiva/sclera: Conjunctivae normal.   Abdominal:      Palpations: Abdomen is soft.      Tenderness: There is no abdominal tenderness.      Comments: Incision healing well   Neurological:      Mental Status: She is alert.   Psychiatric:         Attention and Perception: Attention normal.         Mood and Affect: Mood normal.         Speech: Speech normal.         Behavior: Behavior is cooperative.            Result Review :                   Assessment and Plan  Diagnoses and all orders for this visit:    1. Status post "  section routine postpartum follow-up (Primary)      Patient Instructions   Encouraged adequate rest and let her mother attend baby while sleeping  Adequate hydration  If any increase in headaches, visual changes, increase in edema RTO  Otherwise RTO 5 weeks            This note was electronically signed.    Debra Bahena PA-C   2024

## 2024-09-05 NOTE — PATIENT INSTRUCTIONS
Encouraged adequate rest and let her mother attend baby while sleeping  Adequate hydration  If any increase in headaches, visual changes, increase in edema RTO  Otherwise RTO 5 weeks

## 2025-07-25 RX ORDER — FERROUS SULFATE 325(65) MG
1 TABLET ORAL
Qty: 30 TABLET | Refills: 1 | Status: SHIPPED | OUTPATIENT
Start: 2025-07-25

## 2025-08-11 ENCOUNTER — OFFICE VISIT (OUTPATIENT)
Dept: FAMILY MEDICINE CLINIC | Facility: CLINIC | Age: 35
End: 2025-08-11
Payer: COMMERCIAL

## 2025-08-11 VITALS
DIASTOLIC BLOOD PRESSURE: 84 MMHG | HEART RATE: 86 BPM | RESPIRATION RATE: 18 BRPM | SYSTOLIC BLOOD PRESSURE: 126 MMHG | BODY MASS INDEX: 41.56 KG/M2 | HEIGHT: 69 IN | WEIGHT: 280.6 LBS | OXYGEN SATURATION: 98 %

## 2025-08-11 DIAGNOSIS — Z86.39 HISTORY OF IRON DEFICIENCY: ICD-10-CM

## 2025-08-11 DIAGNOSIS — L98.9 CHANGING SKIN LESION: ICD-10-CM

## 2025-08-11 DIAGNOSIS — R53.83 OTHER FATIGUE: Primary | ICD-10-CM

## 2025-08-11 DIAGNOSIS — G89.29 CHRONIC BILATERAL LOW BACK PAIN WITHOUT SCIATICA: ICD-10-CM

## 2025-08-11 DIAGNOSIS — M54.50 CHRONIC BILATERAL LOW BACK PAIN WITHOUT SCIATICA: ICD-10-CM

## 2025-08-11 PROBLEM — K81.0 ACUTE CHOLECYSTITIS: Status: RESOLVED | Noted: 2023-09-08 | Resolved: 2025-08-11

## 2025-08-11 PROBLEM — F32.A DEPRESSION: Status: RESOLVED | Noted: 2023-09-08 | Resolved: 2025-08-11

## 2025-08-11 PROBLEM — Z98.891 HX OF CESAREAN SECTION: Status: RESOLVED | Noted: 2024-03-04 | Resolved: 2025-08-11

## 2025-08-11 PROBLEM — F12.90 MARIJUANA USE: Status: RESOLVED | Noted: 2023-09-08 | Resolved: 2025-08-11

## 2025-08-11 PROCEDURE — 1159F MED LIST DOCD IN RCRD: CPT | Performed by: NURSE PRACTITIONER

## 2025-08-11 PROCEDURE — 1160F RVW MEDS BY RX/DR IN RCRD: CPT | Performed by: NURSE PRACTITIONER

## 2025-08-11 PROCEDURE — 99214 OFFICE O/P EST MOD 30 MIN: CPT | Performed by: NURSE PRACTITIONER

## 2025-08-12 LAB
25(OH)D3+25(OH)D2 SERPL-MCNC: 17.6 NG/ML (ref 30–100)
ALBUMIN SERPL-MCNC: 4.1 G/DL (ref 3.5–5.2)
ALBUMIN/GLOB SERPL: 1.8 G/DL
ALP SERPL-CCNC: 100 U/L (ref 39–117)
ALT SERPL-CCNC: 10 U/L (ref 1–33)
AST SERPL-CCNC: 14 U/L (ref 1–32)
BASOPHILS # BLD AUTO: 0.02 10*3/MM3 (ref 0–0.2)
BASOPHILS NFR BLD AUTO: 0.4 % (ref 0–1.5)
BILIRUB SERPL-MCNC: <0.2 MG/DL (ref 0–1.2)
BUN SERPL-MCNC: 8 MG/DL (ref 6–20)
BUN/CREAT SERPL: 11.3 (ref 7–25)
CALCIUM SERPL-MCNC: 9.2 MG/DL (ref 8.6–10.5)
CHLORIDE SERPL-SCNC: 107 MMOL/L (ref 98–107)
CO2 SERPL-SCNC: 24.9 MMOL/L (ref 22–29)
CREAT SERPL-MCNC: 0.71 MG/DL (ref 0.57–1)
EGFRCR SERPLBLD CKD-EPI 2021: 116 ML/MIN/1.73
EOSINOPHIL # BLD AUTO: 0.16 10*3/MM3 (ref 0–0.4)
EOSINOPHIL NFR BLD AUTO: 2.9 % (ref 0.3–6.2)
ERYTHROCYTE [DISTWIDTH] IN BLOOD BY AUTOMATED COUNT: 15.4 % (ref 12.3–15.4)
FERRITIN SERPL-MCNC: 10.9 NG/ML (ref 13–150)
FOLATE SERPL-MCNC: 3.39 NG/ML (ref 4.78–24.2)
GLOBULIN SER CALC-MCNC: 2.3 GM/DL
GLUCOSE SERPL-MCNC: 97 MG/DL (ref 65–99)
HCT VFR BLD AUTO: 39.7 % (ref 34–46.6)
HGB BLD-MCNC: 12.7 G/DL (ref 12–15.9)
IMM GRANULOCYTES # BLD AUTO: 0.02 10*3/MM3 (ref 0–0.05)
IMM GRANULOCYTES NFR BLD AUTO: 0.4 % (ref 0–0.5)
IRON SATN MFR SERPL: 12 % (ref 20–50)
IRON SERPL-MCNC: 53 MCG/DL (ref 37–145)
LYMPHOCYTES # BLD AUTO: 1.37 10*3/MM3 (ref 0.7–3.1)
LYMPHOCYTES NFR BLD AUTO: 25.2 % (ref 19.6–45.3)
MCH RBC QN AUTO: 27.9 PG (ref 26.6–33)
MCHC RBC AUTO-ENTMCNC: 32 G/DL (ref 31.5–35.7)
MCV RBC AUTO: 87.1 FL (ref 79–97)
MONOCYTES # BLD AUTO: 0.41 10*3/MM3 (ref 0.1–0.9)
MONOCYTES NFR BLD AUTO: 7.5 % (ref 5–12)
NEUTROPHILS # BLD AUTO: 3.46 10*3/MM3 (ref 1.7–7)
NEUTROPHILS NFR BLD AUTO: 63.6 % (ref 42.7–76)
NRBC BLD AUTO-RTO: 0 /100 WBC (ref 0–0.2)
PLATELET # BLD AUTO: 334 10*3/MM3 (ref 140–450)
POTASSIUM SERPL-SCNC: 4.1 MMOL/L (ref 3.5–5.2)
PROT SERPL-MCNC: 6.4 G/DL (ref 6–8.5)
RBC # BLD AUTO: 4.56 10*6/MM3 (ref 3.77–5.28)
SODIUM SERPL-SCNC: 142 MMOL/L (ref 136–145)
TIBC SERPL-MCNC: 438 MCG/DL
TSH SERPL DL<=0.005 MIU/L-ACNC: 1.33 UIU/ML (ref 0.27–4.2)
UIBC SERPL-MCNC: 385 MCG/DL (ref 112–346)
VIT B12 SERPL-MCNC: 321 PG/ML (ref 211–946)
WBC # BLD AUTO: 5.44 10*3/MM3 (ref 3.4–10.8)

## (undated) DEVICE — GLV SURG BIOGEL M LTX PF 8

## (undated) DEVICE — SOL IRR NACL 0.9PCT BT 1000ML

## (undated) DEVICE — LARGE, DISPOSABLE ALEXIS O C-SECTION PROTECTOR - RETRACTOR: Brand: ALEXIS ® O C-SECTION PROTECTOR - RETRACTOR

## (undated) DEVICE — SUT GUT CHRM 2/0 SH 27IN G123H

## (undated) DEVICE — ENDOPATH XCEL BLADELESS TROCARS WITH STABILITY SLEEVES: Brand: ENDOPATH XCEL

## (undated) DEVICE — GLV SURG SENSICARE PI MIC PF SZ8.5 LF STRL

## (undated) DEVICE — PENCL SMOKE/EVAC MEGADYNE TELESCP 10FT

## (undated) DEVICE — SUT PDS 0 CT 36IN DYED Z358T

## (undated) DEVICE — SLV SCD CALF HEMOFORCE DVT THERP REPROC MD

## (undated) DEVICE — SOL IRR H2O BTL 1000ML STRL

## (undated) DEVICE — STPLR SKIN SUBCUTICULAR INSORB 2030

## (undated) DEVICE — GOWN,PREVENTION PLUS,XXLARGE,STERILE: Brand: MEDLINE

## (undated) DEVICE — STERILE BABY BLANKET W/CSR: Brand: MEDLINE

## (undated) DEVICE — SUT GUT CHRM 1 CTX 36IN 905H

## (undated) DEVICE — PROXIMATE SKIN STAPLERS (35 WIDE) CONTAINS 35 STAINLESS STEEL STAPLES (FIXED HEAD): Brand: PROXIMATE

## (undated) DEVICE — GLV SURG BIOGEL LTX PF 7 1/2

## (undated) DEVICE — MAT PREVALON MOBL TRANSFR AIR WO/PAD 39X80IN

## (undated) DEVICE — LAPAROVUE VISIBILITY SYSTEM LAPAROSCOPIC SOLUTIONS: Brand: LAPAROVUE

## (undated) DEVICE — CUP EXTRCT VAC

## (undated) DEVICE — ENDOPOUCH RETRIEVER SPECIMEN RETRIEVAL BAGS: Brand: ENDOPOUCH RETRIEVER

## (undated) DEVICE — GLV SURG SENSICARE PI LF PF 8 GRN STRL

## (undated) DEVICE — SUT VIC 4/0 KS 27IN VCP662H

## (undated) DEVICE — RICH C-SECTION: Brand: MEDLINE INDUSTRIES, INC.

## (undated) DEVICE — SYR LUERLOK 30CC

## (undated) DEVICE — GLV SURG SENSICARE PI MIC PF SZ8 LF STRL

## (undated) DEVICE — TOWEL,OR,DSP,ST,NATURAL,DLX,4/PK,20PK/CS: Brand: MEDLINE

## (undated) DEVICE — PK LAP LASR CHOLE 10

## (undated) DEVICE — PDS II VLT 0 107CM AG ST3: Brand: ENDOLOOP

## (undated) DEVICE — ENDOCUT SCISSOR TIP, DISPOSABLE: Brand: RENEW

## (undated) DEVICE — SUT MNCRYL PLS ANTIB UD 4/0 PS2 18IN

## (undated) DEVICE — SUT VICRYL 3/0 CT1 27IN J258H

## (undated) DEVICE — ADHS SKIN PREMIERPRO EXOFIN TOPICAL HI/VISC .5ML

## (undated) DEVICE — LAPAROSCOPIC SCISSORS: Brand: EPIX LAPAROSCOPIC SCISSORS

## (undated) DEVICE — PENCL ES MEGADINE EZ/CLEAN BUTN W/HOLSTR 10FT

## (undated) DEVICE — PATIENT RETURN ELECTRODE, SINGLE-USE, CONTACT QUALITY MONITORING, ADULT, WITH 9FT CORD, FOR PATIENTS WEIGING OVER 33LBS. (15KG): Brand: MEGADYNE

## (undated) DEVICE — APPL CHLORAPREP W/TINT 26ML ORNG

## (undated) DEVICE — TRY SPINE BLCK WHITACRE 25G 3X5IN

## (undated) DEVICE — SUT VIC 0 UR6 27IN VCP603H

## (undated) DEVICE — STRIP,CLOSURE,WOUND,MEDI-STRIP,1/2X4: Brand: MEDLINE

## (undated) DEVICE — GLV SURG BIOGEL PI ULTRATOUCH G SZ7.5 LF

## (undated) DEVICE — PK C/SECT 10

## (undated) DEVICE — [HIGH FLOW INSUFFLATOR,  DO NOT USE IF PACKAGE IS DAMAGED,  KEEP DRY,  KEEP AWAY FROM SUNLIGHT,  PROTECT FROM HEAT AND RADIOACTIVE SOURCES.]: Brand: PNEUMOSURE

## (undated) DEVICE — LAPAROSCOPIC SMOKE FILTRATION SYSTEM: Brand: PALL LAPAROSHIELD® PLUS LAPAROSCOPIC SMOKE FILTRATION SYSTEM

## (undated) DEVICE — GOWN AURORA POLY REINF XL: Brand: MEDLINE

## (undated) DEVICE — ENDOPATH XCEL UNIVERSAL TROCAR STABLILITY SLEEVES: Brand: ENDOPATH XCEL

## (undated) DEVICE — COATED VICRYL  (POLYGLACTIN 910) SUTURE, VIOLET BRAIDED, STERILE, SYNTHETIC ABSORBABLE SUTURE: Brand: COATED VICRYL

## (undated) DEVICE — SYS CLS SKIN PREMIERPRO EXOFINFUSION 22CM

## (undated) DEVICE — INTERDRY. MOISTURE WICKING FABRIC WITH ANTIMICROBIAL SILVER. 144 IN BY 10 IN: Brand: INTERDRY

## (undated) DEVICE — SYS SKIN CLS DERMABOND PRINEO W/22CM MESH TP